# Patient Record
Sex: FEMALE | Race: WHITE | NOT HISPANIC OR LATINO
[De-identification: names, ages, dates, MRNs, and addresses within clinical notes are randomized per-mention and may not be internally consistent; named-entity substitution may affect disease eponyms.]

---

## 2017-11-01 ENCOUNTER — LABORATORY RESULT (OUTPATIENT)
Age: 22
End: 2017-11-01

## 2017-11-01 ENCOUNTER — APPOINTMENT (OUTPATIENT)
Dept: OBGYN | Facility: CLINIC | Age: 22
End: 2017-11-01
Payer: COMMERCIAL

## 2017-11-01 VITALS
HEIGHT: 71 IN | BODY MASS INDEX: 35.42 KG/M2 | DIASTOLIC BLOOD PRESSURE: 78 MMHG | SYSTOLIC BLOOD PRESSURE: 112 MMHG | WEIGHT: 253 LBS

## 2017-11-01 PROCEDURE — 99395 PREV VISIT EST AGE 18-39: CPT

## 2017-11-03 ENCOUNTER — EMERGENCY (EMERGENCY)
Facility: HOSPITAL | Age: 22
LOS: 1 days | Discharge: HOME | End: 2017-11-03
Attending: EMERGENCY MEDICINE | Admitting: EMERGENCY MEDICINE
Payer: COMMERCIAL

## 2017-11-03 VITALS
DIASTOLIC BLOOD PRESSURE: 89 MMHG | TEMPERATURE: 99 F | RESPIRATION RATE: 19 BRPM | SYSTOLIC BLOOD PRESSURE: 142 MMHG | HEART RATE: 66 BPM | OXYGEN SATURATION: 98 %

## 2017-11-03 VITALS
DIASTOLIC BLOOD PRESSURE: 74 MMHG | HEART RATE: 72 BPM | TEMPERATURE: 98 F | OXYGEN SATURATION: 98 % | SYSTOLIC BLOOD PRESSURE: 138 MMHG | RESPIRATION RATE: 18 BRPM

## 2017-11-03 LAB
APPEARANCE UR: CLEAR — SIGNIFICANT CHANGE UP
BACTERIA # UR AUTO: ABNORMAL /HPF
BILIRUB UR-MCNC: NEGATIVE — SIGNIFICANT CHANGE UP
COLOR SPEC: YELLOW — SIGNIFICANT CHANGE UP
DIFF PNL FLD: NEGATIVE — SIGNIFICANT CHANGE UP
EPI CELLS # UR: SIGNIFICANT CHANGE UP /HPF
GLUCOSE UR QL: NEGATIVE — SIGNIFICANT CHANGE UP
KETONES UR-MCNC: NEGATIVE — SIGNIFICANT CHANGE UP
LEUKOCYTE ESTERASE UR-ACNC: ABNORMAL
NITRITE UR-MCNC: NEGATIVE — SIGNIFICANT CHANGE UP
PH UR: 5.5 — SIGNIFICANT CHANGE UP (ref 5–8)
PROT UR-MCNC: NEGATIVE — SIGNIFICANT CHANGE UP
RBC CASTS # UR COMP ASSIST: SIGNIFICANT CHANGE UP /HPF (ref 0–2)
SP GR SPEC: 1.01 — SIGNIFICANT CHANGE UP (ref 1.01–1.02)
UROBILINOGEN FLD QL: NEGATIVE — SIGNIFICANT CHANGE UP
WBC UR QL: SIGNIFICANT CHANGE UP /HPF (ref 0–5)

## 2017-11-03 PROCEDURE — 81001 URINALYSIS AUTO W/SCOPE: CPT

## 2017-11-03 PROCEDURE — 93005 ELECTROCARDIOGRAM TRACING: CPT

## 2017-11-03 PROCEDURE — 93010 ELECTROCARDIOGRAM REPORT: CPT

## 2017-11-03 PROCEDURE — 99283 EMERGENCY DEPT VISIT LOW MDM: CPT | Mod: 25

## 2017-11-03 PROCEDURE — 99284 EMERGENCY DEPT VISIT MOD MDM: CPT | Mod: 25

## 2017-11-03 PROCEDURE — 82962 GLUCOSE BLOOD TEST: CPT

## 2017-11-03 NOTE — ED PROVIDER NOTE - OBJECTIVE STATEMENT
22F no sig pmh p/w dizziness. Patient has had cold like symptoms for a week that are now resolving. 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 Symptoms are reproducible with rapid head movements. Went to nurse and had +orthostatics -->130 and was told to go into the ED. Patient endorses feeling clammy, but otherwise well. No nausea/vomiting, cp, sob, abd pain, diarrhea, palpitations, fevers, chills, no other complaints. States that she has had decreased PO intake recently. Of note, last menstrual period was 1 week ago. 22F no sig pmh p/w dizziness. Patient has had cold like symptoms for a week that are now resolving. 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 Symptoms are reproducible with rapid head movements. Went to nurse and had +orthostatics -->130 and was told to go into the ED. Patient endorses feeling clammy, but otherwise well. No nausea/vomiting, cp, sob, abd pain, diarrhea, palpitations, fevers, chills, no other complaints. States that she has had decreased PO intake recently. Of note, last menstrual period was 1 week ago.

## 2017-11-03 NOTE — ED PROVIDER NOTE - CARE PLAN
Principal Discharge DX:	Lightheadedness  Instructions for follow-up, activity and diet:	Drink plenty of fluids. Rest often. Follow-up with your doctor for a recheck.

## 2017-11-03 NOTE — ED ADULT NURSE NOTE - OBJECTIVE STATEMENT
pt has a "head cold" for 2 weeks.  she feels dizzy at times and was at work and her BP checked and the diastolc was over 100.  she came here for that and for her head cold.  neuro is wdl

## 2017-11-03 NOTE — ED PROVIDER NOTE - ATTENDING CONTRIBUTION TO CARE
Beverley Cottrell MD - Attending Physician: I have personally seen and examined this patient with the resident.  I have fully participated in the care of this patient. I have reviewed all pertinent clinical information, including history, physical exam, plan and the Resident’s note and agree except as noted. See MDM

## 2017-11-03 NOTE — ED PROVIDER NOTE - NS ED ROS FT
CONST: no fevers, no chills  EYES: no pain  ENT: no sore throat   CV: no chest pain  RESP: no shortness of breath  ABD: no abdominal pain   : no dysuria  MSK: no back pain  NEURO: lightheaded, no headache or additional neurologic complaints  HEME: no easy bleeding  SKIN:  no rash

## 2017-11-03 NOTE — ED PROVIDER NOTE - MEDICAL DECISION MAKING DETAILS
22F no sig pmh p/w lightheadedness. Likely 2/2 volume depletion or anemia 2/2 menstrual periods. Will encourage PO intake. Likely d/c home with return precautions 22F no sig pmh p/w lightheadedness. Likely 2/2 volume depletion or anemia 2/2 menstrual periods. Will encourage PO intake. Likely d/c home with return precautions    Beverley Cottrell MD - Attending Physician: Seen with resident. Pt with cold-like symptoms x 1 week, 2 days of intermittent lightheadedness. No syncope. ?Orthostatic at school nurse. Here well appearing, normal exam, able to walk and sit up/bend down without issue. No tachycardia, no pallor. Likely post-viral vs mild dehydration. EKg, Glu, Ua, then dc w/ supportive care

## 2017-11-03 NOTE — ED PROVIDER NOTE - MUSCULOSKELETAL, MLM
5/5 strength in all extremities. Spine appears normal, range of motion is not limited, no muscle or joint tenderness 5/5 strength in all extremities. Spine appears normal, range of motion is not limited, no muscle or joint tenderness. Normal unassisted gait

## 2017-11-06 LAB — CYTOLOGY CVX/VAG DOC THIN PREP: NORMAL

## 2018-01-30 ENCOUNTER — EMERGENCY (EMERGENCY)
Facility: HOSPITAL | Age: 23
LOS: 1 days | Discharge: HOME | End: 2018-01-30
Attending: EMERGENCY MEDICINE | Admitting: EMERGENCY MEDICINE
Payer: COMMERCIAL

## 2018-01-30 VITALS
RESPIRATION RATE: 20 BRPM | TEMPERATURE: 98 F | SYSTOLIC BLOOD PRESSURE: 128 MMHG | HEART RATE: 104 BPM | OXYGEN SATURATION: 96 % | DIASTOLIC BLOOD PRESSURE: 90 MMHG

## 2018-01-30 VITALS
DIASTOLIC BLOOD PRESSURE: 74 MMHG | TEMPERATURE: 98 F | OXYGEN SATURATION: 97 % | HEART RATE: 74 BPM | SYSTOLIC BLOOD PRESSURE: 113 MMHG | RESPIRATION RATE: 15 BRPM

## 2018-01-30 LAB
ALBUMIN SERPL ELPH-MCNC: 4.3 G/DL — SIGNIFICANT CHANGE UP (ref 3.3–5)
ALP SERPL-CCNC: 94 U/L — SIGNIFICANT CHANGE UP (ref 40–120)
ALT FLD-CCNC: 10 U/L RC — SIGNIFICANT CHANGE UP (ref 10–45)
ANION GAP SERPL CALC-SCNC: 13 MMOL/L — SIGNIFICANT CHANGE UP (ref 5–17)
AST SERPL-CCNC: 14 U/L — SIGNIFICANT CHANGE UP (ref 10–40)
BASOPHILS # BLD AUTO: 0 K/UL — SIGNIFICANT CHANGE UP (ref 0–0.2)
BASOPHILS NFR BLD AUTO: 0.2 % — SIGNIFICANT CHANGE UP (ref 0–2)
BILIRUB SERPL-MCNC: 0.3 MG/DL — SIGNIFICANT CHANGE UP (ref 0.2–1.2)
BUN SERPL-MCNC: 10 MG/DL — SIGNIFICANT CHANGE UP (ref 7–23)
CALCIUM SERPL-MCNC: 9.7 MG/DL — SIGNIFICANT CHANGE UP (ref 8.4–10.5)
CHLORIDE SERPL-SCNC: 100 MMOL/L — SIGNIFICANT CHANGE UP (ref 96–108)
CO2 SERPL-SCNC: 26 MMOL/L — SIGNIFICANT CHANGE UP (ref 22–31)
CREAT SERPL-MCNC: 0.74 MG/DL — SIGNIFICANT CHANGE UP (ref 0.5–1.3)
EOSINOPHIL # BLD AUTO: 0.1 K/UL — SIGNIFICANT CHANGE UP (ref 0–0.5)
EOSINOPHIL NFR BLD AUTO: 0.6 % — SIGNIFICANT CHANGE UP (ref 0–6)
GAS PNL BLDV: SIGNIFICANT CHANGE UP
GLUCOSE SERPL-MCNC: 100 MG/DL — HIGH (ref 70–99)
HCG SERPL-ACNC: <2 MIU/ML — LOW (ref 5–24)
HCT VFR BLD CALC: 41.1 % — SIGNIFICANT CHANGE UP (ref 34.5–45)
HGB BLD-MCNC: 14.3 G/DL — SIGNIFICANT CHANGE UP (ref 11.5–15.5)
LYMPHOCYTES # BLD AUTO: 1.8 K/UL — SIGNIFICANT CHANGE UP (ref 1–3.3)
LYMPHOCYTES # BLD AUTO: 16.8 % — SIGNIFICANT CHANGE UP (ref 13–44)
MCHC RBC-ENTMCNC: 33.6 PG — SIGNIFICANT CHANGE UP (ref 27–34)
MCHC RBC-ENTMCNC: 34.8 GM/DL — SIGNIFICANT CHANGE UP (ref 32–36)
MCV RBC AUTO: 96.8 FL — SIGNIFICANT CHANGE UP (ref 80–100)
MONOCYTES # BLD AUTO: 0.6 K/UL — SIGNIFICANT CHANGE UP (ref 0–0.9)
MONOCYTES NFR BLD AUTO: 5.3 % — SIGNIFICANT CHANGE UP (ref 2–14)
NEUTROPHILS # BLD AUTO: 8.3 K/UL — HIGH (ref 1.8–7.4)
NEUTROPHILS NFR BLD AUTO: 77.1 % — HIGH (ref 43–77)
PLATELET # BLD AUTO: 291 K/UL — SIGNIFICANT CHANGE UP (ref 150–400)
POTASSIUM SERPL-MCNC: 3.9 MMOL/L — SIGNIFICANT CHANGE UP (ref 3.5–5.3)
POTASSIUM SERPL-SCNC: 3.9 MMOL/L — SIGNIFICANT CHANGE UP (ref 3.5–5.3)
PROT SERPL-MCNC: 8 G/DL — SIGNIFICANT CHANGE UP (ref 6–8.3)
RBC # BLD: 4.25 M/UL — SIGNIFICANT CHANGE UP (ref 3.8–5.2)
RBC # FLD: 11.4 % — SIGNIFICANT CHANGE UP (ref 10.3–14.5)
SODIUM SERPL-SCNC: 139 MMOL/L — SIGNIFICANT CHANGE UP (ref 135–145)
WBC # BLD: 10.8 K/UL — HIGH (ref 3.8–10.5)
WBC # FLD AUTO: 10.8 K/UL — HIGH (ref 3.8–10.5)

## 2018-01-30 PROCEDURE — 80053 COMPREHEN METABOLIC PANEL: CPT

## 2018-01-30 PROCEDURE — 96374 THER/PROPH/DIAG INJ IV PUSH: CPT

## 2018-01-30 PROCEDURE — 84702 CHORIONIC GONADOTROPIN TEST: CPT

## 2018-01-30 PROCEDURE — 85027 COMPLETE CBC AUTOMATED: CPT

## 2018-01-30 PROCEDURE — 99284 EMERGENCY DEPT VISIT MOD MDM: CPT

## 2018-01-30 PROCEDURE — 82803 BLOOD GASES ANY COMBINATION: CPT

## 2018-01-30 PROCEDURE — 84295 ASSAY OF SERUM SODIUM: CPT

## 2018-01-30 PROCEDURE — 85014 HEMATOCRIT: CPT

## 2018-01-30 PROCEDURE — 84132 ASSAY OF SERUM POTASSIUM: CPT

## 2018-01-30 PROCEDURE — 82947 ASSAY GLUCOSE BLOOD QUANT: CPT

## 2018-01-30 PROCEDURE — 83605 ASSAY OF LACTIC ACID: CPT

## 2018-01-30 PROCEDURE — 82435 ASSAY OF BLOOD CHLORIDE: CPT

## 2018-01-30 PROCEDURE — 99284 EMERGENCY DEPT VISIT MOD MDM: CPT | Mod: 25

## 2018-01-30 PROCEDURE — 82330 ASSAY OF CALCIUM: CPT

## 2018-01-30 RX ORDER — DEXAMETHASONE 0.5 MG/5ML
10 ELIXIR ORAL ONCE
Qty: 0 | Refills: 0 | Status: COMPLETED | OUTPATIENT
Start: 2018-01-30 | End: 2018-01-30

## 2018-01-30 RX ORDER — KETOROLAC TROMETHAMINE 30 MG/ML
15 SYRINGE (ML) INJECTION ONCE
Qty: 0 | Refills: 0 | Status: DISCONTINUED | OUTPATIENT
Start: 2018-01-30 | End: 2018-01-30

## 2018-01-30 RX ORDER — ACETAMINOPHEN 500 MG
975 TABLET ORAL ONCE
Qty: 0 | Refills: 0 | Status: COMPLETED | OUTPATIENT
Start: 2018-01-30 | End: 2018-01-30

## 2018-01-30 RX ORDER — ACETAMINOPHEN 500 MG
975 TABLET ORAL ONCE
Qty: 0 | Refills: 0 | Status: DISCONTINUED | OUTPATIENT
Start: 2018-01-30 | End: 2018-01-30

## 2018-01-30 RX ADMIN — Medication 15 MILLIGRAM(S): at 10:07

## 2018-01-30 RX ADMIN — Medication 10 MILLIGRAM(S): at 11:06

## 2018-01-30 RX ADMIN — Medication 975 MILLIGRAM(S): at 10:07

## 2018-01-30 NOTE — ED ADULT NURSE NOTE - OBJECTIVE STATEMENT
21 yo female presents to ED with c/o back pain x3 months that is worsening. Patient is A&O x3. She reports feeling sharp lower back pain that is 9/10 and has not been resolved with steroids, muscle relaxers, and physical therapy. Patient reports having recent MRI that revealed two bulging disks. Patient reports that pain has worsened and spread to joints, upper body, knees and hips. She states that there is a new tingling sensation in the feet that began yesterday. She reports frequent fatigue and weakness. She denies any recent illnesses. She also reports being seen by an outside rheumatologist and ruled out for autoimmune causes. She denies injury. At this time there are no complaints of chest pain, sob, loss of motor function, loss of bowel and bladder control, all extremities strong bilaterally, no headaches, no N/V/D. She denies urinary complaints. Skin is warm and dry. VSS/NAD. Safety and comfort maintained. Will continue to monitor. Mother at bedside.

## 2018-01-30 NOTE — ED PROVIDER NOTE - CARE PLAN
Principal Discharge DX:	Chronic bilateral low back pain without sciatica Principal Discharge DX:	Chronic bilateral low back pain with bilateral sciatica

## 2018-01-30 NOTE — ED ADULT NURSE NOTE - CHPI ED SYMPTOMS NEG
no difficulty bearing weight/no motor function loss/no numbness/no bowel dysfunction/no anorexia/no neck tenderness/no bladder dysfunction/no constipation/no injury, no illness

## 2018-01-30 NOTE — ED PROVIDER NOTE - ATTENDING CONTRIBUTION TO CARE
ATTENDING MD:  Valentin PISANO, personally have seen and examined this patient.  I have discussed all aspects of care with the resident physician. Resident note reviewed and agree on plan of care and except where noted.  See HPI, PE, and MDM for details.    VITALS: reviewed  GEN: NAD, A & O x 4  HEAD/EYES: NCAT, PERRL, EOMI, anicteric sclerae, no conjunctival pallor  ENT: mucus membranes moist, oropharynx WNL, trachea midline, no JVD  RESP: lungs CTA with equal breath sounds bilaterally, chest wall nontender and atraumatic  CV: heart with reg rhythm S1, S2, no murmur; distal pulses intact and symmetric bilaterally  ABDOMEN: normoactive bowel sounds, soft, nondistended, nontender, no palpable masses  : no CVAT  MSK: extremities atraumatic and nontender, no edema, no assymetry. the back is without midline, there is no spinal deformity. there is mild bilateral lumbosacral muscular tenderness that partially reproduces nonreferred pain, no piriformis fossa tenderness. the neck has no midline tenderness, deformity, or stepoff, and is ranged painlessly. straight leg raise negative bilaterally per my exam, though reported positive prior to initial pain control per patient. per history cross leg raise was negative.  SKIN: warm, dry, no rash, no bruising, no cyanosis. color appropriate for ethnicity  NEURO: alert, mentating appropriately, 5/5 strength throughout UEs and LEs symmetric bilaterally, sensation intact to light touch throughout trunk and extremities, 2+ patellar and heel jerk reflexes, downgoing toes, rectal exam per resident note  PSYCH: Affect appropriate    MDM: acute on chronic pain with no worsening quality or distribution of symptoms. some red flag from bilateral straight leg raise though this is explained by known disc disease. no neurologic symptoms beyond chronic foot tingling. reassuring exam. no indication for imaging. do not think indication for labs (though were ordered prior to my assessment). pain control, reassess. I spent approximately 15-20 minutes counselling the patient and the mother about expected goals of treatment, a normal course of sciatic back pain, available therapies, reasons not to use opiate therapy, reasons to come to the ED vs. outpatient evaluation, appropriate home care, an appropriate course for follow up and concerning signs and symptoms for which to return to the ED. they appeared to have good insight and were receptive to this instruction.

## 2018-01-30 NOTE — ED PROVIDER NOTE - RECTAL
good rectal tone, performed by ms4 lanny, chaperone by dr delarosa/non-tender good rectal tone and perianal sensation, performed by ms4 lanny, chaperone by dr delarosa and nurse/non-tender

## 2018-01-30 NOTE — ED PROVIDER NOTE - PRINCIPAL DIAGNOSIS
Chronic bilateral low back pain without sciatica Chronic bilateral low back pain with bilateral sciatica

## 2018-01-30 NOTE — ED PROVIDER NOTE - PHYSICAL EXAMINATION
Gen: appears stated age, in pain lying on stretcher   CV: RRR, no murmurs  Resp: CTA b/l, no wheezes  GI: non tender, non distended  Neuro: CN II - XII intact, strength intact, sensation intact throughout, Gait normal, Coordination normal, reflexes normal  Rectal Exam: tone intact, sensation intact  MSK: tender to palpation lower back, b/l knees, b/l quads, pain with straight leg raises bilaterally right > left, normal range of motion,

## 2018-01-30 NOTE — ED PROVIDER NOTE - OBJECTIVE STATEMENT
23yo F pmh bulging disc p/w back pain. back pain is around L3/L4 region. radiates to b/l le, constant  takes muscle relaxant w/o relief. 23yo F pmh bulging disc p/w back pain. Her back pain has been present since November and has been constant. The pain originates in the L3/L4 region and radiates to b/l le and knees.  She describes the pain as sharp and states that it is an 8/10 in severity. She states it is worse in the cold and at night, but it never goes away. Using heat therapy minimally helps the pain. In November she had an MRI of her back which showed bulging discs in the T11-T12 and L4-L5 regions as well as arthritis in the facets. She has been prescribed tizanidine, maloxicam, and an oral steroid for her pain, but has not had any relief. She has also tried physical therapy and chiropractor without relief. She has had some tingling in her feet, but does not complain of pins and needles. She has had some fatigue, but no focal muscle weakness. She denies any urinary retention or incontinence and denies any fecal incontinence. She denies any chest pain, shortness of breath, headache, nausea, or constipation. She has been tested by a rheumatologist for HLA 27, and the tests were negative.    pcp: Dr Ashwin Robert 23yo F pmh bulging disc p/w back pain. Her back pain has been present since November and has been constant. The pain originates in the L3/L4 region and radiates to b/l le and knees.  She describes the pain as sharp and states that it is an 8/10 in severity. She states it is worse in the cold and at night, but it never goes away. Using heat therapy minimally helps the pain. In November she had an MRI of her back which showed bulging discs in the T11-T12 and L4-L5 regions as well as arthritis in the facets. She has been prescribed tizanidine, meloxicam, and an oral steroid for her pain, but has not had any relief. She has also tried physical therapy and chiropractor without relief. She has had some tingling in her feet for months, but does not complain of pins and needles. She has had some fatigue, but no focal muscle weakness. She denies any urinary retention or incontinence and denies any fecal incontinence. She denies any chest pain, shortness of breath, headache, nausea, or constipation. She has been tested by a rheumatologist for HLA 27, and the tests were negative. All symptoms today have been present for at least 4 weeks, distribution of referred pain constant and unchanging, though today's pain is slightly worse than usual.    pcp: Dr Ashwin Robert

## 2018-01-30 NOTE — ED PROVIDER NOTE - PROGRESS NOTE DETAILS
[place]: PT seen and reassessed.  Patient symptomatically improved.   AAOX3, NAD, VSS.  Discussed test results w/ patient. Patient verbalized understanding of hospital course and outpatient plans, has decisional making capacity.  Will f/u w/ pmd in the next few days; patient will call for an appointment. Will return to the ED if there is any worsening of symptoms.  Patient able to ambulate at baseline, is tolerating PO intake [place]: PT seen and reassessed.  Patient symptomatically improved.   AAOX3, NAD, VSS.  Discussed test results w/ patient. Patient verbalized understanding of hospital course and outpatient plans, has decisional making capacity.  Will f/u w/ pmd in the next few days; patient will call for an appointment. Will return to the ED if there is any worsening of symptoms.  Patient able to ambulate at baseline, is tolerating PO intake    ATTENDING MD Karlene: agree with above. plans to f/u with her established outpatient spine specialist. offered referral to [facility] for 2nd evaluation if pt and mother which second opinion. stable for DC.

## 2018-01-30 NOTE — ED PROVIDER NOTE - NS ED ROS FT
Review of Systems:  	•	CONSTITUTIONAL - no fever,   	•	SKIN - no rash  	•	HEMATOLOGIC - , no bruising  	  	•	RESPIRATORY - no shortness of breath, no cough  	•	CARDIAC - no chest pain, no palpitations  	•	GI - no abd pain, no nausea, no vomiting, no diarrhea,  no bleeding  	•	GENITO-URINARY - no vaginal bleeding,   	•	ENDO - no polydypsia, no polyurea, no heat/no cold intolerance  	•	MUSCULOSKELETAL - back pain, le pain  	•	NEUROLOGIC - no weakness, no headache, no anesthesia, no paresthesias  	•	ALLERGY - no rhinitis Review of Systems:  	•	CONSTITUTIONAL - no fever,   	•	SKIN - no rash  	•	HEMATOLOGIC - , no bruising  	  	•	RESPIRATORY - no shortness of breath, no cough  	•	CARDIAC - no chest pain, no palpitations  	•	GI - no abd pain, no nausea, no vomiting, no diarrhea,  no bleeding  	•	GENITO-URINARY - no vaginal bleeding,   	•	ENDO - no polydypsia, no polyurea, no heat/no cold intolerance  	•	MUSCULOSKELETAL - see HPI  	•	NEUROLOGIC - no weakness, no numbness, no headache, no anesthesia, no paresthesias  	•	ALLERGY - no rhinitis

## 2018-03-15 ENCOUNTER — RX RENEWAL (OUTPATIENT)
Age: 23
End: 2018-03-15

## 2018-11-07 ENCOUNTER — LABORATORY RESULT (OUTPATIENT)
Age: 23
End: 2018-11-07

## 2018-11-07 ENCOUNTER — APPOINTMENT (OUTPATIENT)
Dept: OBGYN | Facility: CLINIC | Age: 23
End: 2018-11-07
Payer: COMMERCIAL

## 2018-11-07 VITALS
HEIGHT: 71 IN | SYSTOLIC BLOOD PRESSURE: 107 MMHG | DIASTOLIC BLOOD PRESSURE: 75 MMHG | BODY MASS INDEX: 35.7 KG/M2 | WEIGHT: 255 LBS

## 2018-11-07 PROCEDURE — 99395 PREV VISIT EST AGE 18-39: CPT

## 2018-11-14 LAB — CYTOLOGY CVX/VAG DOC THIN PREP: NORMAL

## 2019-02-21 ENCOUNTER — TRANSCRIPTION ENCOUNTER (OUTPATIENT)
Age: 24
End: 2019-02-21

## 2019-02-21 ENCOUNTER — MEDICATION RENEWAL (OUTPATIENT)
Age: 24
End: 2019-02-21

## 2019-05-15 ENCOUNTER — TRANSCRIPTION ENCOUNTER (OUTPATIENT)
Age: 24
End: 2019-05-15

## 2019-07-11 ENCOUNTER — TRANSCRIPTION ENCOUNTER (OUTPATIENT)
Age: 24
End: 2019-07-11

## 2019-10-23 ENCOUNTER — RX RENEWAL (OUTPATIENT)
Age: 24
End: 2019-10-23

## 2019-11-08 ENCOUNTER — TRANSCRIPTION ENCOUNTER (OUTPATIENT)
Age: 24
End: 2019-11-08

## 2019-11-27 ENCOUNTER — APPOINTMENT (OUTPATIENT)
Dept: SURGERY | Facility: CLINIC | Age: 24
End: 2019-11-27
Payer: COMMERCIAL

## 2019-11-27 ENCOUNTER — LABORATORY RESULT (OUTPATIENT)
Age: 24
End: 2019-11-27

## 2019-11-27 PROCEDURE — 10021 FNA BX W/O IMG GDN 1ST LES: CPT

## 2019-11-27 PROCEDURE — 99203 OFFICE O/P NEW LOW 30 MIN: CPT

## 2019-11-29 NOTE — REASON FOR VISIT
[Initial Consultation] : an initial consultation for [FreeTextEntry2] : cervical adenopathy [Procedure: _________] : a [unfilled] procedure visit [Other: _____] : [unfilled]

## 2019-11-29 NOTE — PROCEDURE
[FreeTextEntry1] : FNA cervical LN [FreeTextEntry2] : 1 mo hx of left cervical adenoathy [FreeTextEntry3] : Patient for LN biopsy. Risks benefits and alternatives discussed including bleeding, infection, swelling and need for repeat biopsy. Questions answered.\par Consent obtained, timeout taken.\par \par Patient supine.\par No anesthetic used. \par left 2 cm round LN localized by palpation\par Sterile technique with Betadine skin prep.\par 22-gauge needle under ultrasound guidance with a single pass.\par Slides prepared sent to histology.\par \par Post procedure:\par Hemostasis obtained, patient tolerated well, no complications.\par Post procedure instructions given.\par

## 2019-11-29 NOTE — REVIEW OF SYSTEMS
[As Noted in HPI] : as noted in HPI [Swollen Glands In The Neck] : swollen glands in the neck [Easy Bleeding] : a tendency for easy bleeding [Negative] : Endocrine

## 2019-11-29 NOTE — PHYSICAL EXAM
[de-identified] : left  cervical and supraclavicular adenopathy, trachea midline, thyroid without enlargement or palpable mass [Normal] : assessment of respiratory effort is normal [de-identified] : Skin:  normal appearance.  no rash, nodules, vesicles, or erythema,\par Musculoskeletal:  full range of motion and no deformities appreciated\par Neurological:  grossly intact\par Psychiatric:  oriented to person, place and time with appropriate affect

## 2019-11-29 NOTE — PHYSICAL EXAM
[de-identified] : left  cervical and supraclavicular adenopathy, trachea midline, thyroid without enlargement or palpable mass [Normal] : assessment of respiratory effort is normal [de-identified] : Skin:  normal appearance.  no rash, nodules, vesicles, or erythema,\par Musculoskeletal:  full range of motion and no deformities appreciated\par Neurological:  grossly intact\par Psychiatric:  oriented to person, place and time with appropriate affect

## 2019-11-29 NOTE — CONSULT LETTER
[Dear  ___] : Dear  [unfilled], [Consult Letter:] : I had the pleasure of evaluating your patient, [unfilled]. [Please see my note below.] : Please see my note below. [Consult Closing:] : Thank you very much for allowing me to participate in the care of this patient.  If you have any questions, please do not hesitate to contact me. [FreeTextEntry2] : Dr. Sai Robert [FreeTextEntry3] : Sincerely yours,\par \par Amee Carreon MD, FACS\par Assistant Professor of Surgery\par [facility]

## 2019-11-29 NOTE — HISTORY OF PRESENT ILLNESS
[de-identified] : Patient referred by Dr. Robert for evaluation of left supraclavicular adenopathy. One month ago patient noted left neck swelling complaining of fatigue and whole body itching, with chills and increased bruising. Patient has not had imaging performed, denies change in weight, history of skin cancer her recent illness.

## 2019-11-29 NOTE — REASON FOR VISIT
[FreeTextEntry2] : cervical adenopathy [Initial Consultation] : an initial consultation for [Procedure: _________] : a [unfilled] procedure visit [Other: _____] : [unfilled]

## 2019-11-29 NOTE — PROCEDURE
[FreeTextEntry1] : FNA cervical LN [FreeTextEntry3] : Patient for LN biopsy. Risks benefits and alternatives discussed including bleeding, infection, swelling and need for repeat biopsy. Questions answered.\par Consent obtained, timeout taken.\par \par Patient supine.\par No anesthetic used. \par left 2 cm round LN localized by palpation\par Sterile technique with Betadine skin prep.\par 22-gauge needle under ultrasound guidance with a single pass.\par Slides prepared sent to histology.\par \par Post procedure:\par Hemostasis obtained, patient tolerated well, no complications.\par Post procedure instructions given.\par  [FreeTextEntry2] : 1 mo hx of left cervical adenoathy

## 2019-11-29 NOTE — HISTORY OF PRESENT ILLNESS
[de-identified] : Patient referred by Dr. Robert for evaluation of left supraclavicular adenopathy. One month ago patient noted left neck swelling complaining of fatigue and whole body itching, with chills and increased bruising. Patient has not had imaging performed, denies change in weight, history of skin cancer her recent illness.

## 2019-11-29 NOTE — ASSESSMENT
[FreeTextEntry1] : Patient with suspicious left cervical adenopathy. Fine-needle aspiration performed. Please see separate procedure note. The patient will contact my office to review results. Based on results, the need for intervention will be determined.

## 2019-12-04 ENCOUNTER — LABORATORY RESULT (OUTPATIENT)
Age: 24
End: 2019-12-04

## 2019-12-04 ENCOUNTER — APPOINTMENT (OUTPATIENT)
Dept: OBGYN | Facility: CLINIC | Age: 24
End: 2019-12-04
Payer: COMMERCIAL

## 2019-12-04 VITALS
WEIGHT: 231 LBS | HEIGHT: 71 IN | DIASTOLIC BLOOD PRESSURE: 81 MMHG | SYSTOLIC BLOOD PRESSURE: 118 MMHG | BODY MASS INDEX: 32.34 KG/M2

## 2019-12-04 PROCEDURE — 99395 PREV VISIT EST AGE 18-39: CPT

## 2019-12-04 NOTE — PHYSICAL EXAM
[Alert] : alert [Awake] : awake [Acute Distress] : no acute distress [Mass] : no breast mass [Axillary LAD] : no axillary lymphadenopathy [Soft] : soft [Nipple Discharge] : no nipple discharge [Oriented x3] : oriented to person, place, and time [Tender] : non tender [No Bleeding] : there was no active vaginal bleeding [Uterine Adnexae] : were not tender and not enlarged [Normal] : uterus

## 2019-12-09 ENCOUNTER — OUTPATIENT (OUTPATIENT)
Dept: OUTPATIENT SERVICES | Facility: HOSPITAL | Age: 24
LOS: 1 days | End: 2019-12-09

## 2019-12-09 VITALS
RESPIRATION RATE: 16 BRPM | OXYGEN SATURATION: 98 % | SYSTOLIC BLOOD PRESSURE: 126 MMHG | HEIGHT: 72 IN | HEART RATE: 86 BPM | TEMPERATURE: 99 F | WEIGHT: 220.02 LBS | DIASTOLIC BLOOD PRESSURE: 80 MMHG

## 2019-12-09 DIAGNOSIS — R59.0 LOCALIZED ENLARGED LYMPH NODES: ICD-10-CM

## 2019-12-09 LAB
ANION GAP SERPL CALC-SCNC: 13 MMO/L — SIGNIFICANT CHANGE UP (ref 7–14)
BUN SERPL-MCNC: 11 MG/DL — SIGNIFICANT CHANGE UP (ref 7–23)
CALCIUM SERPL-MCNC: 10.8 MG/DL — HIGH (ref 8.4–10.5)
CHLORIDE SERPL-SCNC: 96 MMOL/L — LOW (ref 98–107)
CO2 SERPL-SCNC: 28 MMOL/L — SIGNIFICANT CHANGE UP (ref 22–31)
CREAT SERPL-MCNC: 0.69 MG/DL — SIGNIFICANT CHANGE UP (ref 0.5–1.3)
CYTOLOGY CVX/VAG DOC THIN PREP: ABNORMAL
GLUCOSE SERPL-MCNC: 76 MG/DL — SIGNIFICANT CHANGE UP (ref 70–99)
HCT VFR BLD CALC: 45.1 % — HIGH (ref 34.5–45)
HGB BLD-MCNC: 14.6 G/DL — SIGNIFICANT CHANGE UP (ref 11.5–15.5)
MCHC RBC-ENTMCNC: 30.8 PG — SIGNIFICANT CHANGE UP (ref 27–34)
MCHC RBC-ENTMCNC: 32.4 % — SIGNIFICANT CHANGE UP (ref 32–36)
MCV RBC AUTO: 95.1 FL — SIGNIFICANT CHANGE UP (ref 80–100)
NRBC # FLD: 0 K/UL — SIGNIFICANT CHANGE UP (ref 0–0)
PLATELET # BLD AUTO: 392 K/UL — SIGNIFICANT CHANGE UP (ref 150–400)
PMV BLD: 9.9 FL — SIGNIFICANT CHANGE UP (ref 7–13)
POTASSIUM SERPL-MCNC: 3.9 MMOL/L — SIGNIFICANT CHANGE UP (ref 3.5–5.3)
POTASSIUM SERPL-SCNC: 3.9 MMOL/L — SIGNIFICANT CHANGE UP (ref 3.5–5.3)
RBC # BLD: 4.74 M/UL — SIGNIFICANT CHANGE UP (ref 3.8–5.2)
RBC # FLD: 12.4 % — SIGNIFICANT CHANGE UP (ref 10.3–14.5)
SODIUM SERPL-SCNC: 137 MMOL/L — SIGNIFICANT CHANGE UP (ref 135–145)
WBC # BLD: 9.76 K/UL — SIGNIFICANT CHANGE UP (ref 3.8–10.5)
WBC # FLD AUTO: 9.76 K/UL — SIGNIFICANT CHANGE UP (ref 3.8–10.5)

## 2019-12-09 NOTE — H&P PST ADULT - ENMT COMMENTS
Suggest to restart your stroke exercises    
"pressure on left side of neck due to enlarged lymph node"

## 2019-12-09 NOTE — H&P PST ADULT - NEGATIVE CARDIOVASCULAR SYMPTOMS
no claudication/no palpitations/no orthopnea/no paroxysmal nocturnal dyspnea/no peripheral edema/no dyspnea on exertion/no chest pain

## 2019-12-09 NOTE — H&P PST ADULT - NSANTHOSAYNRD_GEN_A_CORE
No. GLYNN screening performed.  STOP BANG Legend: 0-2 = LOW Risk; 3-4 = INTERMEDIATE Risk; 5-8 = HIGH Risk

## 2019-12-09 NOTE — H&P PST ADULT - NSICDXFAMILYHX_GEN_ALL_CORE_FT
FAMILY HISTORY:  Family history of Hodgkin's lymphoma, cousin  FH: aneurysm, grandmother  FH: heart disease, valve disease-mother, grandfather-MI    Mother  Still living? Unknown  Family history of osteoarthritis, Age at diagnosis: Age Unknown

## 2019-12-09 NOTE — H&P PST ADULT - NEGATIVE NEUROLOGICAL SYMPTOMS
no difficulty walking/no paresthesias/no vertigo/no headache/no loss of sensation/no generalized seizures/no weakness/no transient paralysis

## 2019-12-09 NOTE — H&P PST ADULT - MUSCULOSKELETAL
details… detailed exam no joint warmth/no calf tenderness/normal strength/ROM intact/no joint erythema/no joint swelling

## 2019-12-09 NOTE — H&P PST ADULT - HISTORY OF PRESENT ILLNESS
25 yo female with preop dx. localized enlarged lymph nodes presents to pre surgical testing.  Pt reports enlarged left cervical lymph node 1.5 months ago, associated with generalized pruritis and chills.  Pt was seen by PCP who referred her to ENT.  Pt was seen by Dr. Carreon, FNA done, revealing suspicious for Hodgkin's lymphoma.  Pt pending PET/CT.  Pt is scheduled for excision left cervical lymph node on 12/13/19.

## 2019-12-09 NOTE — H&P PST ADULT - NEGATIVE ENMT SYMPTOMS
no nasal congestion/no tinnitus/no vertigo/no ear pain/no hearing difficulty/no sinus symptoms/no dysphagia

## 2019-12-09 NOTE — H&P PST ADULT - NSICDXPASTMEDICALHX_GEN_ALL_CORE_FT
PAST MEDICAL HISTORY:  Localized enlarged lymph nodes     No pertinent past medical history PAST MEDICAL HISTORY:  Localized enlarged lymph nodes

## 2019-12-09 NOTE — H&P PST ADULT - NSICDXPROBLEM_GEN_ALL_CORE_FT
PROBLEM DIAGNOSES  Problem: Localized enlarged lymph nodes  Assessment and Plan: Pt is scheduled for excision left cervical lymph node on 12/13/19.   Verbal and written pre op instructions reviewed with patient and pt able to verbalize understanding.   Verbal and written instructions given with chlorhexidine wash, pt able to verbalize understanding via teach back method.   Sterile cup given, pt instructed to bring urine sample AM of surgery for UCG and pt able to verbalize understanding.   Surgeon notified of pt's rash via email.

## 2019-12-10 ENCOUNTER — OUTPATIENT (OUTPATIENT)
Dept: OUTPATIENT SERVICES | Facility: HOSPITAL | Age: 24
LOS: 1 days | Discharge: HOME | End: 2019-12-10

## 2019-12-10 DIAGNOSIS — C81.70 OTHER HODGKIN LYMPHOMA, UNSPECIFIED SITE: ICD-10-CM

## 2019-12-11 PROBLEM — R59.0 LOCALIZED ENLARGED LYMPH NODES: Chronic | Status: ACTIVE | Noted: 2019-12-09

## 2019-12-13 ENCOUNTER — APPOINTMENT (OUTPATIENT)
Dept: SURGERY | Facility: HOSPITAL | Age: 24
End: 2019-12-13

## 2019-12-13 ENCOUNTER — RESULT REVIEW (OUTPATIENT)
Age: 24
End: 2019-12-13

## 2019-12-13 ENCOUNTER — OUTPATIENT (OUTPATIENT)
Dept: OUTPATIENT SERVICES | Facility: HOSPITAL | Age: 24
LOS: 1 days | Discharge: HOME | End: 2019-12-13
Payer: COMMERCIAL

## 2019-12-13 VITALS
HEART RATE: 80 BPM | TEMPERATURE: 99 F | OXYGEN SATURATION: 100 % | DIASTOLIC BLOOD PRESSURE: 86 MMHG | RESPIRATION RATE: 16 BRPM | SYSTOLIC BLOOD PRESSURE: 136 MMHG | WEIGHT: 229.94 LBS

## 2019-12-13 VITALS
OXYGEN SATURATION: 98 % | RESPIRATION RATE: 15 BRPM | HEART RATE: 86 BPM | DIASTOLIC BLOOD PRESSURE: 64 MMHG | TEMPERATURE: 99 F | SYSTOLIC BLOOD PRESSURE: 126 MMHG

## 2019-12-13 DIAGNOSIS — R59.0 LOCALIZED ENLARGED LYMPH NODES: ICD-10-CM

## 2019-12-13 LAB — HCG UR QL: NEGATIVE — SIGNIFICANT CHANGE UP

## 2019-12-13 PROCEDURE — 88188 FLOWCYTOMETRY/READ 9-15: CPT

## 2019-12-13 PROCEDURE — 88341 IMHCHEM/IMCYTCHM EA ADD ANTB: CPT | Mod: 26

## 2019-12-13 PROCEDURE — 88364 INSITU HYBRIDIZATION (FISH): CPT | Mod: 26

## 2019-12-13 PROCEDURE — 88342 IMHCHEM/IMCYTCHM 1ST ANTB: CPT | Mod: 26,59

## 2019-12-13 PROCEDURE — 88307 TISSUE EXAM BY PATHOLOGIST: CPT | Mod: 26

## 2019-12-13 PROCEDURE — 88365 INSITU HYBRIDIZATION (FISH): CPT | Mod: 26,59

## 2019-12-13 PROCEDURE — 38510 BIOPSY/REMOVAL LYMPH NODES: CPT

## 2019-12-13 PROCEDURE — 88367 INSITU HYBRIDIZATION AUTO: CPT | Mod: 26

## 2019-12-13 RX ORDER — SODIUM CHLORIDE 9 MG/ML
1000 INJECTION, SOLUTION INTRAVENOUS
Refills: 0 | Status: DISCONTINUED | OUTPATIENT
Start: 2019-12-13 | End: 2019-12-30

## 2019-12-13 RX ORDER — ACETAMINOPHEN 500 MG
2 TABLET ORAL
Qty: 0 | Refills: 0 | DISCHARGE
Start: 2019-12-13

## 2019-12-13 RX ORDER — IBUPROFEN 200 MG
1 TABLET ORAL
Qty: 0 | Refills: 0 | DISCHARGE

## 2019-12-13 RX ORDER — ACETAMINOPHEN 500 MG
650 TABLET ORAL EVERY 6 HOURS
Refills: 0 | Status: DISCONTINUED | OUTPATIENT
Start: 2019-12-13 | End: 2019-12-30

## 2019-12-13 NOTE — ASU DISCHARGE PLAN (ADULT/PEDIATRIC) - CARE PROVIDER_API CALL
Amee Carreon)  Surgery  [address], [address]  [place] [address]  Phone: [phone]  Fax: [phone]  Scheduled Appointment: 12/18/2019

## 2019-12-13 NOTE — ASU DISCHARGE PLAN (ADULT/PEDIATRIC) - NURSING INSTRUCTIONS
You received IV Tylenol for pain management at __1:15pm _. Please DO NOT take any Tylenol (Acetaminophen) containing products, such as Vicodin, Percocet, Excedrin, and cold medications for the next 6 hours (until __7:15 _ PM). DO NOT TAKE MORE THAN 3000 MG OF TYLENOL in a 24 hour period.

## 2019-12-13 NOTE — ASU PATIENT PROFILE, ADULT - NS TRANSFER PROSTHESIS:
Patient:   JERRY SALAS            MRN: [id number]            FIN: [id number]              Age:   41 years     Sex:  MALE     :  74   Associated Diagnoses:   None   Author:   JAJA JOSE     Chief Complaint:  _Passed out on floor    History of present illness: Per chart, ER doctor, patient is uncooperative and does not want to answer questions  _41-year-old with a history of alcohol abuse, DTs, seizures, pancreatitis, hypertension, C. difficile, nonischemic cardiomyopathy although last echo in 2018 showed ejection fraction of 55% normal systolic function, thrombocytopenia, bipolar was brought in by EMS.  His sister called them because he was not responding.  He was found laying on the floor with dried blood coming from the nose and abrasions to his knees.  He does not remember  passing out or any injury.  He will only say he was drinking heavily alcohol for the past 2 days.  He complains of some chest pain for the past 2 days some nausea no abdominal pain no diarrhea.  First he says he does not have a cough that he says he has a cough.  He still smokes at least a pack a day.  He denies other drugs.  He lives alone    His white count was 20.9, hemoglobin 11.2, platelets 99, glucose was 264, sodium 129, potassium 2.8, creatinine 0.56, AST 54, alcohol liver 407, , pro calcitonin less than 0.05, INR 0.9, lactate 4.2, EKG sinus tachycardia at 117 T wave inversions inferiorly.  New from previously.  Chest x-ray left lower lobe opacity atelectasis versus pneumonia.  CT brain and maxillary bones negative he was given fluids and Zosyn.    Review of Systems:  Constitutional: No fatigue  Skin: No rash  Eyes: No  eye pain    ENT: No ear pain  Endocrine: No excessive thirst    Cardiovascular: No blue hands  Respiratory: No hemoptysis   Gastrointestinal: No zoey colored stools  Genitourinary: No blood in urine  Musculoskeletal: No nodules joints   Neurologic: No double vision  Hematologic: No  unusual bruising or bleeding  Psychiatric: No hallucinations    Past medical history:   Addiction  Alcohol intoxication  Alcohol withdrawal syndrome  Altered mental status  Bipolar  Broken ribs  Cardiomyopathy, nonischemic  Chronic left-sided congestive heart failure  Congestive heart failure due to left ventricular systolic dysfunction  DTs (delirium tremens)  Depression  ETOH abuse  ETOH abuse  HTN (hypertension)  History of Clostridium difficile intestinal infection  Liver cirrhosis  Migraine  Migraine  No Chronic Problems  Risk factors for obstructive sleep apnea  Seizure  Seizure  Seizure disorder    Past surgical history:  Never had surgery    Family history: MOTHER: Congestive heart failure  FATHER: Alcoholism    _    Social history:  _   Alcohol  Details: Alcohol Abuse in Household: Yes.  Use: Current.  IF YES, have you consumed this quantity 5 times or more in the last 30 Days? Yes.  Details: Alcohol Abuse in Household: Yes.  Use: Current.  If current Alcohol user: More than 5 (M) or 4 (F) drinks within a couple of hours? Yes.  Details: Alcohol Abuse in Household: Yes.  Use: Current.  If current Alcohol user: More than 5 (M) or 4 (F) drinks within a couple of hours? Yes.  Details: Alcohol Abuse in Household: Yes.  Use: Current.  If current Alcohol user: More than 5 (M) or 4 (F) drinks within a couple of hours? Yes.  IF YES, have you consumed this quantity 5 times or more in the last 30 Days? Yes.  Details: Use: Current.  Frequency: Daily.  Last Use: 7/13/17.  Blackouts: Denies.  Details: Use: Current.  Frequency: Daily.; Comment(s): unable to verbalize how much. states \"a lot\"  Details: Use: Current.  Frequency: Daily.  Last Use: yesterday.  Blackouts: Denies.  Change in Tolerance: Denies.  Loss of Control: Denies.  Age at First Use: 15 Years.  Age of Regular Use: 36 Years.  History of Withdrawal Symptoms: Delirium Tremens, Hallucinations, Irritability, Nausea/Vomiting, Seizures.  Areas Affected by Abuse:  Family, Employment/School.; Comment(s): pt states he drinks 6 pack of beer, 10 shots of vodka daily.  Pt. refused  to tell how many does he drins today. Uncooperative w/ his history.  Exercise  Details: Exercise: Never.  Details: Exercise: Never.  Details: Excercise: Occasionally.  Home/Environment  Details: Alcohol Abuse in Household: Yes.  Substance Abuse in Household: No.  Smoker in Household: Yes.  Patient Lives With: Brother.  Ambulation: Independent.  Bathing: Independent.  Dressing: Independent.  Driving: Independent.  Eating: Independent.  Elimination: Independent.  Grooming: Independent.  Preparing Meal: Independent.  Taking Meds: Independent.  Toileting: Independent.  Transfers: Independent.  Sexual  Details: Sexual orientation: Straight or heterosexual.  Gender Identity: Identifies as male.  Gender on Ins: Male.  Preferred Pronouns: Male.  Male Birth Sex:.  Details: Sexual orientation: Straight or heterosexual.  Gender Identity: Identifies as male.  Gender on Ins: Male.  Preferred Pronouns: Male.  Substance Abuse  Details: Substance Abuse in Household: No.  Use: None.  Details: Use: None.  Details: Substance Abuse in Household: No.  Details: Substance Abuse in Household: No.  Use: None.  Details: Use: None.  Tobacco  Details: Smoker in Houshold: Yes.  Smoked/Smokeless Tobacco Last 30 Days: Yes.  Smoking Tobacco Use: Current every day smoker.  Smokeless Tobacco Use Never.  Type: Cigarettes.  Cigarette Packs/Day: 1 Pack Per Day.  Started Age: 20.0 Years.; Comment(s): still smokes  Details: Smoked/Smokeless Tobacco Last 30 Days: Yes.  Smoking Tobacco Use: Current every day smoker.  Smokeless Tobacco Use Never.  Details: Smoker in Houshold: Yes.  Smoked/Smokeless Tobacco Last 30 Days: Yes.  Smoking Tobacco Use: Current every day smoker.  Smokeless Tobacco Use Never.  Type: Cigarettes.  Cigarette Packs/Day: 1 Pack Per Day.  Details: Smoker in Houshold: Yes.  Smoked/Smokeless Tobacco Last 30 Days: Yes.  Smoking  Tobacco Use: Current every day smoker.  Smokeless Tobacco Use Never.  Type: Cigarettes.  Cigarette Packs/Day: 1 Pack Per Day.  Started Age: 18.0 Years.  Details: Smoked/Smokeless Tobacco Last 30 Days: Yes.  Use: Current every day smoker.  Type: Cigarettes.  Cigarette Packs/Day: 1 Pack Per Day.  Details: Used in Last 12 Months: Yes.  Use: Current every day smoker.  Type: Cigarettes.  Cigarette Packs/Day: 3 Packs Per Day.  Started Age: 17.0 Years.  Cultural/Confucianism Practices  Details: Confucianism or Cultural Practices While in Hospital: No.  Details: Confucianism or Cultural Practices: Adventist.  Confucianism or Cultural Practices While in Hospital: No.  Details: Confucianism or Cultural Practices: Adventist.  Confucianism or Cultural Practices While in Hospital: Yes.  Details: Confucianism or Cultural Practices While in Hospital: No.     Home Medications (11) Active  acetaminophen oral 325 mg tablet (Tylenol) 650 mg = 2 tab, PRN, Oral, Q4H  acetaminophen oral 500 mg tablet (Tylenol) 500 mg = 1 tab, Oral, Q4H  cephalexin 500 mg oral tablet 500 mg = 1 tab, Oral, Q6H  cholecalciferol (vitamin D3) oral 1,000 unit tablet 2,000 unit = 2 tab, Oral, TID [after meals]  folic acid (vitamin B9) oral 1 mg tablet 1 mg = 1 tab, Oral, Daily  folic acid (vitamin B9) oral 1 mg tablet 1 mg = 1 tab, Oral, Daily  Keppra oral 750 mg tablet 750 mg = 1 tab, Oral, Q12H  Multiple Vitamins oral capsule , Oral, Daily  naltrexone oral 50 mg tablet 50 mg = 1 tab, Oral, Daily  thiamine (vitamin B1) oral 100 mg tablet 100 mg = 1 tab, Oral, Daily  traZODone oral 50 mg tablet (Desyrel) 50 mg = 1 tab, PRN, Oral, Q Bedtime     Allergies (2) Active Reaction  NKA None Documented  No Known Medication Allergies None Documented      Immunizations:  Pneumovax _  Flu shot _        Vitals between:   12-SEP-2019 15:12:56   TO   13-SEP-2019 15:12:56                   LAST RESULT MINIMUM MAXIMUM  Temperature 36.7 36.7 36.7  Heart Rate 114 114 114  Respiratory Rate 17 17  17  NISBP           104 104 115  NIDBP           76 75 85  NIMBP           86 86 95  SpO2                    96 95 96    Physical exam:  General: Diaphoretic and disabled male  Eyes : no icterus  ENT: normal mucous membranes    pinna normal     Neck: Throat midline. No lymphadenopathy.No JVD  Respiratory: Lungs clear . No wheezes.  Cardio: Normal S1/S2.  No murmurs, clicks, gallops, rubs. No  leg edema  Gastrointestinal: Soft, NT, ND. +BS   Muskuloskeletal: No gouty deposits  Neuro: Moves all extremities.  Refuses to cooperate with exam on strength  Skin: Dry lesions on his right hip and knees  psychiatric : mood appropriate  sepsis focused exam perfusion normal    Vital Signs    Vitals between:   12-SEP-2019 15:12:56   TO   13-SEP-2019 15:12:56                   LAST RESULT MINIMUM MAXIMUM  Temperature 36.7 36.7 36.7  Heart Rate 114 114 114  Respiratory Rate 17 17 17  NISBP           104 104 115  NIDBP           76 75 85  NIMBP           86 86 95  SpO2                    96 95 96       Cardiopulmonary  cardiovascular    Labs:     Labs between:  12-SEP-2019 15:12 to 13-SEP-2019 15:12    CBC:                 WBC  HgB  Hct  Plt  MCV  RDW   13-SEP-2019 (H) 20.9  (L) 11.2  (L) 30.7  (L) 99  85.8  12.8     DIFF:                 Seg  Neutroph//ABS  Lymph//ABS  Mono//ABS  EOS/ABS  13-SEP-2019 NOT APPLICABLE  86 // (H) 18.0  10 // 2.0 3 // 0.7 0 // (L) 0.0     CMP:                 AST  ALT  AlkPhos  Bili  Albumin   13-SEP-2019 (H) 54  50  69  0.4  (L) 3.1     COAG:                 INR  PT  PTT  Ddimer  Fibrinogen    13-SEP-2019 0.9  10.1  24                        Cardiovascular Labs     unit/L (09/13/19 11:21)     Respiratory Labs    No Resp results found over the defined time period  Neurological Labs   No neurological lab results were found over the previous 24 hours.     Radiology:       Result title:  XR CHEST 1V  Result status:  Final  Verified by:  REBECCA MONTEJO on 09/13/2019 4:31  IMPRESSION: Left lower  lobe/lingular opacity is nonspecific for subsegmental atelectasis or evolving or resolving pneumonia.          Assessment/Plan:   41-year-old with a history of alcohol abuse, DTs, seizures, pancreatitis, hypertension, C. difficile, nonischemic cardiomyopathy although last echo in 7//2018 showed ejection fraction of 55% normal systolic function, thrombocytopenia, bipolar admitted with alcohol intoxication, chest pain and aspiration pneumonia        Acute alcohol intoxication hydrate, CIWA protocol    Sepsis sirs plus pneumonia fluids given     Aspiration pneumonia IV Zosyn    Elevated liver function due to alcohol trend    seizure keppra    vomiting check lipase    Chest pain possible ACS consult cardiology, serial troponins    Thrombocytopenia usually resolves with removal of alcohol    Elevated liver functions due to alcohol trend    Anemia trend    Leukocytosis trend    Acute hypokalemia replace    Acute hyponatremia hydrate    elevated sugar  check hbg a1c      _     I personally reviewed ecg and agree with interpretation.      35 minutes were spent in critical care with patient and staff , interpretation of labs and radiology, monitoring medications and coordination of care      The emergency room physician and I discussed different diagnoses and treatment methods.    DVT prophylaxis - heparin sq    dnr status : full code   n/a

## 2019-12-15 ENCOUNTER — FORM ENCOUNTER (OUTPATIENT)
Age: 24
End: 2019-12-15

## 2019-12-16 ENCOUNTER — APPOINTMENT (OUTPATIENT)
Dept: NUCLEAR MEDICINE | Facility: IMAGING CENTER | Age: 24
End: 2019-12-16
Payer: COMMERCIAL

## 2019-12-16 ENCOUNTER — OUTPATIENT (OUTPATIENT)
Dept: OUTPATIENT SERVICES | Facility: HOSPITAL | Age: 24
LOS: 1 days | End: 2019-12-16
Payer: COMMERCIAL

## 2019-12-16 DIAGNOSIS — C81.90 HODGKIN LYMPHOMA, UNSPECIFIED, UNSPECIFIED SITE: ICD-10-CM

## 2019-12-16 PROCEDURE — 78815 PET IMAGE W/CT SKULL-THIGH: CPT | Mod: 26,PI

## 2019-12-16 PROCEDURE — 78815 PET IMAGE W/CT SKULL-THIGH: CPT

## 2019-12-16 PROCEDURE — A9552: CPT

## 2019-12-18 ENCOUNTER — APPOINTMENT (OUTPATIENT)
Dept: SURGERY | Facility: CLINIC | Age: 24
End: 2019-12-18
Payer: COMMERCIAL

## 2019-12-18 LAB
HEMATOPATHOLOGY REPORT: SIGNIFICANT CHANGE UP
TM INTERPRETATION: SIGNIFICANT CHANGE UP

## 2019-12-18 PROCEDURE — 99024 POSTOP FOLLOW-UP VISIT: CPT

## 2019-12-18 NOTE — HISTORY OF PRESENT ILLNESS
[de-identified] : Patient referred by Dr. Robert for evaluation of left supraclavicular adenopathy. One month ago patient noted left neck swelling complaining of fatigue and whole body itching, with chills and increased bruising. Patient has not had imaging performed, denies change in weight, history of skin cancer her recent illness.\par 12/13/19 excision left cervical LNs, path pending. denies pain or swelling, has appt with oncologist, Dr hurt 12/24/19

## 2019-12-18 NOTE — REVIEW OF SYSTEMS
[As Noted in HPI] : as noted in HPI [Easy Bleeding] : a tendency for easy bleeding [Swollen Glands In The Neck] : swollen glands in the neck [Negative] : Neurological

## 2019-12-18 NOTE — PHYSICAL EXAM
[de-identified] : left  cervical and supraclavicular adenopathy, trachea midline, thyroid without enlargement or palpable mass, incision healing, scar min discussed.  [Normal] : salivary glands are normal [de-identified] : Skin:  normal appearance.  no rash, nodules, vesicles, or erythema,\par Musculoskeletal:  full range of motion and no deformities appreciated\par Neurological:  grossly intact\par Psychiatric:  oriented to person, place and time with appropriate affect

## 2019-12-24 ENCOUNTER — APPOINTMENT (OUTPATIENT)
Dept: HEMATOLOGY ONCOLOGY | Facility: CLINIC | Age: 24
End: 2019-12-24
Payer: COMMERCIAL

## 2019-12-24 ENCOUNTER — RESULT REVIEW (OUTPATIENT)
Age: 24
End: 2019-12-24

## 2019-12-24 ENCOUNTER — OUTPATIENT (OUTPATIENT)
Dept: OUTPATIENT SERVICES | Facility: HOSPITAL | Age: 24
LOS: 1 days | End: 2019-12-24
Payer: COMMERCIAL

## 2019-12-24 VITALS
HEART RATE: 105 BPM | WEIGHT: 228.16 LBS | TEMPERATURE: 98.8 F | SYSTOLIC BLOOD PRESSURE: 137 MMHG | DIASTOLIC BLOOD PRESSURE: 91 MMHG | BODY MASS INDEX: 31.94 KG/M2 | HEIGHT: 70.87 IN | OXYGEN SATURATION: 100 % | RESPIRATION RATE: 17 BRPM

## 2019-12-24 DIAGNOSIS — C81.70 OTHER HODGKIN LYMPHOMA, UNSPECIFIED SITE: ICD-10-CM

## 2019-12-24 LAB
BASOPHILS # BLD AUTO: 0 K/UL — SIGNIFICANT CHANGE UP (ref 0–0.2)
BASOPHILS NFR BLD AUTO: 0.2 % — SIGNIFICANT CHANGE UP (ref 0–2)
EOSINOPHIL # BLD AUTO: 0.1 K/UL — SIGNIFICANT CHANGE UP (ref 0–0.5)
EOSINOPHIL NFR BLD AUTO: 1.1 % — SIGNIFICANT CHANGE UP (ref 0–6)
ERYTHROCYTE [SEDIMENTATION RATE] IN BLOOD: 22 MM/HR — HIGH (ref 0–15)
HCT VFR BLD CALC: 46.3 % — HIGH (ref 34.5–45)
HGB BLD-MCNC: 15.1 G/DL — SIGNIFICANT CHANGE UP (ref 11.5–15.5)
LYMPHOCYTES # BLD AUTO: 1 K/UL — SIGNIFICANT CHANGE UP (ref 1–3.3)
LYMPHOCYTES # BLD AUTO: 10.1 % — LOW (ref 13–44)
MCHC RBC-ENTMCNC: 30.9 PG — SIGNIFICANT CHANGE UP (ref 27–34)
MCHC RBC-ENTMCNC: 32.7 G/DL — SIGNIFICANT CHANGE UP (ref 32–36)
MCV RBC AUTO: 94.4 FL — SIGNIFICANT CHANGE UP (ref 80–100)
MONOCYTES # BLD AUTO: 0.4 K/UL — SIGNIFICANT CHANGE UP (ref 0–0.9)
MONOCYTES NFR BLD AUTO: 4.2 % — SIGNIFICANT CHANGE UP (ref 2–14)
NEUTROPHILS # BLD AUTO: 8.8 K/UL — HIGH (ref 1.8–7.4)
NEUTROPHILS NFR BLD AUTO: 84.5 % — HIGH (ref 43–77)
PLATELET # BLD AUTO: 330 K/UL — SIGNIFICANT CHANGE UP (ref 150–400)
RBC # BLD: 4.9 M/UL — SIGNIFICANT CHANGE UP (ref 3.8–5.2)
RBC # FLD: 11.4 % — SIGNIFICANT CHANGE UP (ref 10.3–14.5)
WBC # BLD: 10.4 K/UL — SIGNIFICANT CHANGE UP (ref 3.8–10.5)
WBC # FLD AUTO: 10.4 K/UL — SIGNIFICANT CHANGE UP (ref 3.8–10.5)

## 2019-12-24 PROCEDURE — 87205 SMEAR GRAM STAIN: CPT

## 2019-12-24 PROCEDURE — 88185 FLOWCYTOMETRY/TC ADD-ON: CPT

## 2019-12-24 PROCEDURE — 88305 TISSUE EXAM BY PATHOLOGIST: CPT

## 2019-12-24 PROCEDURE — 88313 SPECIAL STAINS GROUP 2: CPT

## 2019-12-24 PROCEDURE — 85097 BONE MARROW INTERPRETATION: CPT

## 2019-12-24 PROCEDURE — 88305 TISSUE EXAM BY PATHOLOGIST: CPT | Mod: 26

## 2019-12-24 PROCEDURE — 38222 DX BONE MARROW BX & ASPIR: CPT | Mod: RT

## 2019-12-24 PROCEDURE — 88184 FLOWCYTOMETRY/ TC 1 MARKER: CPT

## 2019-12-24 PROCEDURE — 88189 FLOWCYTOMETRY/READ 16 & >: CPT

## 2019-12-24 PROCEDURE — 88313 SPECIAL STAINS GROUP 2: CPT | Mod: 26

## 2019-12-24 PROCEDURE — 88237 TISSUE CULTURE BONE MARROW: CPT

## 2019-12-24 PROCEDURE — 99205 OFFICE O/P NEW HI 60 MIN: CPT

## 2019-12-24 PROCEDURE — 88264 CHROMOSOME ANALYSIS 20-25: CPT

## 2019-12-24 PROCEDURE — 88280 CHROMOSOME KARYOTYPE STUDY: CPT

## 2019-12-24 NOTE — REASON FOR VISIT
[Bone Marrow Biopsy] : bone marrow biopsy [Bone Marrow Aspiration] : bone marrow aspiration [Spouse] : spouse [FreeTextEntry2] : Suspected Hodgkin's lymphoma. Clinically presented with cervical lymphadenopathy

## 2019-12-24 NOTE — PROCEDURE
[Bone Marrow Biopsy] : bone marrow biopsy [Bone Marrow Aspiration] : bone marrow aspiration  [Patient] : the patient [Procedure verified and consent obtained] : procedure verified and consent obtained [Patient identification verified] : patient identification verified [Laterality verified and correct site marked] : laterality verified and correct site marked [Correct positioning] : correct positioning [Right] : site: right [Prone] : prone [Lidocaine was injected and into the periosteum overlying the site.] : Lidocaine was injected and into the periosteum overlying the site. [The right posterior iliac crest was prepped with betadine and draped, using sterile technique.] : The right posterior iliac crest was prepped with betadine and draped, using sterile technique. [Superior iliac spine was identified] : the superior iliac spine was identified. [Cytogenetics] : cytogenetics [Aspirate] : aspirate [Flow Cytometry] : flow cytometry [FISH] : FISH [Biopsy] : biopsy [FreeTextEntry1] : Suspected Hodgkin's lymphoma. Clinically presented with cervical lymphadenopathy [FreeTextEntry2] : WBC: 10.4\par Hgb: 15.1\par Hct: 46.3\par Plts: 330\par \par Bone marrow aspiration and biopsy were done. \par  [] : The patient was instructed to remove the bandage the following AM. The patient may bathe. Acetaminophen may be taken for discomfort, as per package directions.If there are any other problems, the patient was instructed to call the office. The patient verbalized understanding, and is aware of the office contact numbers.

## 2019-12-26 ENCOUNTER — APPOINTMENT (OUTPATIENT)
Dept: CARDIOLOGY | Facility: CLINIC | Age: 24
End: 2019-12-26
Payer: COMMERCIAL

## 2019-12-26 ENCOUNTER — APPOINTMENT (OUTPATIENT)
Dept: HEMATOLOGY ONCOLOGY | Facility: CLINIC | Age: 24
End: 2019-12-26

## 2019-12-26 ENCOUNTER — NON-APPOINTMENT (OUTPATIENT)
Age: 24
End: 2019-12-26

## 2019-12-26 ENCOUNTER — OTHER (OUTPATIENT)
Age: 24
End: 2019-12-26

## 2019-12-26 VITALS
BODY MASS INDEX: 32.64 KG/M2 | HEIGHT: 70 IN | SYSTOLIC BLOOD PRESSURE: 130 MMHG | TEMPERATURE: 97.8 F | WEIGHT: 228 LBS | OXYGEN SATURATION: 99 % | HEART RATE: 68 BPM | DIASTOLIC BLOOD PRESSURE: 86 MMHG

## 2019-12-26 VITALS — DIASTOLIC BLOOD PRESSURE: 82 MMHG | SYSTOLIC BLOOD PRESSURE: 130 MMHG

## 2019-12-26 DIAGNOSIS — D22.9 MELANOCYTIC NEVI, UNSPECIFIED: ICD-10-CM

## 2019-12-26 PROCEDURE — 0399T: CPT

## 2019-12-26 PROCEDURE — 93000 ELECTROCARDIOGRAM COMPLETE: CPT

## 2019-12-26 PROCEDURE — 99203 OFFICE O/P NEW LOW 30 MIN: CPT

## 2019-12-26 PROCEDURE — 93306 TTE W/DOPPLER COMPLETE: CPT

## 2019-12-26 NOTE — HISTORY OF PRESENT ILLNESS
[FreeTextEntry1] : This is a 24 year old lady with a recent diagnosis of Hodgkins Lymphoma presents today for cardiac evaluation prior to chemotherapy on 01/24/2020. Patient states that she is overall feeling good, however, she states that she has been feeling very fatigued. patient states that she has been experiencing itching all over her body, and has been taking Decadron to help with the itching. Patient denies dyspnea, palpitations, chest pain, nausea, vomiting, dizziness and lightheadedness.\par

## 2019-12-26 NOTE — PHYSICAL EXAM
[Normal Appearance] : normal appearance [Well Groomed] : well groomed [General Appearance - Well Nourished] : well nourished [General Appearance - Well Developed] : well developed [No Deformities] : no deformities [Normal Conjunctiva] : the conjunctiva exhibited no abnormalities [General Appearance - In No Acute Distress] : no acute distress [Normal Oral Mucosa] : normal oral mucosa [Eyelids - No Xanthelasma] : the eyelids demonstrated no xanthelasmas [No Oral Pallor] : no oral pallor [Normal Jugular Venous A Waves Present] : normal jugular venous A waves present [Normal Jugular Venous V Waves Present] : normal jugular venous V waves present [No Oral Cyanosis] : no oral cyanosis [No Jugular Venous Holm A Waves] : no jugular venous holm A waves [Heart Rate And Rhythm] : heart rate and rhythm were normal [Heart Sounds] : normal S1 and S2 [Murmurs] : no murmurs present [Respiration, Rhythm And Depth] : normal respiratory rhythm and effort [Abdomen Soft] : soft [Exaggerated Use Of Accessory Muscles For Inspiration] : no accessory muscle use [Auscultation Breath Sounds / Voice Sounds] : lungs were clear to auscultation bilaterally [Abdomen Mass (___ Cm)] : no abdominal mass palpated [Abdomen Tenderness] : non-tender [Gait - Sufficient For Exercise Testing] : the gait was sufficient for exercise testing [Cyanosis, Localized] : no localized cyanosis [Nail Clubbing] : no clubbing of the fingernails [Abnormal Walk] : normal gait [Petechial Hemorrhages (___cm)] : no petechial hemorrhages [Skin Color & Pigmentation] : normal skin color and pigmentation [Skin Lesions] : no skin lesions [No Skin Ulcers] : no skin ulcer [No Venous Stasis] : no venous stasis [] : no rash [No Xanthoma] : no  xanthoma was observed [Oriented To Time, Place, And Person] : oriented to person, place, and time [No Anxiety] : not feeling anxious [Affect] : the affect was normal [Mood] : the mood was normal [FreeTextEntry1] : nevi noted

## 2019-12-26 NOTE — REVIEW OF SYSTEMS
[Itching] : itching [Negative] : Endocrine [Feeling Fatigued] : feeling fatigued [Skin: A Rash] : no rash: [Change In Color Of Skin] : change in skin color [Skin Lesions] : no skin lesions

## 2019-12-27 LAB
ALBUMIN SERPL ELPH-MCNC: 4.9 G/DL
ALP BLD-CCNC: 115 U/L
ALT SERPL-CCNC: 12 U/L
ANION GAP SERPL CALC-SCNC: 23 MMOL/L
APTT BLD: 30.2 SEC
AST SERPL-CCNC: 15 U/L
BILIRUB SERPL-MCNC: 0.2 MG/DL
BUN SERPL-MCNC: 13 MG/DL
CALCIUM SERPL-MCNC: 10.4 MG/DL
CHLORIDE SERPL-SCNC: 102 MMOL/L
CO2 SERPL-SCNC: 21 MMOL/L
CREAT SERPL-MCNC: 0.77 MG/DL
GLUCOSE SERPL-MCNC: 99 MG/DL
HBV CORE IGG+IGM SER QL: NONREACTIVE
HBV SURFACE AB SER QL: REACTIVE
HBV SURFACE AG SER QL: NONREACTIVE
HCV AB SER QL: NONREACTIVE
HCV S/CO RATIO: 0.19 S/CO
HEMATOPATHOLOGY REPORT: SIGNIFICANT CHANGE UP
HIV1+2 AB SPEC QL IA.RAPID: NONREACTIVE
INR PPP: 1.05 RATIO
LDH SERPL-CCNC: 264 U/L
POTASSIUM SERPL-SCNC: 4.3 MMOL/L
PROT SERPL-MCNC: 8 G/DL
PT BLD: 11.9 SEC
SODIUM SERPL-SCNC: 146 MMOL/L
TM INTERPRETATION: SIGNIFICANT CHANGE UP

## 2019-12-27 NOTE — RESULTS/DATA
[FreeTextEntry1] : \par  Pathology             Final\par \par No Documents Attached\par \par \par \par \par   [facility] Accession Number : [id number]\par \par FAB[facility] WAKEFIELD                     2\par \par \par \par Hematopathology Report\par \par \par \par \par Final Diagnosis\par _\par 1.  Lymph node, cervical, left:\par - Classical Hodgkin's lymphoma\par \par See note and description.\par \par Diagnostic note:\par The histologic and immunohistochemical findings are diagnostic of\par Classical Hodgkin's Lymphoma.\par \par Microscopic description:\par Histologic sections show a lymph node with completely effaced\par lyubov architecture. The large Hodgkin/Forrest-Eduar (HRS) cells\par and smudge cells are identified in the back ground of extensive\par necrosis as well as mixed inflammatory infiltrate including\par eosinophils, neutrophils and plasma cells.\par \par Immunohistochemical stains for CD30, CD15, PAX5, CD20, CD3, MUM1,\par EBV encoded RNA (ANNALISA) in situ hybridization, CD79a, CD45 stains\par performed on formalin fixed paraffin embedded tissue, block 1A.\par \par Neoplastic cells are:  Hodgkin/Forrest-Eduar (HRS) cells\par Positive:  CD30, CD15, PAX-5 (weak), MUM1\par Negative:  CD45, CD79a, CD20, CD3, ANNALISA\par \par Additional stains in progress an addendum to follow\par \par Flow cytometry analysis:\par Lymphocytes (52% of cells): Heterogeneous population of T-cells\par (with normal CD4 to CD8 ratio), natural killer cells, and\par polytypic B-cells. The lymphocyte immunophenotypic findings show\par no diagnostic abnormalities.\par \par Slide(s) with built in immunohistochemical study control(s)\par associated and negative control with this case has/have been\par verified by the sign out pathologist.\par For slide(s) without built in controls positive control slides\par has/have been reviewed and approved by immunohistochemistry lab\par These immunohistochemical/ in-situ hybridization tests have been\par developed and their performance characteristics determined by\par [facility], Department of\par Pathology, Division of Immunopathology, [address] [place] [address]\[place] [address].  It has not been cleared or approved by the\par U.S. Food and Drug Administration.  The FDA has determined that\par such clearance or approval is not necessary.  This test is used\par for clinical purposes.  The laboratory is certified under the\par CLIA-88 as qualified to perform high\par \par \par \par \par \par [facility]KE[facility]                     2\par \par \par \par Hematopathology Report\par \par \par \par \par complexity clinical testing.\par \par Verified by: Blair Rojas M.D.\par (Electronic Signature)\par Reported on: 12/18/19 16:18 EST, [address] [facility]. [address],\[place] [address]\par Phone: [phone]   Fax: [phone]\par _________________________________________________________________\par \par Clinical History\par N/A\par \par Specimen(s) Submitted\par 1-Left cervical lymph node\par \par Gross Description\par \par The specimen is received fresh and the specimen container is\par labeled: Left cervical lymph node.  It consists of a 2.5 x 2.0 x\par 1.9 cm a bisected lymph node.  The specimen is further sectioned\par and entirely submitted in four cassettes.  Lymphoma workup is\par performed.\par \par In addition to other data that may appear on the specimen\par container, the label has been inspected to confirm the presence\par of the patient's name and date of birth.\par \par Sis Yu 12/13/2019 18:12\par \par  \par \par  Ordered by: CASA SANTOS       Collected/Examined: [id number] 01:39PM       \par Verified by: CASA SANTOS [id number] 01:41PM       \par  Result Communication: No patient communication needed at this time;\par Stage: Final       \par  Performed at: [facility]       Resulted: [id number] 04:18PM       Last Updated: [id number] 01:41PM       Accession: [id number]

## 2019-12-27 NOTE — REASON FOR VISIT
[Initial Consultation] : an initial consultation for [Spouse] : spouse [Family Member] : family member [Parent] : parent [FreeTextEntry2] : Hodgkins Lymphoma

## 2019-12-27 NOTE — REVIEW OF SYSTEMS
[Skin Rash] : skin rash [Swollen Glands] : swollen glands [Negative] : Allergic/Immunologic [FreeTextEntry2] : pruritus

## 2019-12-27 NOTE — PHYSICAL EXAM
[Fully active, able to carry on all pre-disease performance without restriction] : Status 0 - Fully active, able to carry on all pre-disease performance without restriction [Normal] : affect appropriate [de-identified] : overweight [de-identified] : cervical and axillary adenopathy [de-identified] : deferred [de-identified] : palpable cervical and axillary adenopathy, well healed L neck surgical scar [de-identified] : lacy erythematous rash on UEs

## 2019-12-27 NOTE — HISTORY OF PRESENT ILLNESS
[Disease:__________________________] : Disease: [unfilled] [de-identified] : 25 yo female with newly diagnosed Hodgkins lymphoma. Pt states in Nov 2019 she noticed neck adenopathy, associated with pruritus and skin rash. She went to see her pcp who referred her for ENT evaluation. Excisional node biopsy was done which was c/w Hodgkins lymphoma. Pt also had PET/CT done which showed adenopathy in chest, neck and supraclavicular as well as axillary areas. She denies any night sweats but does have some increased warmth. She reports the pruritus prevents her from sleeping. She has a cousin who had a diagnosis of Hodgkins lymphoma at age 27. Pt is here today with her live-in boyfriend, close family friend and mother. She is tearful and concerned about effects of chemotherapy. She has some information about diagnosis and treatment already - based on her own review. She is eager to start treatment.  [de-identified] : II [de-identified] : \par  Pathology             Final\par \par No Documents Attached\par \par \par \par \par   [facility] Accession Number : [id number]\par \par FABIAN WAKEFIELD                     2\par \par \par \par Hematopathology Report\par \par \par \par \par Final Diagnosis\par _\par 1.  Lymph node, cervical, left:\par - Classical Hodgkin's lymphoma\par \par See note and description.\par \par Diagnostic note:\par The histologic and immunohistochemical findings are diagnostic of\par Classical Hodgkin's Lymphoma.\par \par Microscopic description:\par Histologic sections show a lymph node with completely effaced\par lyubov architecture. The large Hodgkin/Forrest-Eduar (HRS) cells\par and smudge cells are identified in the back ground of extensive\par necrosis as well as mixed inflammatory infiltrate including\par eosinophils, neutrophils and plasma cells.\par \par Immunohistochemical stains for CD30, CD15, PAX5, CD20, CD3, MUM1,\par EBV encoded RNA (ANNALISA) in situ hybridization, CD79a, CD45 stains\par performed on formalin fixed paraffin embedded tissue, block 1A.\par \par Neoplastic cells are:  Hodgkin/Forrest-Eduar (HRS) cells\par Positive:  CD30, CD15, PAX-5 (weak), MUM1\par Negative:  CD45, CD79a, CD20, CD3, ANNALISA\par \par Additional stains in progress an addendum to follow\par \par Flow cytometry analysis:\par Lymphocytes (52% of cells): Heterogeneous population of T-cells\par (with normal CD4 to CD8 ratio), natural killer cells, and\par polytypic B-cells. The lymphocyte immunophenotypic findings show\par no diagnostic abnormalities.\par \par Slide(s) with built in immunohistochemical study control(s)\par associated and negative control with this case has/have been\par verified by the sign out pathologist.\par For slide(s) without built in controls positive control slides\par has/have been reviewed and approved by immunohistochemistry lab\par These immunohistochemical/ in-situ hybridization tests have been\par developed and their performance characteristics determined by\par [facility] / [facility] [facility], Department of\par Pathology, Division of Immunopathology, [address] [place] [address]\[place] [address].  It has not been cleared or approved by the\par U.S. Food and Drug Administration.  The FDA has determined that\par such clearance or approval is not necessary.  This test is used\par for clinical purposes.  The laboratory is certified under the\par CLIA-88 as qualified to perform high\par \par \par \par \par \par FABIAN WAKEFIELD                     2\par \par \par \par Hematopathology Report\par \par \par \par \par complexity clinical testing.\par \par Verified by: Blair Rojas M.D.\par (Electronic Signature)\par Reported on: 12/18/19 16:18 EST, [address] [facility],\[place] [address]\par Phone: [phone]   Fax: [phone]\par _________________________________________________________________\par \par Clinical History\par N/A\par \par Specimen(s) Submitted\par 1-Left cervical lymph node\par \par Gross Description\par \par The specimen is received fresh and the specimen container is\par labeled: Left cervical lymph node.  It consists of a 2.5 x 2.0 x\par 1.9 cm a bisected lymph node.  The specimen is further sectioned\par and entirely submitted in four cassettes.  Lymphoma workup is\par performed.\par \par In addition to other data that may appear on the specimen\par container, the label has been inspected to confirm the presence\par of the patient's name and date of birth.\par \par Sis Yu 12/13/2019 18:12\par \par  \par \par  Ordered by: CASA SANTOS       Collected/Examined: [id number] 01:39PM       \par Verified by: CASA SANTOS [id number] 01:41PM       \par  Result Communication: No patient communication needed at this time;\par Stage: Final       \par  Performed at: [facility]       Resulted: [id number] 04:18PM       Last Updated: [id number] 01:41PM       Accession: [id number]

## 2019-12-28 LAB
25(OH)D3 SERPL-MCNC: 21.7 NG/ML
ANION GAP SERPL CALC-SCNC: 12 MMOL/L
BUN SERPL-MCNC: 10 MG/DL
CALCIUM SERPL-MCNC: 10.4 MG/DL
CHLORIDE SERPL-SCNC: 99 MMOL/L
CHOLEST SERPL-MCNC: 185 MG/DL
CHOLEST/HDLC SERPL: 3.6 RATIO
CO2 SERPL-SCNC: 28 MMOL/L
CREAT SERPL-MCNC: 0.67 MG/DL
ESTIMATED AVERAGE GLUCOSE: 108 MG/DL
FERRITIN SERPL-MCNC: 177 NG/ML
FOLATE SERPL-MCNC: 4.5 NG/ML
GLUCOSE SERPL-MCNC: 94 MG/DL
HBA1C MFR BLD HPLC: 5.4 %
HDLC SERPL-MCNC: 52 MG/DL
IRON SERPL-MCNC: 107 UG/DL
LDLC SERPL CALC-MCNC: 109 MG/DL
POTASSIUM SERPL-SCNC: 4.6 MMOL/L
SODIUM SERPL-SCNC: 139 MMOL/L
TRIGL SERPL-MCNC: 122 MG/DL
TSH SERPL-ACNC: 0.77 UIU/ML
VIT B12 SERPL-MCNC: 483 PG/ML

## 2019-12-29 ENCOUNTER — FORM ENCOUNTER (OUTPATIENT)
Age: 24
End: 2019-12-29

## 2019-12-30 ENCOUNTER — APPOINTMENT (OUTPATIENT)
Dept: ENDOVASCULAR SURGERY | Facility: CLINIC | Age: 24
End: 2019-12-30
Payer: COMMERCIAL

## 2019-12-30 VITALS
OXYGEN SATURATION: 98 % | HEART RATE: 68 BPM | DIASTOLIC BLOOD PRESSURE: 81 MMHG | TEMPERATURE: 98.6 F | BODY MASS INDEX: 32.93 KG/M2 | RESPIRATION RATE: 18 BRPM | SYSTOLIC BLOOD PRESSURE: 117 MMHG | HEIGHT: 70 IN | WEIGHT: 230 LBS

## 2019-12-30 PROCEDURE — 77001 FLUOROGUIDE FOR VEIN DEVICE: CPT

## 2019-12-30 PROCEDURE — 36561 INSERT TUNNELED CV CATH: CPT

## 2019-12-30 PROCEDURE — 76937 US GUIDE VASCULAR ACCESS: CPT

## 2019-12-30 NOTE — PROCEDURE
[D/C IV on discharge] : D/C IV on discharge [Resume diet] : resume diet [Site check for bleeding/hematoma] : Site check for bleeding/hematoma [Vital signs on admission the q 15 mins x2] : Vital signs on admission the q 15 mins x2 [FreeTextEntry1] : right chest mediport insertion

## 2019-12-30 NOTE — ASSESSMENT
[FreeTextEntry1] : 24F lymphoma\par Needs port\par Informed consent obtained\par INR 1.05\par Plts 330

## 2019-12-30 NOTE — PAST MEDICAL HISTORY
[Malignancy] : Malignancy [No therapy indicated for cases scheduled for less than one hour] : No therapy indicated for cases scheduled for less than one hour. [FreeTextEntry1] : Malignant Hyperthermia Screening Tool and Risk of Bleeding Assessment\par \par Ms. FABIAN WAKEFIELD denies family history of unexpected death following Anesthesia or Exercise.\par Denies Family history of Malignant Hyperthermia, Muscle or Neuromuscular disorder and High Temperature following exercise.\par \par Ms. FABIAN WAKEFIELD denies history of Muscle Spasm, Dark or Chocolate - Colored urine and Unanticipated fever immediately following anesthesia or serious exercise. \par Ms. WAKEFIELD also denies bleeding tendencies/ Risks of Bleeding.\par

## 2019-12-30 NOTE — HISTORY OF PRESENT ILLNESS
[FreeTextEntry1] : accompanied by Cesar Abernathy [phone]\par no reported falls\par alert and oriented\par last menses 12-15\par urine preganancy - Neg\par  [FreeTextEntry4] : n/a [FreeTextEntry5] : yesterday at 11pm [FreeTextEntry6] : Dr. Qiu

## 2019-12-31 ENCOUNTER — APPOINTMENT (OUTPATIENT)
Dept: INFUSION THERAPY | Facility: HOSPITAL | Age: 24
End: 2019-12-31

## 2020-01-06 ENCOUNTER — APPOINTMENT (OUTPATIENT)
Dept: PULMONOLOGY | Facility: CLINIC | Age: 25
End: 2020-01-06
Payer: COMMERCIAL

## 2020-01-06 PROCEDURE — 94010 BREATHING CAPACITY TEST: CPT

## 2020-01-06 PROCEDURE — 94729 DIFFUSING CAPACITY: CPT

## 2020-01-06 PROCEDURE — 94726 PLETHYSMOGRAPHY LUNG VOLUMES: CPT

## 2020-01-08 LAB — CHROM ANALY OVERALL INTERP SPEC-IMP: SIGNIFICANT CHANGE UP

## 2020-01-12 ENCOUNTER — MOBILE ON CALL (OUTPATIENT)
Age: 25
End: 2020-01-12

## 2020-01-13 ENCOUNTER — OUTPATIENT (OUTPATIENT)
Dept: OUTPATIENT SERVICES | Facility: HOSPITAL | Age: 25
LOS: 1 days | Discharge: HOME | End: 2020-01-13

## 2020-01-13 DIAGNOSIS — C81.70 OTHER HODGKIN LYMPHOMA, UNSPECIFIED SITE: ICD-10-CM

## 2020-01-17 ENCOUNTER — LABORATORY RESULT (OUTPATIENT)
Age: 25
End: 2020-01-17

## 2020-01-17 ENCOUNTER — RESULT REVIEW (OUTPATIENT)
Age: 25
End: 2020-01-17

## 2020-01-17 ENCOUNTER — APPOINTMENT (OUTPATIENT)
Dept: INFUSION THERAPY | Facility: HOSPITAL | Age: 25
End: 2020-01-17

## 2020-01-17 LAB
BASOPHILS # BLD AUTO: 0 K/UL — SIGNIFICANT CHANGE UP (ref 0–0.2)
BASOPHILS NFR BLD AUTO: 0.1 % — SIGNIFICANT CHANGE UP (ref 0–2)
EOSINOPHIL # BLD AUTO: 0.1 K/UL — SIGNIFICANT CHANGE UP (ref 0–0.5)
EOSINOPHIL NFR BLD AUTO: 0.4 % — SIGNIFICANT CHANGE UP (ref 0–6)
HCT VFR BLD CALC: 44 % — SIGNIFICANT CHANGE UP (ref 34.5–45)
HGB BLD-MCNC: 14.3 G/DL — SIGNIFICANT CHANGE UP (ref 11.5–15.5)
LYMPHOCYTES # BLD AUTO: 0.8 K/UL — LOW (ref 1–3.3)
LYMPHOCYTES # BLD AUTO: 6.8 % — LOW (ref 13–44)
MCHC RBC-ENTMCNC: 31.2 PG — SIGNIFICANT CHANGE UP (ref 27–34)
MCHC RBC-ENTMCNC: 32.5 G/DL — SIGNIFICANT CHANGE UP (ref 32–36)
MCV RBC AUTO: 96 FL — SIGNIFICANT CHANGE UP (ref 80–100)
MONOCYTES # BLD AUTO: 0.5 K/UL — SIGNIFICANT CHANGE UP (ref 0–0.9)
MONOCYTES NFR BLD AUTO: 4.2 % — SIGNIFICANT CHANGE UP (ref 2–14)
NEUTROPHILS # BLD AUTO: 10.8 K/UL — HIGH (ref 1.8–7.4)
NEUTROPHILS NFR BLD AUTO: 88.4 % — HIGH (ref 43–77)
PLATELET # BLD AUTO: 236 K/UL — SIGNIFICANT CHANGE UP (ref 150–400)
RBC # BLD: 4.58 M/UL — SIGNIFICANT CHANGE UP (ref 3.8–5.2)
RBC # FLD: 12.6 % — SIGNIFICANT CHANGE UP (ref 10.3–14.5)
WBC # BLD: 12.3 K/UL — HIGH (ref 3.8–10.5)
WBC # FLD AUTO: 12.3 K/UL — HIGH (ref 3.8–10.5)

## 2020-01-17 RX ORDER — DIPHENHYDRAMINE HCL 50 MG
1 CAPSULE ORAL
Qty: 0 | Refills: 0 | DISCHARGE

## 2020-01-17 RX ORDER — ACETAMINOPHEN, GUAIFENESIN, AND PHENYLEPHRINE HYDROCHLORIDE 325; 200; 5 MG/1; MG/1; MG/1
2 TABLET, COATED ORAL
Qty: 0 | Refills: 0 | DISCHARGE

## 2020-01-21 DIAGNOSIS — Z51.11 ENCOUNTER FOR ANTINEOPLASTIC CHEMOTHERAPY: ICD-10-CM

## 2020-01-21 DIAGNOSIS — R11.2 NAUSEA WITH VOMITING, UNSPECIFIED: ICD-10-CM

## 2020-01-22 ENCOUNTER — RESULT REVIEW (OUTPATIENT)
Age: 25
End: 2020-01-22

## 2020-01-22 ENCOUNTER — APPOINTMENT (OUTPATIENT)
Dept: HEMATOLOGY ONCOLOGY | Facility: CLINIC | Age: 25
End: 2020-01-22
Payer: COMMERCIAL

## 2020-01-22 VITALS
DIASTOLIC BLOOD PRESSURE: 82 MMHG | SYSTOLIC BLOOD PRESSURE: 123 MMHG | RESPIRATION RATE: 16 BRPM | BODY MASS INDEX: 33.59 KG/M2 | OXYGEN SATURATION: 94 % | WEIGHT: 234.13 LBS | HEART RATE: 91 BPM | TEMPERATURE: 98.1 F

## 2020-01-22 DIAGNOSIS — F41.9 ANXIETY DISORDER, UNSPECIFIED: ICD-10-CM

## 2020-01-22 LAB
BASOPHILS # BLD AUTO: 0 K/UL — SIGNIFICANT CHANGE UP (ref 0–0.2)
BASOPHILS NFR BLD AUTO: 0.5 % — SIGNIFICANT CHANGE UP (ref 0–2)
EOSINOPHIL # BLD AUTO: 0.1 K/UL — SIGNIFICANT CHANGE UP (ref 0–0.5)
EOSINOPHIL NFR BLD AUTO: 1.3 % — SIGNIFICANT CHANGE UP (ref 0–6)
HCT VFR BLD CALC: 41.5 % — SIGNIFICANT CHANGE UP (ref 34.5–45)
HGB BLD-MCNC: 13.8 G/DL — SIGNIFICANT CHANGE UP (ref 11.5–15.5)
LYMPHOCYTES # BLD AUTO: 1.3 K/UL — SIGNIFICANT CHANGE UP (ref 1–3.3)
LYMPHOCYTES # BLD AUTO: 25.2 % — SIGNIFICANT CHANGE UP (ref 13–44)
MCHC RBC-ENTMCNC: 32.1 PG — SIGNIFICANT CHANGE UP (ref 27–34)
MCHC RBC-ENTMCNC: 33.3 G/DL — SIGNIFICANT CHANGE UP (ref 32–36)
MCV RBC AUTO: 96.4 FL — SIGNIFICANT CHANGE UP (ref 80–100)
MONOCYTES # BLD AUTO: 0.1 K/UL — SIGNIFICANT CHANGE UP (ref 0–0.9)
MONOCYTES NFR BLD AUTO: 1.3 % — LOW (ref 2–14)
NEUTROPHILS # BLD AUTO: 3.8 K/UL — SIGNIFICANT CHANGE UP (ref 1.8–7.4)
NEUTROPHILS NFR BLD AUTO: 71.7 % — SIGNIFICANT CHANGE UP (ref 43–77)
PLATELET # BLD AUTO: 256 K/UL — SIGNIFICANT CHANGE UP (ref 150–400)
RBC # BLD: 4.3 M/UL — SIGNIFICANT CHANGE UP (ref 3.8–5.2)
RBC # FLD: 12.1 % — SIGNIFICANT CHANGE UP (ref 10.3–14.5)
WBC # BLD: 5.2 K/UL — SIGNIFICANT CHANGE UP (ref 3.8–10.5)
WBC # FLD AUTO: 5.2 K/UL — SIGNIFICANT CHANGE UP (ref 3.8–10.5)

## 2020-01-22 PROCEDURE — 99214 OFFICE O/P EST MOD 30 MIN: CPT

## 2020-01-24 NOTE — PHYSICAL EXAM
[Fully active, able to carry on all pre-disease performance without restriction] : Status 0 - Fully active, able to carry on all pre-disease performance without restriction [Normal] : affect appropriate [de-identified] : palpable cervical and axillary adenopathy, well healed L neck surgical scar [de-identified] : overweight [de-identified] : cervical and axillary adenopathy [de-identified] : Papular, blanchable, erythematous rash on L shoulder [de-identified] : deferred

## 2020-01-24 NOTE — RESULTS/DATA
[FreeTextEntry1] : \par  Pathology             Final\par \par No Documents Attached\par \par \par \par \par   [facility] Accession Number : [id number]\par \par FABIAN WAKEFIELD                     2\par \par \par \par Hematopathology Report\par \par \par \par \par Final Diagnosis\par _\par 1.  Lymph node, cervical, left:\par - Classical Hodgkin's lymphoma\par \par See note and description.\par \par Diagnostic note:\par The histologic and immunohistochemical findings are diagnostic of\par Classical Hodgkin's Lymphoma.\par \par Microscopic description:\par Histologic sections show a lymph node with completely effaced\par lyubov architecture. The large Hodgkin/Forrest-Eduar (HRS) cells\par and smudge cells are identified in the back ground of extensive\par necrosis as well as mixed inflammatory infiltrate including\par eosinophils, neutrophils and plasma cells.\par \par Immunohistochemical stains for CD30, CD15, PAX5, CD20, CD3, MUM1,\par EBV encoded RNA (ANNALISA) in situ hybridization, CD79a, CD45 stains\par performed on formalin fixed paraffin embedded tissue, block 1A.\par \par Neoplastic cells are:  Hodgkin/Forrest-Eduar (HRS) cells\par Positive:  CD30, CD15, PAX-5 (weak), MUM1\par Negative:  CD45, CD79a, CD20, CD3, ANNALISA\par \par Additional stains in progress an addendum to follow\par \par Flow cytometry analysis:\par Lymphocytes (52% of cells): Heterogeneous population of T-cells\par (with normal CD4 to CD8 ratio), natural killer cells, and\par polytypic B-cells. The lymphocyte immunophenotypic findings show\par no diagnostic abnormalities.\par \par Slide(s) with built in immunohistochemical study control(s)\par associated and negative control with this case has/have been\par verified by the sign out pathologist.\par For slide(s) without built in controls positive control slides\par has/have been reviewed and approved by immunohistochemistry lab\par These immunohistochemical/ in-situ hybridization tests have been\par developed and their performance characteristics determined by\par [facility] / [facility], Department of\par Pathology, Division of Immunopathology, [address] [place] [address]\[place] [address].  It has not been cleared or approved by the\par U.S. Food and Drug Administration.  The FDA has determined that\par such clearance or approval is not necessary.  This test is used\par for clinical purposes.  The laboratory is certified under the\par CLIA-88 as qualified to perform high\par \par \par \par \par \par FABIAN WAKEFIELD                     2\par \par \par \par Hematopathology Report\par \par \par \par \par complexity clinical testing.\par \par Verified by: Blair Rojas M.D.\par (Electronic Signature)\par Reported on: 12/18/19 16:18 EST, [address] [facility],\[place] [address]\par Phone: [phone]   Fax: [phone]\par _________________________________________________________________\par \par Clinical History\par N/A\par \par Specimen(s) Submitted\par 1-Left cervical lymph node\par \par Gross Description\par \par The specimen is received fresh and the specimen container is\par labeled: Left cervical lymph node.  It consists of a 2.5 x 2.0 x\par 1.9 cm a bisected lymph node.  The specimen is further sectioned\par and entirely submitted in four cassettes.  Lymphoma workup is\par performed.\par \par In addition to other data that may appear on the specimen\par container, the label has been inspected to confirm the presence\par of the patient's name and date of birth.\par \par Sis Yu 12/13/2019 18:12\par \par  \par \par  Ordered by: CASA SANTOS       Collected/Examined: [id number] 01:39PM       \par Verified by: CASA SANTOS [id number] 01:41PM       \par  Result Communication: No patient communication needed at this time;\par Stage: Final       \par  Performed at: [facility]       Resulted: [id number] 04:18PM       Last Updated: [id number] 01:41PM       Accession: [id number]

## 2020-01-24 NOTE — REASON FOR VISIT
[Initial Consultation] : an initial consultation for [Spouse] : spouse [Parent] : parent [Family Member] : family member [FreeTextEntry2] : Hodgkins Lymphoma

## 2020-01-24 NOTE — HISTORY OF PRESENT ILLNESS
[Disease:__________________________] : Disease: [unfilled] [0 - No Distress] : Distress Level: 0 [de-identified] : 25 yo female with newly diagnosed Hodgkins lymphoma. Pt states in Nov 2019 she noticed neck adenopathy, associated with pruritus and skin rash. She went to see her pcp who referred her for ENT evaluation. Excisional node biopsy was done which was c/w Hodgkins lymphoma. Pt also had PET/CT done which showed adenopathy in chest, neck and supraclavicular as well as axillary areas. She denies any night sweats but does have some increased warmth. She reports the pruritus prevents her from sleeping. She has a cousin who had a diagnosis of Hodgkins lymphoma at age 27. Pt is here today with her live-in boyfriend, close family friend and mother. She is tearful and concerned about effects of chemotherapy. She has some information about diagnosis and treatment already - based on her own review. She is eager to start treatment.  [de-identified] : II [de-identified] : \par  Pathology             Final\par \par No Documents Attached\par \par \par \par \par   [facility] Accession Number : [id number]\par \par FABIAN WAKEFIELD                     2\par \par \par \par Hematopathology Report\par \par \par \par \par Final Diagnosis\par _\par 1.  Lymph node, cervical, left:\par - Classical Hodgkin's lymphoma\par \par See note and description.\par \par Diagnostic note:\par The histologic and immunohistochemical findings are diagnostic of\par Classical Hodgkin's Lymphoma.\par \par Microscopic description:\par Histologic sections show a lymph node with completely effaced\par lyubov architecture. The large Hodgkin/Forrest-Eduar (HRS) cells\par and smudge cells are identified in the back ground of extensive\par necrosis as well as mixed inflammatory infiltrate including\par eosinophils, neutrophils and plasma cells.\par \par Immunohistochemical stains for CD30, CD15, PAX5, CD20, CD3, MUM1,\par EBV encoded RNA (ANNALISA) in situ hybridization, CD79a, CD45 stains\par performed on formalin fixed paraffin embedded tissue, block 1A.\par \par Neoplastic cells are:  Hodgkin/Forrest-Eduar (HRS) cells\par Positive:  CD30, CD15, PAX-5 (weak), MUM1\par Negative:  CD45, CD79a, CD20, CD3, ANNALISA\par \par Additional stains in progress an addendum to follow\par \par Flow cytometry analysis:\par Lymphocytes (52% of cells): Heterogeneous population of T-cells\par (with normal CD4 to CD8 ratio), natural killer cells, and\par polytypic B-cells. The lymphocyte immunophenotypic findings show\par no diagnostic abnormalities.\par \par Slide(s) with built in immunohistochemical study control(s)\par associated and negative control with this case has/have been\par verified by the sign out pathologist.\par For slide(s) without built in controls positive control slides\par has/have been reviewed and approved by immunohistochemistry lab\par These immunohistochemical/ in-situ hybridization tests have been\par developed and their performance characteristics determined by\par [facility], Department of\par Pathology, Division of Immunopathology, [address] [place] [address]\[place] [address].  It has not been cleared or approved by the\par U.S. Food and Drug Administration.  The FDA has determined that\par such clearance or approval is not necessary.  This test is used\par for clinical purposes.  The laboratory is certified under the\par CLIA-88 as qualified to perform high\par \par \par \par \par \par FABIAN WAKEFIELD                     2\par \par \par \par Hematopathology Report\par \par \par \par \par complexity clinical testing.\par \par Verified by: Blair Rojas M.D.\par (Electronic Signature)\par Reported on: 12/18/19 16:18 EST, [address] [facility],\[place] [address]\par Phone: [phone]   Fax: [phone]\par _________________________________________________________________\par \par Clinical History\par N/A\par \par Specimen(s) Submitted\par 1-Left cervical lymph node\par \par Gross Description\par \par The specimen is received fresh and the specimen container is\par labeled: Left cervical lymph node.  It consists of a 2.5 x 2.0 x\par 1.9 cm a bisected lymph node.  The specimen is further sectioned\par and entirely submitted in four cassettes.  Lymphoma workup is\par performed.\par \par In addition to other data that may appear on the specimen\par container, the label has been inspected to confirm the presence\par of the patient's name and date of birth.\par \par Sis Yu 12/13/2019 18:12\par \par  \par \par  Ordered by: CASA SANTOS       Collected/Examined: [id number] 01:39PM       \par Verified by: CASA SANTOS [id number] 01:41PM       \par  Result Communication: No patient communication needed at this time;\par Stage: Final       \par  Performed at: [facility]       Resulted: [id number] 04:18PM       Last Updated: [id number] 01:41PM       Accession: [id number]        [de-identified] : January 22, 2020\par Patient presents for follow up accompanied by her fiance. Currently on cycle 1 day 6 of ABVD regimen, states she had abdominal pain and had diarrhea post treatment which self resolved, now complains of constipation. Overall states she feels well, pruritus resolved, and improvement in night sweats & fatigue. Good appetite, stable weight. Rash on L shoulder has not improved or worsened since last visit and complains of jaw sensitivity since beginning chemotherapy. Denies headache, fever, chills, back pain, chest pain, SOB, abdominal pain, n/v/d, peripheral edema, or peripheral neuropathy.

## 2020-01-24 NOTE — REVIEW OF SYSTEMS
[Skin Rash] : skin rash [Swollen Glands] : swollen glands [Negative] : Constitutional [de-identified] : L shoulder

## 2020-01-31 ENCOUNTER — RESULT REVIEW (OUTPATIENT)
Age: 25
End: 2020-01-31

## 2020-01-31 ENCOUNTER — LABORATORY RESULT (OUTPATIENT)
Age: 25
End: 2020-01-31

## 2020-01-31 ENCOUNTER — APPOINTMENT (OUTPATIENT)
Dept: INFUSION THERAPY | Facility: HOSPITAL | Age: 25
End: 2020-01-31

## 2020-01-31 LAB
BASOPHILS # BLD AUTO: 0 K/UL — SIGNIFICANT CHANGE UP (ref 0–0.2)
BASOPHILS NFR BLD AUTO: 1 % — SIGNIFICANT CHANGE UP (ref 0–2)
EOSINOPHIL # BLD AUTO: 0.1 K/UL — SIGNIFICANT CHANGE UP (ref 0–0.5)
EOSINOPHIL NFR BLD AUTO: 3 % — SIGNIFICANT CHANGE UP (ref 0–6)
HCT VFR BLD CALC: 37.9 % — SIGNIFICANT CHANGE UP (ref 34.5–45)
HGB BLD-MCNC: 12.9 G/DL — SIGNIFICANT CHANGE UP (ref 11.5–15.5)
LYMPHOCYTES # BLD AUTO: 1.1 K/UL — SIGNIFICANT CHANGE UP (ref 1–3.3)
LYMPHOCYTES # BLD AUTO: 53 % — HIGH (ref 13–44)
MCHC RBC-ENTMCNC: 32.6 PG — SIGNIFICANT CHANGE UP (ref 27–34)
MCHC RBC-ENTMCNC: 34.2 G/DL — SIGNIFICANT CHANGE UP (ref 32–36)
MCV RBC AUTO: 95.3 FL — SIGNIFICANT CHANGE UP (ref 80–100)
MONOCYTES # BLD AUTO: 0.4 K/UL — SIGNIFICANT CHANGE UP (ref 0–0.9)
MONOCYTES NFR BLD AUTO: 12 % — SIGNIFICANT CHANGE UP (ref 2–14)
NEUTROPHILS # BLD AUTO: 0.7 K/UL — LOW (ref 1.8–7.4)
NEUTROPHILS NFR BLD AUTO: 31 % — LOW (ref 43–77)
PLAT MORPH BLD: NORMAL — SIGNIFICANT CHANGE UP
PLATELET # BLD AUTO: 270 K/UL — SIGNIFICANT CHANGE UP (ref 150–400)
RBC # BLD: 3.97 M/UL — SIGNIFICANT CHANGE UP (ref 3.8–5.2)
RBC # FLD: 12.7 % — SIGNIFICANT CHANGE UP (ref 10.3–14.5)
RBC BLD AUTO: SIGNIFICANT CHANGE UP
WBC # BLD: 2.3 K/UL — LOW (ref 3.8–10.5)
WBC # FLD AUTO: 2.3 K/UL — LOW (ref 3.8–10.5)

## 2020-02-03 ENCOUNTER — APPOINTMENT (OUTPATIENT)
Dept: INFUSION THERAPY | Facility: HOSPITAL | Age: 25
End: 2020-02-03

## 2020-02-03 ENCOUNTER — RESULT REVIEW (OUTPATIENT)
Age: 25
End: 2020-02-03

## 2020-02-03 LAB
BASOPHILS # BLD AUTO: 0 K/UL — SIGNIFICANT CHANGE UP (ref 0–0.2)
BASOPHILS NFR BLD AUTO: 1 % — SIGNIFICANT CHANGE UP (ref 0–2)
EOSINOPHIL # BLD AUTO: 0.1 K/UL — SIGNIFICANT CHANGE UP (ref 0–0.5)
EOSINOPHIL NFR BLD AUTO: 2.1 % — SIGNIFICANT CHANGE UP (ref 0–6)
HCT VFR BLD CALC: 41.2 % — SIGNIFICANT CHANGE UP (ref 34.5–45)
HGB BLD-MCNC: 13.8 G/DL — SIGNIFICANT CHANGE UP (ref 11.5–15.5)
LYMPHOCYTES # BLD AUTO: 1.6 K/UL — SIGNIFICANT CHANGE UP (ref 1–3.3)
LYMPHOCYTES # BLD AUTO: 37.3 % — SIGNIFICANT CHANGE UP (ref 13–44)
MCHC RBC-ENTMCNC: 31.7 PG — SIGNIFICANT CHANGE UP (ref 27–34)
MCHC RBC-ENTMCNC: 33.5 G/DL — SIGNIFICANT CHANGE UP (ref 32–36)
MCV RBC AUTO: 94.7 FL — SIGNIFICANT CHANGE UP (ref 80–100)
MONOCYTES # BLD AUTO: 0.6 K/UL — SIGNIFICANT CHANGE UP (ref 0–0.9)
MONOCYTES NFR BLD AUTO: 13.8 % — SIGNIFICANT CHANGE UP (ref 2–14)
NEUTROPHILS # BLD AUTO: 2 K/UL — SIGNIFICANT CHANGE UP (ref 1.8–7.4)
NEUTROPHILS NFR BLD AUTO: 45.8 % — SIGNIFICANT CHANGE UP (ref 43–77)
PLATELET # BLD AUTO: 292 K/UL — SIGNIFICANT CHANGE UP (ref 150–400)
RBC # BLD: 4.35 M/UL — SIGNIFICANT CHANGE UP (ref 3.8–5.2)
RBC # FLD: 13 % — SIGNIFICANT CHANGE UP (ref 10.3–14.5)
WBC # BLD: 4.3 K/UL — SIGNIFICANT CHANGE UP (ref 3.8–10.5)
WBC # FLD AUTO: 4.3 K/UL — SIGNIFICANT CHANGE UP (ref 3.8–10.5)

## 2020-02-06 ENCOUNTER — LABORATORY RESULT (OUTPATIENT)
Age: 25
End: 2020-02-06

## 2020-02-06 ENCOUNTER — RESULT REVIEW (OUTPATIENT)
Age: 25
End: 2020-02-06

## 2020-02-06 ENCOUNTER — APPOINTMENT (OUTPATIENT)
Dept: INFUSION THERAPY | Facility: HOSPITAL | Age: 25
End: 2020-02-06

## 2020-02-06 LAB
BASOPHILS # BLD AUTO: 0.1 K/UL — SIGNIFICANT CHANGE UP (ref 0–0.2)
BASOPHILS NFR BLD AUTO: 1 % — SIGNIFICANT CHANGE UP (ref 0–2)
EOSINOPHIL # BLD AUTO: 0.1 K/UL — SIGNIFICANT CHANGE UP (ref 0–0.5)
EOSINOPHIL NFR BLD AUTO: 1.6 % — SIGNIFICANT CHANGE UP (ref 0–6)
HCT VFR BLD CALC: 41.5 % — SIGNIFICANT CHANGE UP (ref 34.5–45)
HGB BLD-MCNC: 13.9 G/DL — SIGNIFICANT CHANGE UP (ref 11.5–15.5)
LYMPHOCYTES # BLD AUTO: 1.4 K/UL — SIGNIFICANT CHANGE UP (ref 1–3.3)
LYMPHOCYTES # BLD AUTO: 24.2 % — SIGNIFICANT CHANGE UP (ref 13–44)
MCHC RBC-ENTMCNC: 31.7 PG — SIGNIFICANT CHANGE UP (ref 27–34)
MCHC RBC-ENTMCNC: 33.5 G/DL — SIGNIFICANT CHANGE UP (ref 32–36)
MCV RBC AUTO: 94.8 FL — SIGNIFICANT CHANGE UP (ref 80–100)
MONOCYTES # BLD AUTO: 0.6 K/UL — SIGNIFICANT CHANGE UP (ref 0–0.9)
MONOCYTES NFR BLD AUTO: 9.4 % — SIGNIFICANT CHANGE UP (ref 2–14)
NEUTROPHILS # BLD AUTO: 3.8 K/UL — SIGNIFICANT CHANGE UP (ref 1.8–7.4)
NEUTROPHILS NFR BLD AUTO: 63.8 % — SIGNIFICANT CHANGE UP (ref 43–77)
PLATELET # BLD AUTO: 279 K/UL — SIGNIFICANT CHANGE UP (ref 150–400)
RBC # BLD: 4.38 M/UL — SIGNIFICANT CHANGE UP (ref 3.8–5.2)
RBC # FLD: 12.8 % — SIGNIFICANT CHANGE UP (ref 10.3–14.5)
WBC # BLD: 6 K/UL — SIGNIFICANT CHANGE UP (ref 3.8–10.5)
WBC # FLD AUTO: 6 K/UL — SIGNIFICANT CHANGE UP (ref 3.8–10.5)

## 2020-02-07 DIAGNOSIS — Z51.89 ENCOUNTER FOR OTHER SPECIFIED AFTERCARE: ICD-10-CM

## 2020-02-10 ENCOUNTER — OUTPATIENT (OUTPATIENT)
Dept: OUTPATIENT SERVICES | Facility: HOSPITAL | Age: 25
LOS: 1 days | Discharge: HOME | End: 2020-02-10

## 2020-02-10 DIAGNOSIS — C81.70 OTHER HODGKIN LYMPHOMA, UNSPECIFIED SITE: ICD-10-CM

## 2020-02-13 ENCOUNTER — RESULT REVIEW (OUTPATIENT)
Age: 25
End: 2020-02-13

## 2020-02-13 ENCOUNTER — APPOINTMENT (OUTPATIENT)
Dept: HEMATOLOGY ONCOLOGY | Facility: CLINIC | Age: 25
End: 2020-02-13
Payer: COMMERCIAL

## 2020-02-13 VITALS
OXYGEN SATURATION: 99 % | DIASTOLIC BLOOD PRESSURE: 79 MMHG | HEART RATE: 81 BPM | RESPIRATION RATE: 16 BRPM | BODY MASS INDEX: 34.83 KG/M2 | SYSTOLIC BLOOD PRESSURE: 114 MMHG | WEIGHT: 242.71 LBS | TEMPERATURE: 98.5 F

## 2020-02-13 LAB
BASOPHILS # BLD AUTO: 0.1 K/UL — SIGNIFICANT CHANGE UP (ref 0–0.2)
EOSINOPHIL # BLD AUTO: 0.1 K/UL — SIGNIFICANT CHANGE UP (ref 0–0.5)
EOSINOPHIL NFR BLD AUTO: 1 % — SIGNIFICANT CHANGE UP (ref 0–6)
HCT VFR BLD CALC: 40.3 % — SIGNIFICANT CHANGE UP (ref 34.5–45)
HGB BLD-MCNC: 13.9 G/DL — SIGNIFICANT CHANGE UP (ref 11.5–15.5)
LYMPHOCYTES # BLD AUTO: 16 % — SIGNIFICANT CHANGE UP (ref 13–44)
LYMPHOCYTES # BLD AUTO: 2.8 K/UL — SIGNIFICANT CHANGE UP (ref 1–3.3)
MCHC RBC-ENTMCNC: 32.6 PG — SIGNIFICANT CHANGE UP (ref 27–34)
MCHC RBC-ENTMCNC: 34.4 G/DL — SIGNIFICANT CHANGE UP (ref 32–36)
MCV RBC AUTO: 94.9 FL — SIGNIFICANT CHANGE UP (ref 80–100)
MONOCYTES # BLD AUTO: 0.8 K/UL — SIGNIFICANT CHANGE UP (ref 0–0.9)
MONOCYTES NFR BLD AUTO: 6 % — SIGNIFICANT CHANGE UP (ref 2–14)
NEUTROPHILS # BLD AUTO: 19.7 K/UL — HIGH (ref 1.8–7.4)
NEUTROPHILS NFR BLD AUTO: 75 % — SIGNIFICANT CHANGE UP (ref 43–77)
NEUTS BAND # BLD: 2 % — SIGNIFICANT CHANGE UP (ref 0–8)
PLAT MORPH BLD: NORMAL — SIGNIFICANT CHANGE UP
PLATELET # BLD AUTO: 298 K/UL — SIGNIFICANT CHANGE UP (ref 150–400)
RBC # BLD: 4.25 M/UL — SIGNIFICANT CHANGE UP (ref 3.8–5.2)
RBC # FLD: 12.7 % — SIGNIFICANT CHANGE UP (ref 10.3–14.5)
RBC BLD AUTO: SIGNIFICANT CHANGE UP
WBC # BLD: 23.4 K/UL — HIGH (ref 3.8–10.5)
WBC # FLD AUTO: 23.4 K/UL — HIGH (ref 3.8–10.5)

## 2020-02-13 PROCEDURE — 99215 OFFICE O/P EST HI 40 MIN: CPT

## 2020-02-16 ENCOUNTER — TRANSCRIPTION ENCOUNTER (OUTPATIENT)
Age: 25
End: 2020-02-16

## 2020-02-17 NOTE — RESULTS/DATA
[FreeTextEntry1] : \par  Pathology             Final\par \par No Documents Attached\par \par \par \par \par   [facility] Accession Number : [id number]\par \par FABIAN WAKEFIELD                     3\par \par \par \par Hematopathology Report\par \par \par \par \par Final Diagnosis\par 1, 2. Bone marrow biopsy and bone marrow aspirate\par - Normocellular marrow with maturing trilineage\par hematopoiesis\par - No morphologic or immunophenotypic evidence supporting\par involvement by lymphoma\par See note and description.\par \par Diagnostic note: Per chart review, patient has a recent diagnosis\par of Hodgkin Lymphoma. Current biopsy shows normocellular marrow\par with maturing trilineage hematopoiesis. There is no morphologic\par or immunophenotypic evidence supporting involvement by lymphoma\par Comprehensive report with results of pending ancillary studies to\par follow.\par \par Ancillary studies\par Special stains:\par Bone marrow aspirate iron stain: Iron stores are present. There\par is no increase in ring\par sideroblasts\par \par Flow cytometry (10-FL-):  Lymphocytes (3% of cells):\par Heterogeneous population of T-cells (with normal CD4 to CD8\par ratio), natural killer cells, and polytypic B-cells.\par \par Cytogenetics:  In process, will be reported as an addendum\par \par Microscopic description:\par 1. Biopsy:\par Quality:   Adequate\par Cellularity:    60-70%\par Myeloid lineage:     Normal in number, exhibits full maturation\par Erythroid lineage:  Normal in number, exhibits full maturation\par M: E Within normal limits\par Megakaryocytes:     Normal in number, unremarkable morphology\par Blasts:   Not increased\par Lymphocytes    Not increased in number, mostly small mature\par looking lymphocytes are seen\par Plasma cells:  Not increased\par Stroma:   Unremarkable, iron stores are present\par Clot:     Marrow fragments with similar morphology as the core\par biopsy\par \par 2. Aspirate:\par Quality / cellularity:    Spicular and cellular aspirates\par Granulocytes:  Normal in number, exhibit full maturation, no\par significant dysplastic changes seen\par \par \par \par \par \par \par FABIAN WAKEFIELD                     3\par \par \par \par Hematopathology Report\par \par \par \par \par Erythrocytes:        Normal in number, exhibit full maturation,\par no significant dysplastic\par changes seen\par M: E ratio:          Within normal limits\par Megakaryocytes:      Normal in number, unremarkable morphology\par Lymphocytes:   Not increased\par Plasma cells:        Not increased\par Blasts:         Not increased\par \par Bone Marrow Aspirate Smear Differential Cell Count:  400 Cells.\par Type            %    Normal*   Type           %    Normal*\par Blast                0%   0-3\par Promyelocyte         6%   1-8       Pronormoblast  3%   0-2\par Myelocyte       9%   10-15          Normoblast     17%  15-25\par Metamyelocyte   9%   10-15          Monocyte  3%   0-2\par Band/Neutrophil      30%  12-25          Lymphocyte     19%  10-\par 15\par Eosinophil           3%   1-5       Plasma cell    0%   0-1\par Basophil        1%   0-1\par *Adult Range\par \par Verified by: Hua Chaney MD\par (Electronic Signature)\par Reported on: 12/27/19 10:22 EST, [address] [facility],\[place] [address]\par Phone: [phone]   Fax: [phone]\par _________________________________________________________________\par \par Clinical History\par Cervical lymphadenopathy suspected Hodgkin's lymphoma\par \par Specimen(s) Submitted\par 1     Bone marrow biopsy\par 2     Bone marrow aspirate\par \par Gross Description\par 1. The specimen is received in bouin's fixative and the specimen\par container is labeled: Bone marrow.  It consists of a 1.4 x 0.2 cm\par bone marrow core with a 1.5 x 1.3 x 0.2 cm aggregate of blood\par clot.  Entirely submitted.  Two cassettes: A = bone marrow core;\par B = blood clot.\par \par 2. Two Lyle-Giemsa stained bone marrow aspirate smears are\par submitted\par \par In addition to other data that may appear on the specimen\par container, the label has been inspected to confirm the presence\par of the patient's name and date of birth.\par \par \par \par \par \par ASHU FABIAN                     3\par \par \par \par Hematopathology Report\par \par \par \par \par \par KAMRON [facility]amanca [facility] 12/24/2019 18:33\par \par  \par \par  Ordered by: RUKHSANA MARIN       Collected/Examined: 24Dec2019 12:00PM       \par Verified by: RUKHSANA MARIN [id number] 11:24AM       \par  Result Communication: No patient communication needed at this time;\par Stage: Final       \par  Performed at: [facility]       Resulted: 27Dec2019 10:22AM       Last Updated: 27Dec2019 11:24AM       Accession: [id number]       \par  Pathology             Final\par \par No Documents Attached\par \par \par \par \par   [facility] Accession Number : [id number]\par \par WAKEFIELDFABIAN PURDY                     2\par \par \par \par Hematopathology Report\par \par \par \par \par Final Diagnosis\par _\par 1.  Lymph node, cervical, left:\par - Classical Hodgkin's lymphoma\par \par See note and description.\par \par Diagnostic note:\par The histologic and immunohistochemical findings are diagnostic of\par Classical Hodgkin's Lymphoma.\par \par Microscopic description:\par Histologic sections show a lymph node with completely effaced\par lyubov architecture. The large Hodgkin/Forrest-Eduar (HRS) cells\par and smudge cells are identified in the back ground of extensive\par necrosis as well as mixed inflammatory infiltrate including\par eosinophils, neutrophils and plasma cells.\par \par Immunohistochemical stains for CD30, CD15, PAX5, CD20, CD3, MUM1,\par EBV encoded RNA (ANNALISA) in situ hybridization, CD79a, CD45 stains\par performed on formalin fixed paraffin embedded tissue, block 1A.\par \par Neoplastic cells are:  Hodgkin/Forrest-Eduar (HRS) cells\par Positive:  CD30, CD15, PAX-5 (weak), MUM1\par Negative:  CD45, CD79a, CD20, CD3, ANNALISA\par \par Additional stains in progress an addendum to follow\par \par Flow cytometry analysis:\par Lymphocytes (52% of cells): Heterogeneous population of T-cells\par (with normal CD4 to CD8 ratio), natural killer cells, and\par polytypic B-cells. The lymphocyte immunophenotypic findings show\par no diagnostic abnormalities.\par \par Slide(s) with built in immunohistochemical study control(s)\par associated and negative control with this case has/have been\par verified by the sign out pathologist.\par For slide(s) without built in controls positive control slides\par has/have been reviewed and approved by immunohistochemistry lab\par These immunohistochemical/ in-situ hybridization tests have been\par developed and their performance characteristics determined by\par [facility], Department of\par Pathology, Division of Immunopathology, [address] [place] [address]\[place] [address].  It has not been cleared or approved by the\par U.S. Food and Drug Administration.  The FDA has determined that\par such clearance or approval is not necessary.  This test is used\par for clinical purposes.  The laboratory is certified under the\par CLIA-88 as qualified to perform high\par \par \par \par \par \par ASHU FABIAN                     2\par \par \par \par Hematopathology Report\par \par \par \par \par complexity clinical testing.\par \par Verified by: Blair Rojas M.D.\par (Electronic Signature)\par Reported on: 12/18/19 16:18 EST, [address] [facility],\[place] [address]\par Phone: [phone]   Fax: [phone]\par _________________________________________________________________\par \par Clinical History\par N/A\par \par Specimen(s) Submitted\par 1-Left cervical lymph node\par \par Gross Description\par \par The specimen is received fresh and the specimen container is\par labeled: Left cervical lymph node.  It consists of a 2.5 x 2.0 x\par 1.9 cm a bisected lymph node.  The specimen is further sectioned\par and entirely submitted in four cassettes.  Lymphoma workup is\par performed.\par \par In addition to other data that may appear on the specimen\par container, the label has been inspected to confirm the presence\par of the patient's name and date of birth.\par \par Sis Yu 12/13/2019 18:12\par \par  \par \par  Ordered by: CASA SANTOS       Collected/Examined: [id number] 01:39PM       \par Verified by: CASA SANTOS [id number] 01:41PM       \par  Result Communication: No patient communication needed at this time;\par Stage: Final       \par  Performed at: [facility]       Resulted: [id number] 04:18PM       Last Updated: [id number] 01:41PM       Accession: [id number]

## 2020-02-17 NOTE — HISTORY OF PRESENT ILLNESS
[Disease:__________________________] : Disease: [unfilled] [Cycle: ___] : Cycle: [unfilled] [Treatment Protocol] : Treatment Protocol [de-identified] : Dec 24, 2019\par 25 yo female with newly diagnosed Hodgkins lymphoma. Pt states in Nov 2019 she noticed neck adenopathy, associated with pruritus and skin rash. She went to see her pcp who referred her for ENT evaluation. Excisional node biopsy was done which was c/w Hodgkins lymphoma. Pt also had PET/CT done which showed adenopathy in chest, neck and supraclavicular as well as axillary areas. She denies any night sweats but does have some increased warmth. She reports the pruritus prevents her from sleeping. She has a cousin who had a diagnosis of Hodgkins lymphoma at age 27. Pt is here today with her live-in boyfriend, close family friend and mother. She is tearful and concerned about effects of chemotherapy. She has some information about diagnosis and treatment already - based on her own review. She is eager to start treatment.  [de-identified] : II [de-identified] : \par  Pathology             Final\par \par No Documents Attached\par \par \par \par \par   [facility] Accession Number : [id number]\par \par FABIAN WAKEFIELD                     2\par \par \par \par Hematopathology Report\par \par \par \par \par Final Diagnosis\par _\par 1.  Lymph node, cervical, left:\par - Classical Hodgkin's lymphoma\par \par See note and description.\par \par Diagnostic note:\par The histologic and immunohistochemical findings are diagnostic of\par Classical Hodgkin's Lymphoma.\par \par Microscopic description:\par Histologic sections show a lymph node with completely effaced\par lyubov architecture. The large Hodgkin/Forrest-Eduar (HRS) cells\par and smudge cells are identified in the back ground of extensive\par necrosis as well as mixed inflammatory infiltrate including\par eosinophils, neutrophils and plasma cells.\par \par Immunohistochemical stains for CD30, CD15, PAX5, CD20, CD3, MUM1,\par EBV encoded RNA (ANNALISA) in situ hybridization, CD79a, CD45 stains\par performed on formalin fixed paraffin embedded tissue, block 1A.\par \par Neoplastic cells are:  Hodgkin/Forrest-Eduar (HRS) cells\par Positive:  CD30, CD15, PAX-5 (weak), MUM1\par Negative:  CD45, CD79a, CD20, CD3, ANNALISA\par \par Additional stains in progress an addendum to follow\par \par Flow cytometry analysis:\par Lymphocytes (52% of cells): Heterogeneous population of T-cells\par (with normal CD4 to CD8 ratio), natural killer cells, and\par polytypic B-cells. The lymphocyte immunophenotypic findings show\par no diagnostic abnormalities.\par \par Slide(s) with built in immunohistochemical study control(s)\par associated and negative control with this case has/have been\par verified by the sign out pathologist.\par For slide(s) without built in controls positive control slides\par has/have been reviewed and approved by immunohistochemistry lab\par These immunohistochemical/ in-situ hybridization tests have been\par developed and their performance characteristics determined by\par [facility], Department of\par Pathology, Division of Immunopathology, [address] [place] [address]\[place] [address].  It has not been cleared or approved by the\par U.S. Food and Drug Administration.  The FDA has determined that\par such clearance or approval is not necessary.  This test is used\par for clinical purposes.  The laboratory is certified under the\par CLIA-88 as qualified to perform high\par \par \par \par \par \par FABIAN WAKEFIELD                     2\par \par \par \par Hematopathology Report\par \par \par \par \par complexity clinical testing.\par \par Verified by: Blair Rojas M.D.\par (Electronic Signature)\par Reported on: 12/18/19 16:18 EST, [address] [facility],\[place] [address]\par Phone: [phone]   Fax: [phone]\par _________________________________________________________________\par \par Clinical History\par N/A\par \par Specimen(s) Submitted\par 1-Left cervical lymph node\par \par Gross Description\par \par The specimen is received fresh and the specimen container is\par labeled: Left cervical lymph node.  It consists of a 2.5 x 2.0 x\par 1.9 cm a bisected lymph node.  The specimen is further sectioned\par and entirely submitted in four cassettes.  Lymphoma workup is\par performed.\par \par In addition to other data that may appear on the specimen\par container, the label has been inspected to confirm the presence\par of the patient's name and date of birth.\par \par Sis Yu 12/13/2019 18:12\par \par  \par \par  Ordered by: CASA SANTOS       Collected/Examined: [id number] 01:39PM       \par Verified by: CASA SANTOS [id number] 01:41PM       \par  Result Communication: No patient communication needed at this time;\par Stage: Final       \par  Performed at: [facility]       Resulted: [id number] 04:18PM       Last Updated: [id number] 01:41PM       Accession: [id number]        [FreeTextEntry1] : ABVD regimen [de-identified] : Pt is here with her boyfriend. She has tolerated cycle 1 of chemo well. Main complaints have been acute nausea and diaphoresis within 24hr of chemo. She reports the initial episode was significant, however since then it has been less intense. She has had a 1 week delay with dose 2 of cycle 1 due to neutropenia, and has now started on neulasta post chemo. She has had some bone pain with the neulasta and is asking whether this medication will be required with each chemo. Otherwise she is doing well.

## 2020-02-17 NOTE — PHYSICAL EXAM
[Restricted in physically strenuous activity but ambulatory and able to carry out work of a light or sedentary nature] : Status 1- Restricted in physically strenuous activity but ambulatory and able to carry out work of a light or sedentary nature, e.g., light house work, office work [Normal] : affect appropriate [de-identified] : overweight [de-identified] :  well healed L neck surgical scar [de-identified] : deferred [de-identified] : markedly improved cervical and axillary adenopathy

## 2020-02-21 ENCOUNTER — RESULT REVIEW (OUTPATIENT)
Age: 25
End: 2020-02-21

## 2020-02-21 ENCOUNTER — LABORATORY RESULT (OUTPATIENT)
Age: 25
End: 2020-02-21

## 2020-02-21 ENCOUNTER — APPOINTMENT (OUTPATIENT)
Dept: INFUSION THERAPY | Facility: HOSPITAL | Age: 25
End: 2020-02-21

## 2020-02-21 LAB
BASOPHILS # BLD AUTO: 0 K/UL — SIGNIFICANT CHANGE UP (ref 0–0.2)
BASOPHILS NFR BLD AUTO: 0.5 % — SIGNIFICANT CHANGE UP (ref 0–2)
EOSINOPHIL # BLD AUTO: 0 K/UL — SIGNIFICANT CHANGE UP (ref 0–0.5)
EOSINOPHIL NFR BLD AUTO: 0 % — SIGNIFICANT CHANGE UP (ref 0–6)
HCT VFR BLD CALC: 36.7 % — SIGNIFICANT CHANGE UP (ref 34.5–45)
HGB BLD-MCNC: 12.6 G/DL — SIGNIFICANT CHANGE UP (ref 11.5–15.5)
LYMPHOCYTES # BLD AUTO: 2 K/UL — SIGNIFICANT CHANGE UP (ref 1–3.3)
LYMPHOCYTES # BLD AUTO: 22 % — SIGNIFICANT CHANGE UP (ref 13–44)
MCHC RBC-ENTMCNC: 32.5 PG — SIGNIFICANT CHANGE UP (ref 27–34)
MCHC RBC-ENTMCNC: 34.3 G/DL — SIGNIFICANT CHANGE UP (ref 32–36)
MCV RBC AUTO: 94.7 FL — SIGNIFICANT CHANGE UP (ref 80–100)
MONOCYTES # BLD AUTO: 0.5 K/UL — SIGNIFICANT CHANGE UP (ref 0–0.9)
MONOCYTES NFR BLD AUTO: 5.6 % — SIGNIFICANT CHANGE UP (ref 2–14)
NEUTROPHILS # BLD AUTO: 6.6 K/UL — SIGNIFICANT CHANGE UP (ref 1.8–7.4)
NEUTROPHILS NFR BLD AUTO: 71.9 % — SIGNIFICANT CHANGE UP (ref 43–77)
PLATELET # BLD AUTO: 206 K/UL — SIGNIFICANT CHANGE UP (ref 150–400)
RBC # BLD: 3.87 M/UL — SIGNIFICANT CHANGE UP (ref 3.8–5.2)
RBC # FLD: 13.2 % — SIGNIFICANT CHANGE UP (ref 10.3–14.5)
WBC # BLD: 9.1 K/UL — SIGNIFICANT CHANGE UP (ref 3.8–10.5)
WBC # FLD AUTO: 9.1 K/UL — SIGNIFICANT CHANGE UP (ref 3.8–10.5)

## 2020-02-24 ENCOUNTER — APPOINTMENT (OUTPATIENT)
Dept: HEMATOLOGY ONCOLOGY | Facility: CLINIC | Age: 25
End: 2020-02-24

## 2020-02-24 DIAGNOSIS — Z51.11 ENCOUNTER FOR ANTINEOPLASTIC CHEMOTHERAPY: ICD-10-CM

## 2020-02-24 DIAGNOSIS — R11.2 NAUSEA WITH VOMITING, UNSPECIFIED: ICD-10-CM

## 2020-02-24 DIAGNOSIS — C81.90 HODGKIN LYMPHOMA, UNSPECIFIED, UNSPECIFIED SITE: ICD-10-CM

## 2020-03-06 ENCOUNTER — APPOINTMENT (OUTPATIENT)
Dept: INFUSION THERAPY | Facility: HOSPITAL | Age: 25
End: 2020-03-06

## 2020-03-06 ENCOUNTER — RESULT REVIEW (OUTPATIENT)
Age: 25
End: 2020-03-06

## 2020-03-06 ENCOUNTER — LABORATORY RESULT (OUTPATIENT)
Age: 25
End: 2020-03-06

## 2020-03-06 LAB
BASOPHILS # BLD AUTO: 0.06 K/UL — SIGNIFICANT CHANGE UP (ref 0–0.2)
BASOPHILS NFR BLD AUTO: 0.4 % — SIGNIFICANT CHANGE UP (ref 0–2)
EOSINOPHIL # BLD AUTO: 0.07 K/UL — SIGNIFICANT CHANGE UP (ref 0–0.5)
EOSINOPHIL NFR BLD AUTO: 0.5 % — SIGNIFICANT CHANGE UP (ref 0–6)
HCT VFR BLD CALC: 37.1 % — SIGNIFICANT CHANGE UP (ref 34.5–45)
HGB BLD-MCNC: 12.9 G/DL — SIGNIFICANT CHANGE UP (ref 11.5–15.5)
IMM GRANULOCYTES NFR BLD AUTO: 1.6 % — HIGH (ref 0–1.5)
LYMPHOCYTES # BLD AUTO: 1.75 K/UL — SIGNIFICANT CHANGE UP (ref 1–3.3)
LYMPHOCYTES # BLD AUTO: 12.9 % — LOW (ref 13–44)
MCHC RBC-ENTMCNC: 32.3 PG — SIGNIFICANT CHANGE UP (ref 27–34)
MCHC RBC-ENTMCNC: 34.8 GM/DL — SIGNIFICANT CHANGE UP (ref 32–36)
MCV RBC AUTO: 93 FL — SIGNIFICANT CHANGE UP (ref 80–100)
MONOCYTES # BLD AUTO: 0.59 K/UL — SIGNIFICANT CHANGE UP (ref 0–0.9)
MONOCYTES NFR BLD AUTO: 4.4 % — SIGNIFICANT CHANGE UP (ref 2–14)
NEUTROPHILS # BLD AUTO: 10.85 K/UL — HIGH (ref 1.8–7.4)
NEUTROPHILS NFR BLD AUTO: 80.2 % — HIGH (ref 43–77)
NRBC # BLD: 0 /100 WBCS — SIGNIFICANT CHANGE UP (ref 0–0)
PLATELET # BLD AUTO: 226 K/UL — SIGNIFICANT CHANGE UP (ref 150–400)
RBC # BLD: 3.99 M/UL — SIGNIFICANT CHANGE UP (ref 3.8–5.2)
RBC # FLD: 14.6 % — HIGH (ref 10.3–14.5)
WBC # BLD: 13.54 K/UL — HIGH (ref 3.8–10.5)
WBC # FLD AUTO: 13.54 K/UL — HIGH (ref 3.8–10.5)

## 2020-03-09 DIAGNOSIS — Z51.89 ENCOUNTER FOR OTHER SPECIFIED AFTERCARE: ICD-10-CM

## 2020-03-12 ENCOUNTER — FORM ENCOUNTER (OUTPATIENT)
Age: 25
End: 2020-03-12

## 2020-03-13 ENCOUNTER — APPOINTMENT (OUTPATIENT)
Dept: NUCLEAR MEDICINE | Facility: IMAGING CENTER | Age: 25
End: 2020-03-13

## 2020-03-13 ENCOUNTER — OUTPATIENT (OUTPATIENT)
Dept: OUTPATIENT SERVICES | Facility: HOSPITAL | Age: 25
LOS: 1 days | End: 2020-03-13
Payer: COMMERCIAL

## 2020-03-13 DIAGNOSIS — C81.90 HODGKIN LYMPHOMA, UNSPECIFIED, UNSPECIFIED SITE: ICD-10-CM

## 2020-03-13 PROCEDURE — 78815 PET IMAGE W/CT SKULL-THIGH: CPT

## 2020-03-13 PROCEDURE — A9552: CPT

## 2020-03-13 PROCEDURE — 78815 PET IMAGE W/CT SKULL-THIGH: CPT | Mod: 26,PS

## 2020-03-14 ENCOUNTER — OUTPATIENT (OUTPATIENT)
Dept: OUTPATIENT SERVICES | Facility: HOSPITAL | Age: 25
LOS: 1 days | Discharge: HOME | End: 2020-03-14

## 2020-03-14 DIAGNOSIS — C81.70 OTHER HODGKIN LYMPHOMA, UNSPECIFIED SITE: ICD-10-CM

## 2020-03-16 ENCOUNTER — TRANSCRIPTION ENCOUNTER (OUTPATIENT)
Age: 25
End: 2020-03-16

## 2020-03-19 ENCOUNTER — APPOINTMENT (OUTPATIENT)
Dept: HEMATOLOGY ONCOLOGY | Facility: CLINIC | Age: 25
End: 2020-03-19

## 2020-03-20 ENCOUNTER — RESULT REVIEW (OUTPATIENT)
Age: 25
End: 2020-03-20

## 2020-03-20 ENCOUNTER — LABORATORY RESULT (OUTPATIENT)
Age: 25
End: 2020-03-20

## 2020-03-20 ENCOUNTER — APPOINTMENT (OUTPATIENT)
Dept: INFUSION THERAPY | Facility: HOSPITAL | Age: 25
End: 2020-03-20

## 2020-03-20 LAB
BASOPHILS # BLD AUTO: 0.06 K/UL — SIGNIFICANT CHANGE UP (ref 0–0.2)
BASOPHILS NFR BLD AUTO: 0.5 % — SIGNIFICANT CHANGE UP (ref 0–2)
EOSINOPHIL # BLD AUTO: 0.09 K/UL — SIGNIFICANT CHANGE UP (ref 0–0.5)
EOSINOPHIL NFR BLD AUTO: 0.7 % — SIGNIFICANT CHANGE UP (ref 0–6)
HCT VFR BLD CALC: 38.8 % — SIGNIFICANT CHANGE UP (ref 34.5–45)
HGB BLD-MCNC: 13.1 G/DL — SIGNIFICANT CHANGE UP (ref 11.5–15.5)
IMM GRANULOCYTES NFR BLD AUTO: 1.9 % — HIGH (ref 0–1.5)
LYMPHOCYTES # BLD AUTO: 1.82 K/UL — SIGNIFICANT CHANGE UP (ref 1–3.3)
LYMPHOCYTES # BLD AUTO: 14.8 % — SIGNIFICANT CHANGE UP (ref 13–44)
MCHC RBC-ENTMCNC: 32 PG — SIGNIFICANT CHANGE UP (ref 27–34)
MCHC RBC-ENTMCNC: 33.8 GM/DL — SIGNIFICANT CHANGE UP (ref 32–36)
MCV RBC AUTO: 94.9 FL — SIGNIFICANT CHANGE UP (ref 80–100)
MONOCYTES # BLD AUTO: 0.92 K/UL — HIGH (ref 0–0.9)
MONOCYTES NFR BLD AUTO: 7.5 % — SIGNIFICANT CHANGE UP (ref 2–14)
NEUTROPHILS # BLD AUTO: 9.14 K/UL — HIGH (ref 1.8–7.4)
NEUTROPHILS NFR BLD AUTO: 74.6 % — SIGNIFICANT CHANGE UP (ref 43–77)
NRBC # BLD: 0 /100 WBCS — SIGNIFICANT CHANGE UP (ref 0–0)
PLATELET # BLD AUTO: 228 K/UL — SIGNIFICANT CHANGE UP (ref 150–400)
RBC # BLD: 4.09 M/UL — SIGNIFICANT CHANGE UP (ref 3.8–5.2)
RBC # FLD: 14.6 % — HIGH (ref 10.3–14.5)
WBC # BLD: 12.26 K/UL — HIGH (ref 3.8–10.5)
WBC # FLD AUTO: 12.26 K/UL — HIGH (ref 3.8–10.5)

## 2020-03-20 RX ORDER — NORGESTIMATE AND ETHINYL ESTRADIOL 7DAYSX3 LO
1 KIT ORAL
Qty: 0 | Refills: 0 | DISCHARGE

## 2020-03-20 RX ORDER — DIPHENHYDRAMINE HCL 50 MG
1 CAPSULE ORAL
Qty: 0 | Refills: 0 | DISCHARGE

## 2020-03-23 DIAGNOSIS — R11.2 NAUSEA WITH VOMITING, UNSPECIFIED: ICD-10-CM

## 2020-03-23 DIAGNOSIS — Z51.89 ENCOUNTER FOR OTHER SPECIFIED AFTERCARE: ICD-10-CM

## 2020-03-23 DIAGNOSIS — C81.90 HODGKIN LYMPHOMA, UNSPECIFIED, UNSPECIFIED SITE: ICD-10-CM

## 2020-04-03 ENCOUNTER — RESULT REVIEW (OUTPATIENT)
Age: 25
End: 2020-04-03

## 2020-04-03 ENCOUNTER — APPOINTMENT (OUTPATIENT)
Dept: INFUSION THERAPY | Facility: HOSPITAL | Age: 25
End: 2020-04-03

## 2020-04-03 ENCOUNTER — LABORATORY RESULT (OUTPATIENT)
Age: 25
End: 2020-04-03

## 2020-04-03 LAB
BASOPHILS # BLD AUTO: 0.03 K/UL — SIGNIFICANT CHANGE UP (ref 0–0.2)
BASOPHILS NFR BLD AUTO: 0.3 % — SIGNIFICANT CHANGE UP (ref 0–2)
EOSINOPHIL # BLD AUTO: 0.08 K/UL — SIGNIFICANT CHANGE UP (ref 0–0.5)
EOSINOPHIL NFR BLD AUTO: 0.7 % — SIGNIFICANT CHANGE UP (ref 0–6)
HCT VFR BLD CALC: 36.1 % — SIGNIFICANT CHANGE UP (ref 34.5–45)
HGB BLD-MCNC: 12.1 G/DL — SIGNIFICANT CHANGE UP (ref 11.5–15.5)
IMM GRANULOCYTES NFR BLD AUTO: 1.7 % — HIGH (ref 0–1.5)
LYMPHOCYTES # BLD AUTO: 1.56 K/UL — SIGNIFICANT CHANGE UP (ref 1–3.3)
LYMPHOCYTES # BLD AUTO: 13.9 % — SIGNIFICANT CHANGE UP (ref 13–44)
MCHC RBC-ENTMCNC: 32.4 PG — SIGNIFICANT CHANGE UP (ref 27–34)
MCHC RBC-ENTMCNC: 33.5 GM/DL — SIGNIFICANT CHANGE UP (ref 32–36)
MCV RBC AUTO: 96.5 FL — SIGNIFICANT CHANGE UP (ref 80–100)
MONOCYTES # BLD AUTO: 0.71 K/UL — SIGNIFICANT CHANGE UP (ref 0–0.9)
MONOCYTES NFR BLD AUTO: 6.3 % — SIGNIFICANT CHANGE UP (ref 2–14)
NEUTROPHILS # BLD AUTO: 8.64 K/UL — HIGH (ref 1.8–7.4)
NEUTROPHILS NFR BLD AUTO: 77.1 % — HIGH (ref 43–77)
NRBC # BLD: 0 /100 WBCS — SIGNIFICANT CHANGE UP (ref 0–0)
PLATELET # BLD AUTO: 236 K/UL — SIGNIFICANT CHANGE UP (ref 150–400)
RBC # BLD: 3.74 M/UL — LOW (ref 3.8–5.2)
RBC # FLD: 15 % — HIGH (ref 10.3–14.5)
WBC # BLD: 11.21 K/UL — HIGH (ref 3.8–10.5)
WBC # FLD AUTO: 11.21 K/UL — HIGH (ref 3.8–10.5)

## 2020-04-10 ENCOUNTER — OUTPATIENT (OUTPATIENT)
Dept: OUTPATIENT SERVICES | Facility: HOSPITAL | Age: 25
LOS: 1 days | Discharge: HOME | End: 2020-04-10

## 2020-04-10 DIAGNOSIS — C81.70 OTHER HODGKIN LYMPHOMA, UNSPECIFIED SITE: ICD-10-CM

## 2020-04-16 ENCOUNTER — APPOINTMENT (OUTPATIENT)
Dept: HEMATOLOGY ONCOLOGY | Facility: CLINIC | Age: 25
End: 2020-04-16
Payer: COMMERCIAL

## 2020-04-16 PROCEDURE — 99203 OFFICE O/P NEW LOW 30 MIN: CPT | Mod: 95

## 2020-04-16 PROCEDURE — 99213 OFFICE O/P EST LOW 20 MIN: CPT | Mod: 95

## 2020-04-17 ENCOUNTER — APPOINTMENT (OUTPATIENT)
Dept: INFUSION THERAPY | Facility: HOSPITAL | Age: 25
End: 2020-04-17

## 2020-04-17 ENCOUNTER — RESULT REVIEW (OUTPATIENT)
Age: 25
End: 2020-04-17

## 2020-04-17 ENCOUNTER — LABORATORY RESULT (OUTPATIENT)
Age: 25
End: 2020-04-17

## 2020-04-17 LAB
BASOPHILS # BLD AUTO: 0.04 K/UL — SIGNIFICANT CHANGE UP (ref 0–0.2)
BASOPHILS NFR BLD AUTO: 0.3 % — SIGNIFICANT CHANGE UP (ref 0–2)
EOSINOPHIL # BLD AUTO: 0.1 K/UL — SIGNIFICANT CHANGE UP (ref 0–0.5)
EOSINOPHIL NFR BLD AUTO: 0.7 % — SIGNIFICANT CHANGE UP (ref 0–6)
HCT VFR BLD CALC: 36.2 % — SIGNIFICANT CHANGE UP (ref 34.5–45)
HGB BLD-MCNC: 12.1 G/DL — SIGNIFICANT CHANGE UP (ref 11.5–15.5)
IMM GRANULOCYTES NFR BLD AUTO: 2.3 % — HIGH (ref 0–1.5)
LYMPHOCYTES # BLD AUTO: 1.7 K/UL — SIGNIFICANT CHANGE UP (ref 1–3.3)
LYMPHOCYTES # BLD AUTO: 12.1 % — LOW (ref 13–44)
MCHC RBC-ENTMCNC: 32.6 PG — SIGNIFICANT CHANGE UP (ref 27–34)
MCHC RBC-ENTMCNC: 33.4 GM/DL — SIGNIFICANT CHANGE UP (ref 32–36)
MCV RBC AUTO: 97.6 FL — SIGNIFICANT CHANGE UP (ref 80–100)
MONOCYTES # BLD AUTO: 0.78 K/UL — SIGNIFICANT CHANGE UP (ref 0–0.9)
MONOCYTES NFR BLD AUTO: 5.5 % — SIGNIFICANT CHANGE UP (ref 2–14)
NEUTROPHILS # BLD AUTO: 11.13 K/UL — HIGH (ref 1.8–7.4)
NEUTROPHILS NFR BLD AUTO: 79.1 % — HIGH (ref 43–77)
NRBC # BLD: 0 /100 WBCS — SIGNIFICANT CHANGE UP (ref 0–0)
PLATELET # BLD AUTO: 209 K/UL — SIGNIFICANT CHANGE UP (ref 150–400)
RBC # BLD: 3.71 M/UL — LOW (ref 3.8–5.2)
RBC # FLD: 14.4 % — SIGNIFICANT CHANGE UP (ref 10.3–14.5)
WBC # BLD: 14.07 K/UL — HIGH (ref 3.8–10.5)
WBC # FLD AUTO: 14.07 K/UL — HIGH (ref 3.8–10.5)

## 2020-04-19 NOTE — HISTORY OF PRESENT ILLNESS
[Home] : at home, [unfilled] , at the time of the visit. [Other Location: e.g. Home (Enter Location, City,State)___] : at [unfilled] [Patient] : the patient [Self] : self [Disease:__________________________] : Disease: [unfilled] [Treatment Protocol] : Treatment Protocol [Cycle: ___] : Cycle: [unfilled] [de-identified] : Dec 24, 2019\par 25 yo female with newly diagnosed Hodgkins lymphoma. Pt states in Nov 2019 she noticed neck adenopathy, associated with pruritus and skin rash. She went to see her pcp who referred her for ENT evaluation. Excisional node biopsy was done which was c/w Hodgkins lymphoma. Pt also had PET/CT done which showed adenopathy in chest, neck and supraclavicular as well as axillary areas. She denies any night sweats but does have some increased warmth. She reports the pruritus prevents her from sleeping. She has a cousin who had a diagnosis of Hodgkins lymphoma at age 27. Pt is here today with her live-in boyfriend, close family friend and mother. She is tearful and concerned about effects of chemotherapy. She has some information about diagnosis and treatment already - based on her own review. She is eager to start treatment.  [de-identified] : II [de-identified] : \par  Pathology             Final\par \par No Documents Attached\par \par \par \par \par   [facility] Accession Number : [id number]\par \par FABIAN WAKEFIELD                     2\par \par \par \par Hematopathology Report\par \par \par \par \par Final Diagnosis\par _\par 1.  Lymph node, cervical, left:\par - Classical Hodgkin's lymphoma\par \par See note and description.\par \par Diagnostic note:\par The histologic and immunohistochemical findings are diagnostic of\par Classical Hodgkin's Lymphoma.\par \par Microscopic description:\par Histologic sections show a lymph node with completely effaced\par lyubov architecture. The large Hodgkin/Forrest-Eduar (HRS) cells\par and smudge cells are identified in the back ground of extensive\par necrosis as well as mixed inflammatory infiltrate including\par eosinophils, neutrophils and plasma cells.\par \par Immunohistochemical stains for CD30, CD15, PAX5, CD20, CD3, MUM1,\par EBV encoded RNA (ANNALISA) in situ hybridization, CD79a, CD45 stains\par performed on formalin fixed paraffin embedded tissue, block 1A.\par \par Neoplastic cells are:  Hodgkin/Forrest-Eduar (HRS) cells\par Positive:  CD30, CD15, PAX-5 (weak), MUM1\par Negative:  CD45, CD79a, CD20, CD3, ANNALISA\par \par Additional stains in progress an addendum to follow\par \par Flow cytometry analysis:\par Lymphocytes (52% of cells): Heterogeneous population of T-cells\par (with normal CD4 to CD8 ratio), natural killer cells, and\par polytypic B-cells. The lymphocyte immunophenotypic findings show\par no diagnostic abnormalities.\par \par Slide(s) with built in immunohistochemical study control(s)\par associated and negative control with this case has/have been\par verified by the sign out pathologist.\par For slide(s) without built in controls positive control slides\par has/have been reviewed and approved by immunohistochemistry lab\par These immunohistochemical/ in-situ hybridization tests have been\par developed and their performance characteristics determined by\par [facility], Department of\par Pathology, Division of Immunopathology, [address] [place] [address]\[place] [address].  It has not been cleared or approved by the\par U.S. Food and Drug Administration.  The FDA has determined that\par such clearance or approval is not necessary.  This test is used\par for clinical purposes.  The laboratory is certified under the\par CLIA-88 as qualified to perform high\par \par \par \par \par \par FABIAN WAKEFIELD                     2\par \par \par \par Hematopathology Report\par \par \par \par \par complexity clinical testing.\par \par Verified by: Blair Rojas M.D.\par (Electronic Signature)\par Reported on: 12/18/19 16:18 EST, [address] [facility],\[place] [address]\par Phone: [phone]   Fax: [phone]\par _________________________________________________________________\par \par Clinical History\par N/A\par \par Specimen(s) Submitted\par 1-Left cervical lymph node\par \par Gross Description\par \par The specimen is received fresh and the specimen container is\par labeled: Left cervical lymph node.  It consists of a 2.5 x 2.0 x\par 1.9 cm a bisected lymph node.  The specimen is further sectioned\par and entirely submitted in four cassettes.  Lymphoma workup is\par performed.\par \par In addition to other data that may appear on the specimen\par container, the label has been inspected to confirm the presence\par of the patient's name and date of birth.\par \par Sis Yu 12/13/2019 18:12\par \par  \par \par  Ordered by: CASA SANTOS       Collected/Examined: [id number] 01:39PM       \par Verified by: CASA SANTOS [id number] 01:41PM       \par  Result Communication: No patient communication needed at this time;\par Stage: Final       \par  Performed at: [facility]       Resulted: [id number] 04:18PM       Last Updated: [id number] 01:41PM       Accession: [id number]        [FreeTextEntry1] : ABVD regimen [de-identified] : Feb 13, 2020\sascha Pt is here with her boyfriend. She has tolerated cycle 1 of chemo well. Main complaints have been acute nausea and diaphoresis within 24hr of chemo. She reports the initial episode was significant, however since then it has been less intense. She has had a 1 week delay with dose 2 of cycle 1 due to neutropenia, and has now started on neulasta post chemo. She has had some bone pain with the neulasta and is asking whether this medication will be required with each chemo. Otherwise she is doing well. \par ------------------------------\par April 16, 2020\sascha Pt reports good tolerance to chameo. SHe has had significant alopecia, and reports scalp sensitivity, she also has acid reflux, mild neuropathy in hands, otherwise she is doing well. She denies any fever or chills, no other acute symptoms. She completed the PET scan as scheduled during the interim..

## 2020-04-19 NOTE — REVIEW OF SYSTEMS
[Negative] : Allergic/Immunologic [de-identified] : scalp itching/sensitivity [de-identified] : tingling and mild numbness in hands

## 2020-04-19 NOTE — RESULTS/DATA
[FreeTextEntry1] : \par  Pathology             Final\par \par No Documents Attached\par \par \par \par \par   [facility] Accession Number : [id number]\par \par FABIAN WAKEFIELD                     3\par \par \par \par Hematopathology Report\par \par \par \par \par Final Diagnosis\par 1, 2. Bone marrow biopsy and bone marrow aspirate\par - Normocellular marrow with maturing trilineage\par hematopoiesis\par - No morphologic or immunophenotypic evidence supporting\par involvement by lymphoma\par See note and description.\par \par Diagnostic note: Per chart review, patient has a recent diagnosis\par of Hodgkin Lymphoma. Current biopsy shows normocellular marrow\par with maturing trilineage hematopoiesis. There is no morphologic\par or immunophenotypic evidence supporting involvement by lymphoma\par Comprehensive report with results of pending ancillary studies to\par follow.\par \par Ancillary studies\par Special stains:\par Bone marrow aspirate iron stain: Iron stores are present. There\par is no increase in ring\par sideroblasts\par \par Flow cytometry (10-FL-):  Lymphocytes (3% of cells):\par Heterogeneous population of T-cells (with normal CD4 to CD8\par ratio), natural killer cells, and polytypic B-cells.\par \par Cytogenetics:  In process, will be reported as an addendum\par \par Microscopic description:\par 1. Biopsy:\par Quality:   Adequate\par Cellularity:    60-70%\par Myeloid lineage:     Normal in number, exhibits full maturation\par Erythroid lineage:  Normal in number, exhibits full maturation\par M: E Within normal limits\par Megakaryocytes:     Normal in number, unremarkable morphology\par Blasts:   Not increased\par Lymphocytes    Not increased in number, mostly small mature\par looking lymphocytes are seen\par Plasma cells:  Not increased\par Stroma:   Unremarkable, iron stores are present\par Clot:     Marrow fragments with similar morphology as the core\par biopsy\par \par 2. Aspirate:\par Quality / cellularity:    Spicular and cellular aspirates\par Granulocytes:  Normal in number, exhibit full maturation, no\par significant dysplastic changes seen\par \par \par \par \par \par \par FABIAN WAKEFIELD                     3\par \par \par \par Hematopathology Report\par \par \par \par \par Erythrocytes:        Normal in number, exhibit full maturation,\par no significant dysplastic\par changes seen\par M: E ratio:          Within normal limits\par Megakaryocytes:      Normal in number, unremarkable morphology\par Lymphocytes:   Not increased\par Plasma cells:        Not increased\par Blasts:         Not increased\par \par Bone Marrow Aspirate Smear Differential Cell Count:  400 Cells.\par Type            %    Normal*   Type           %    Normal*\par Blast                0%   0-3\par Promyelocyte         6%   1-8       Pronormoblast  3%   0-2\par Myelocyte       9%   10-15          Normoblast     17%  15-25\par Metamyelocyte   9%   10-15          Monocyte  3%   0-2\par Band/Neutrophil      30%  12-25          Lymphocyte     19%  10-\par 15\par Eosinophil           3%   1-5       Plasma cell    0%   0-1\par Basophil        1%   0-1\par *Adult Range\par \par Verified by: Hua Chaney MD\par (Electronic Signature)\par Reported on: 12/27/19 10:22 EST, [address],\[place] [address]\par Phone: [phone]   Fax: [phone]\par _________________________________________________________________\par \par Clinical History\par Cervical lymphadenopathy suspected Hodgkin's lymphoma\par \par Specimen(s) Submitted\par 1     Bone marrow biopsy\par 2     Bone marrow aspirate\par \par Gross Description\par 1. The specimen is received in bouin's fixative and the specimen\par container is labeled: Bone marrow.  It consists of a 1.4 x 0.2 cm\par bone marrow core with a 1.5 x 1.3 x 0.2 cm aggregate of blood\par clot.  Entirely submitted.  Two cassettes: A = bone marrow core;\par B = blood clot.\par \par 2. Two Lyle-Giemsa stained bone marrow aspirate smears are\par submitted\par \par In addition to other data that may appear on the specimen\par container, the label has been inspected to confirm the presence\par of the patient's name and date of birth.\par \par \par \par \par \par ASHU FABIAN                     3\par \par \par \par Hematopathology Report\par \par \par \par \par \par K[facility] 12/24/2019 18:33\par \par  \par \par  Ordered by: RUKHSANA MARIN       Collected/Examined: 24Dec2019 12:00PM       \par Verified by: RUKHSANA MARIN [id number] 11:24AM       \par  Result Communication: No patient communication needed at this time;\par Stage: Final       \par  Performed at: [facility]       Resulted: 27Dec2019 10:22AM       Last Updated: 27Dec2019 11:24AM       Accession: [id number]       \par  Pathology             Final\par \par No Documents Attached\par \par \par \par \par   [facility] Accession Number : [id number]\par \par WAKEFIELDFABIAN PURDY                     2\par \par \par \par Hematopathology Report\par \par \par \par \par Final Diagnosis\par _\par 1.  Lymph node, cervical, left:\par - Classical Hodgkin's lymphoma\par \par See note and description.\par \par Diagnostic note:\par The histologic and immunohistochemical findings are diagnostic of\par Classical Hodgkin's Lymphoma.\par \par Microscopic description:\par Histologic sections show a lymph node with completely effaced\par lyubov architecture. The large Hodgkin/Forrest-Eduar (HRS) cells\par and smudge cells are identified in the back ground of extensive\par necrosis as well as mixed inflammatory infiltrate including\par eosinophils, neutrophils and plasma cells.\par \par Immunohistochemical stains for CD30, CD15, PAX5, CD20, CD3, MUM1,\par EBV encoded RNA (ANNALISA) in situ hybridization, CD79a, CD45 stains\par performed on formalin fixed paraffin embedded tissue, block 1A.\par \par Neoplastic cells are:  Hodgkin/Forrest-Eduar (HRS) cells\par Positive:  CD30, CD15, PAX-5 (weak), MUM1\par Negative:  CD45, CD79a, CD20, CD3, ANNALISA\par \par Additional stains in progress an addendum to follow\par \par Flow cytometry analysis:\par Lymphocytes (52% of cells): Heterogeneous population of T-cells\par (with normal CD4 to CD8 ratio), natural killer cells, and\par polytypic B-cells. The lymphocyte immunophenotypic findings show\par no diagnostic abnormalities.\par \par Slide(s) with built in immunohistochemical study control(s)\par associated and negative control with this case has/have been\par verified by the sign out pathologist.\par For slide(s) without built in controls positive control slides\par has/have been reviewed and approved by immunohistochemistry lab\par These immunohistochemical/ in-situ hybridization tests have been\par developed and their performance characteristics determined by\par [facility] / [facility], Department of\par Pathology, Division of Immunopathology, [address] [place] [address]\[place] [address].  It has not been cleared or approved by the\par U.S. Food and Drug Administration.  The FDA has determined that\par such clearance or approval is not necessary.  This test is used\par for clinical purposes.  The laboratory is certified under the\par CLIA-88 as qualified to perform high\par \par \par \par \par \par ASHU FABIAN                     2\par \par \par \par Hematopathology Report\par \par \par \par \par complexity clinical testing.\par \par Verified by: Blair Rojas M.D.\par (Electronic Signature)\par Reported on: 12/18/19 16:18 EST, [address],\[place] [address]\par Phone: [phone]   Fax: [phone]\par _________________________________________________________________\par \par Clinical History\par N/A\par \par Specimen(s) Submitted\par 1-Left cervical lymph node\par \par Gross Description\par \par The specimen is received fresh and the specimen container is\par labeled: Left cervical lymph node.  It consists of a 2.5 x 2.0 x\par 1.9 cm a bisected lymph node.  The specimen is further sectioned\par and entirely submitted in four cassettes.  Lymphoma workup is\par performed.\par \par In addition to other data that may appear on the specimen\par container, the label has been inspected to confirm the presence\par of the patient's name and date of birth.\par \par Sis Yu 12/13/2019 18:12\par \par  \par \par  Ordered by: CASA SANTOS       Collected/Examined: [id number] 01:39PM       \par Verified by: CASA SANTOS [id number] 01:41PM       \par  Result Communication: No patient communication needed at this time;\par Stage: Final       \par  Performed at: [facility]       Resulted: [id number] 04:18PM       Last Updated: [id number] 01:41PM       Accession: [id number]

## 2020-04-20 DIAGNOSIS — R11.2 NAUSEA WITH VOMITING, UNSPECIFIED: ICD-10-CM

## 2020-04-20 DIAGNOSIS — Z51.89 ENCOUNTER FOR OTHER SPECIFIED AFTERCARE: ICD-10-CM

## 2020-04-20 DIAGNOSIS — Z51.11 ENCOUNTER FOR ANTINEOPLASTIC CHEMOTHERAPY: ICD-10-CM

## 2020-04-20 DIAGNOSIS — C81.90 HODGKIN LYMPHOMA, UNSPECIFIED, UNSPECIFIED SITE: ICD-10-CM

## 2020-04-23 DIAGNOSIS — T82.598A OTHER MECHANICAL COMPLICATION OF OTHER CARDIAC AND VASCULAR DEVICES AND IMPLANTS, INITIAL ENCOUNTER: ICD-10-CM

## 2020-04-27 ENCOUNTER — APPOINTMENT (OUTPATIENT)
Dept: SURGERY | Facility: CLINIC | Age: 25
End: 2020-04-27
Payer: COMMERCIAL

## 2020-04-27 PROCEDURE — 99212 OFFICE O/P EST SF 10 MIN: CPT | Mod: 95

## 2020-04-27 NOTE — ASSESSMENT
[FreeTextEntry1] : Patient with hodgkins, doing well will continue under care of oncologist and return as needed.

## 2020-04-27 NOTE — PHYSICAL EXAM
[de-identified] : left   incision healing, scar min discussed, no swelling appreciated.  [Normal] : extraocular movements are normal [de-identified] : Psychiatric:  oriented to person, place and time with appropriate affect

## 2020-04-27 NOTE — HISTORY OF PRESENT ILLNESS
[de-identified] : Before initiating the conversation, I reviewed  the limitations of telemedicine and the remote possibility that it may not be HIPPA compliant with the patient.  The patient stated an understanding  and consented freely to the proceeding.  Verbal consent given on    4/22/20  by  the patient for teleservices.\par Patient did not want to keep appt in view of COVID.  understands PE not possible virtually. \par Patient referred by Dr. Robert for evaluation of left supraclavicular adenopathy. One month ago patient noted left neck swelling complaining of fatigue and whole body itching, with chills and increased bruising. Patient has not had imaging performed, denies change in weight, history of skin cancer her recent illness.\par 12/13/19 excision left cervical LNs, path classical hodgkins. denies pain or scarring.  has final chemo scheduled for friday with significant improvement on mid treatment scans.  patient tolerating well.

## 2020-04-27 NOTE — REASON FOR VISIT
[Home] : at home, [unfilled] , at the time of the visit. [Medical Office: ([facility])___] : at the medical office located in  [Patient] : the patient [Other:____] : [unfilled] [Self] : self [FreeTextEntry4] : rodriguezca [Follow-Up: _____] : a [unfilled] follow-up visit [Other: _____] : [unfilled]

## 2020-05-01 ENCOUNTER — RESULT REVIEW (OUTPATIENT)
Age: 25
End: 2020-05-01

## 2020-05-01 ENCOUNTER — LABORATORY RESULT (OUTPATIENT)
Age: 25
End: 2020-05-01

## 2020-05-01 ENCOUNTER — APPOINTMENT (OUTPATIENT)
Dept: INFUSION THERAPY | Facility: HOSPITAL | Age: 25
End: 2020-05-01

## 2020-05-01 LAB
BASOPHILS # BLD AUTO: 0.03 K/UL — SIGNIFICANT CHANGE UP (ref 0–0.2)
BASOPHILS NFR BLD AUTO: 0.2 % — SIGNIFICANT CHANGE UP (ref 0–2)
EOSINOPHIL # BLD AUTO: 0.15 K/UL — SIGNIFICANT CHANGE UP (ref 0–0.5)
EOSINOPHIL NFR BLD AUTO: 1.2 % — SIGNIFICANT CHANGE UP (ref 0–6)
HCT VFR BLD CALC: 36.6 % — SIGNIFICANT CHANGE UP (ref 34.5–45)
HGB BLD-MCNC: 12.2 G/DL — SIGNIFICANT CHANGE UP (ref 11.5–15.5)
IMM GRANULOCYTES NFR BLD AUTO: 1.4 % — SIGNIFICANT CHANGE UP (ref 0–1.5)
LYMPHOCYTES # BLD AUTO: 1.4 K/UL — SIGNIFICANT CHANGE UP (ref 1–3.3)
LYMPHOCYTES # BLD AUTO: 11.6 % — LOW (ref 13–44)
MCHC RBC-ENTMCNC: 32.6 PG — SIGNIFICANT CHANGE UP (ref 27–34)
MCHC RBC-ENTMCNC: 33.3 GM/DL — SIGNIFICANT CHANGE UP (ref 32–36)
MCV RBC AUTO: 97.9 FL — SIGNIFICANT CHANGE UP (ref 80–100)
MONOCYTES # BLD AUTO: 0.69 K/UL — SIGNIFICANT CHANGE UP (ref 0–0.9)
MONOCYTES NFR BLD AUTO: 5.7 % — SIGNIFICANT CHANGE UP (ref 2–14)
NEUTROPHILS # BLD AUTO: 9.58 K/UL — HIGH (ref 1.8–7.4)
NEUTROPHILS NFR BLD AUTO: 79.9 % — HIGH (ref 43–77)
NRBC # BLD: 0 /100 WBCS — SIGNIFICANT CHANGE UP (ref 0–0)
PLATELET # BLD AUTO: 233 K/UL — SIGNIFICANT CHANGE UP (ref 150–400)
RBC # BLD: 3.74 M/UL — LOW (ref 3.8–5.2)
RBC # FLD: 14.6 % — HIGH (ref 10.3–14.5)
WBC # BLD: 12.02 K/UL — HIGH (ref 3.8–10.5)
WBC # FLD AUTO: 12.02 K/UL — HIGH (ref 3.8–10.5)

## 2020-05-07 ENCOUNTER — APPOINTMENT (OUTPATIENT)
Dept: HEMATOLOGY ONCOLOGY | Facility: CLINIC | Age: 25
End: 2020-05-07

## 2020-05-22 ENCOUNTER — OUTPATIENT (OUTPATIENT)
Dept: OUTPATIENT SERVICES | Facility: HOSPITAL | Age: 25
LOS: 1 days | End: 2020-05-22
Payer: COMMERCIAL

## 2020-05-22 ENCOUNTER — APPOINTMENT (OUTPATIENT)
Dept: NUCLEAR MEDICINE | Facility: IMAGING CENTER | Age: 25
End: 2020-05-22
Payer: COMMERCIAL

## 2020-05-22 DIAGNOSIS — C81.90 HODGKIN LYMPHOMA, UNSPECIFIED, UNSPECIFIED SITE: ICD-10-CM

## 2020-05-22 PROCEDURE — 78815 PET IMAGE W/CT SKULL-THIGH: CPT

## 2020-05-22 PROCEDURE — 78815 PET IMAGE W/CT SKULL-THIGH: CPT | Mod: 26,PS

## 2020-05-22 PROCEDURE — A9552: CPT

## 2020-06-17 ENCOUNTER — OUTPATIENT (OUTPATIENT)
Dept: OUTPATIENT SERVICES | Facility: HOSPITAL | Age: 25
LOS: 1 days | Discharge: HOME | End: 2020-06-17

## 2020-06-17 DIAGNOSIS — C81.70 OTHER HODGKIN LYMPHOMA, UNSPECIFIED SITE: ICD-10-CM

## 2020-06-18 ENCOUNTER — APPOINTMENT (OUTPATIENT)
Dept: RADIATION ONCOLOGY | Facility: CLINIC | Age: 25
End: 2020-06-18
Payer: COMMERCIAL

## 2020-06-18 PROCEDURE — 99205 OFFICE O/P NEW HI 60 MIN: CPT | Mod: GC,95

## 2020-06-18 RX ORDER — LEVOFLOXACIN 500 MG/1
500 TABLET, FILM COATED ORAL DAILY
Qty: 30 | Refills: 0 | Status: COMPLETED | COMMUNITY
Start: 2020-01-31 | End: 2020-06-18

## 2020-06-18 RX ORDER — ONDANSETRON 8 MG/1
8 TABLET, ORALLY DISINTEGRATING ORAL
Qty: 60 | Refills: 3 | Status: DISCONTINUED | COMMUNITY
Start: 2019-12-24 | End: 2020-06-18

## 2020-06-18 RX ORDER — APREPITANT 80 MG/1
80 CAPSULE ORAL
Qty: 2 | Refills: 0 | Status: DISCONTINUED | COMMUNITY
Start: 2020-01-03 | End: 2020-06-18

## 2020-06-18 RX ORDER — HYDROCORTISONE 1 %
12 CREAM (GRAM) TOPICAL
Qty: 225 | Refills: 0 | Status: DISCONTINUED | COMMUNITY
Start: 2019-11-20 | End: 2020-06-18

## 2020-06-18 RX ORDER — POLYETHYLENE GLYCOL 3350 17 G/17G
17 POWDER, FOR SOLUTION ORAL DAILY
Qty: 14 | Refills: 0 | Status: DISCONTINUED | COMMUNITY
Start: 2020-01-22 | End: 2020-06-18

## 2020-06-18 RX ORDER — TRIAMCINOLONE ACETONIDE 1 MG/G
0.1 CREAM TOPICAL TWICE DAILY
Qty: 1 | Refills: 0 | Status: DISCONTINUED | COMMUNITY
Start: 2020-01-22 | End: 2020-06-18

## 2020-06-18 RX ORDER — SENNOSIDES 8.6 MG TABLETS 8.6 MG/1
8.6 TABLET ORAL DAILY
Qty: 30 | Refills: 0 | Status: DISCONTINUED | COMMUNITY
Start: 2020-01-22 | End: 2020-06-18

## 2020-06-18 RX ORDER — DEXAMETHASONE 4 MG/1
4 TABLET ORAL
Qty: 10 | Refills: 0 | Status: COMPLETED | COMMUNITY
Start: 2019-12-24 | End: 2020-06-18

## 2020-06-18 RX ORDER — ZOLPIDEM TARTRATE 6.25 MG/1
6.25 TABLET, EXTENDED RELEASE ORAL
Qty: 14 | Refills: 0 | Status: DISCONTINUED | COMMUNITY
Start: 2019-12-30 | End: 2020-06-18

## 2020-06-18 RX ORDER — FLUOCINONIDE 0.05 MG/G
0.05 OINTMENT TOPICAL
Qty: 60 | Refills: 0 | Status: COMPLETED | COMMUNITY
Start: 2019-11-20 | End: 2020-06-18

## 2020-06-22 ENCOUNTER — APPOINTMENT (OUTPATIENT)
Dept: HEMATOLOGY ONCOLOGY | Facility: CLINIC | Age: 25
End: 2020-06-22
Payer: COMMERCIAL

## 2020-06-22 VITALS
WEIGHT: 262.35 LBS | DIASTOLIC BLOOD PRESSURE: 75 MMHG | HEART RATE: 78 BPM | TEMPERATURE: 98.4 F | RESPIRATION RATE: 17 BRPM | BODY MASS INDEX: 37.64 KG/M2 | SYSTOLIC BLOOD PRESSURE: 109 MMHG | OXYGEN SATURATION: 98 %

## 2020-06-22 PROCEDURE — 99214 OFFICE O/P EST MOD 30 MIN: CPT

## 2020-06-22 NOTE — REVIEW OF SYSTEMS
[Fatigue] : fatigue [Joint Pain] : joint pain [Anxiety] : anxiety [Negative] : Heme/Lymph [Muscle Weakness] : no muscle weakness [Muscle Pain] : no muscle pain [Insomnia] : no insomnia [Gait Disturbance] : no gait disturbance [Suicidal] : not suicidal [Depression] : no depression

## 2020-06-22 NOTE — ASSESSMENT
[FreeTextEntry1] : Stage IIB Hodgkins lymphoma \par s/p ABVD x4 cycles\par \par post chemo PET w/o any avidity or signs of active disease.\par \par RadOnc management in progress

## 2020-06-22 NOTE — END OF VISIT
[Resident] : Resident [>50% of the face to face encounter time was spent on counseling and/or coordination of care for ___] : Greater than 50% of the face to face encounter time was spent on counseling and/or coordination of care for [unfilled] [Time Spent: ___ minutes] : I have spent [unfilled] minutes of time on the encounter.

## 2020-06-22 NOTE — HISTORY OF PRESENT ILLNESS
[Disease:__________________________] : Disease: [unfilled] [de-identified] : Dec 24, 2019\par 23 yo female with newly diagnosed Hodgkins lymphoma. Pt states in Nov 2019 she noticed neck adenopathy, associated with pruritus and skin rash. She went to see her pcp who referred her for ENT evaluation. Excisional node biopsy was done which was c/w Hodgkins lymphoma. Pt also had PET/CT done which showed adenopathy in chest, neck and supraclavicular as well as axillary areas. She denies any night sweats but does have some increased warmth. She reports the pruritus prevents her from sleeping. She has a cousin who had a diagnosis of Hodgkins lymphoma at age 27. Pt is here today with her live-in boyfriend, close family friend and mother. She is tearful and concerned about effects of chemotherapy. She has some information about diagnosis and treatment already - based on her own review. She is eager to start treatment.  [de-identified] : II [Treatment Protocol] : Treatment Protocol [de-identified] : \par  Pathology             Final\par \par No Documents Attached\par \par \par \par \par   [facility] Accession Number : [id number]\par \par FABIAN WAKEFIELD                     2\par \par \par \par Hematopathology Report\par \par \par \par \par Final Diagnosis\par _\par 1.  Lymph node, cervical, left:\par - Classical Hodgkin's lymphoma\par \par See note and description.\par \par Diagnostic note:\par The histologic and immunohistochemical findings are diagnostic of\par Classical Hodgkin's Lymphoma.\par \par Microscopic description:\par Histologic sections show a lymph node with completely effaced\par lyubov architecture. The large Hodgkin/Forrest-Eduar (HRS) cells\par and smudge cells are identified in the back ground of extensive\par necrosis as well as mixed inflammatory infiltrate including\par eosinophils, neutrophils and plasma cells.\par \par Immunohistochemical stains for CD30, CD15, PAX5, CD20, CD3, MUM1,\par EBV encoded RNA (ANNALISA) in situ hybridization, CD79a, CD45 stains\par performed on formalin fixed paraffin embedded tissue, block 1A.\par \par Neoplastic cells are:  Hodgkin/Forrest-Eduar (HRS) cells\par Positive:  CD30, CD15, PAX-5 (weak), MUM1\par Negative:  CD45, CD79a, CD20, CD3, ANNALISA\par \par Additional stains in progress an addendum to follow\par \par Flow cytometry analysis:\par Lymphocytes (52% of cells): Heterogeneous population of T-cells\par (with normal CD4 to CD8 ratio), natural killer cells, and\par polytypic B-cells. The lymphocyte immunophenotypic findings show\par no diagnostic abnormalities.\par \par Slide(s) with built in immunohistochemical study control(s)\par associated and negative control with this case has/have been\par verified by the sign out pathologist.\par For slide(s) without built in controls positive control slides\par has/have been reviewed and approved by immunohistochemistry lab\par These immunohistochemical/ in-situ hybridization tests have been\par developed and their performance characteristics determined by\par [facility], Department of\par Pathology, Division of Immunopathology, [address] [place] [address]\[place] [address].  It has not been cleared or approved by the\par U.S. Food and Drug Administration.  The FDA has determined that\par such clearance or approval is not necessary.  This test is used\par for clinical purposes.  The laboratory is certified under the\par CLIA-88 as qualified to perform high\par \par \par \par \par \par FABIAN WAKEFIELD                     2\par \par \par \par Hematopathology Report\par \par \par \par \par complexity clinical testing.\par \par Verified by: Blair Rojas M.D.\par (Electronic Signature)\par Reported on: 12/18/19 16:18 EST, [address],\[place] [address]\par Phone: [phone]   Fax: [phone]\par _________________________________________________________________\par \par Clinical History\par N/A\par \par Specimen(s) Submitted\par 1-Left cervical lymph node\par \par Gross Description\par \par The specimen is received fresh and the specimen container is\par labeled: Left cervical lymph node.  It consists of a 2.5 x 2.0 x\par 1.9 cm a bisected lymph node.  The specimen is further sectioned\par and entirely submitted in four cassettes.  Lymphoma workup is\par performed.\par \par In addition to other data that may appear on the specimen\par container, the label has been inspected to confirm the presence\par of the patient's name and date of birth.\par \par Sis Yu 12/13/2019 18:12\par \par  \par \par  Ordered by: CASA SANTOS       Collected/Examined: [id number] 01:39PM       \par Verified by: CASA SANTOS [id number] 01:41PM       \par  Result Communication: No patient communication needed at this time;\par Stage: Final       \par  Performed at: [facility]       Resulted: [id number] 04:18PM       Last Updated: [id number] 01:41PM       Accession: [id number]        [FreeTextEntry1] : ABVD regimen s/p 4 cycles, completed May 2020 [Cycle: ___] : Cycle: [unfilled] [de-identified] : Feb 13, 2020\sascha Pt is here with her boyfriend. She has tolerated cycle 1 of chemo well. Main complaints have been acute nausea and diaphoresis within 24hr of chemo. She reports the initial episode was significant, however since then it has been less intense. She has had a 1 week delay with dose 2 of cycle 1 due to neutropenia, and has now started on neulasta post chemo. She has had some bone pain with the neulasta and is asking whether this medication will be required with each chemo. Otherwise she is doing well. \par ------------------------------\par April 16, 2020\sascha Pt reports good tolerance to chameo. SHe has had significant alopecia, and reports scalp sensitivity, she also has acid reflux, mild neuropathy in hands, otherwise she is doing well. She denies any fever or chills, no other acute symptoms. She completed the PET scan as scheduled during the interim.. \par ------------------------------morris Pt is here for scheduled f/u appt. She was seen by Dr Delgado in the interim. She is agreeable to completing RT as recommended. She has questions regarding planned follow-up in the future, when to remove the mediport, when will she feel more normal and have less fatigue, whether she can exercise regularly...

## 2020-06-22 NOTE — PHYSICAL EXAM
[Restricted in physically strenuous activity but ambulatory and able to carry out work of a light or sedentary nature] : Status 1- Restricted in physically strenuous activity but ambulatory and able to carry out work of a light or sedentary nature, e.g., light house work, office work [Obese] : obese [Normal] : grossly intact [de-identified] : deferred [de-identified] :  well healed L neck surgical scar

## 2020-06-22 NOTE — VITALS
[Maximal Pain Intensity: 7/10] : 7/10 [Pain Description/Quality: ___] : Pain description/quality: [unfilled] [Least Pain Intensity: 0/10] : 0/10 [Pain Duration: ___] : Pain duration: [unfilled] [Pain Location: ___] : Pain Location: [unfilled] [OTC] : OTC [80: Normal activity with effort; some signs or symptoms of disease.] : 80: Normal activity with effort; some signs or symptoms of disease.  [NSAID/Non-Opioid] : NSAID/Non-Opioid [ECOG Performance Status: 1 - Restricted in physically strenuous activity but ambulatory and able to carry out work of a light or sedentary nature] : Performance Status: 1 - Restricted in physically strenuous activity but ambulatory and able to carry out work of a light or sedentary nature, e.g., light house work, office work [Pain Interferes with ADLs] : Pain does not interfere with activities of daily living

## 2020-06-22 NOTE — HISTORY OF PRESENT ILLNESS
[Home] : at home, [unfilled] , at the time of the visit. [Other:____] : [unfilled] [Medical Office: ([facility])___] : at the medical office located in  [Verbal consent obtained from patient] : the patient, [unfilled] [FreeTextEntry1] : 23 yo female with newly diagnosed [place] Stage II unfavorable Hodgkins lymphoma involving the bilateral supraclav, axilla, and mediastinal lymph nodes\par \par Pt states in Nov 2019 she noticed neck adenopathy, associated with pruritus and skin rash. She went to see her pcp who referred her for ENT evaluation. Excisional node biopsy was done on 12/13/19. Pathology showed classical hodgkin's lymphoma\par \par PET/CT 12/16/19 showed Multiple hypermetabolic bilateral cervical and supraclavicular lymph nodes, more extensive on the left, and difficult to delineate on CT in the absence of intravenous contrast. For reference, confluent left supraclavicular lymphadenopathy measures up to approximately 6.4 x 2.8 cm demonstrates SUV 16.6 (image 56). A difficult to delineate right supraclavicular node demonstrates SUV 14.0 (image 57). \par CHEST: There are hypermetabolic bilateral mediastinal, bilateral axillary, left perihilar, left retropectoral, and right internal mammary lymph nodes. For reference, difficult to delineate subcarinal lymph node demonstrates SUV 4.8 (image 84). A difficult to delineate left perihilar node demonstrates SUV 5.7 (image 86). A right internal mammary lymph node at the level of the 2nd-3rd intercostal space measures 1.5 x 1.5 cm (SUV 5.7; image 83). A left axillary node measures 2.1 x 1.5 cm (SUV 21.9; image 72).\par \par She denies any night sweats but does have some increased warmth. She reports the pruritus prevents her from sleeping. She has a cousin who had a diagnosis of Hodgkins lymphoma at age 27. at diagnosis ESR was 22, \par \par She started ABVD and tolerated it well.  PET scan on 5/22/20 showed no PET avid disease and resolution of diffuse bone marrow hypermetabolism\par

## 2020-06-22 NOTE — RESULTS/DATA
Neuro: c/w oral analgesics  CV: Hemodynamically stable, continue lisinopril for hypertensive control  Pulm: Saturating well on room air, encourage incentive spirometry  GI: continue regular diet as tolerated. Pepcid for GERD. Will give zofran additionally for nausea  : voiding spontaneously  Heme: c/w lovenox and SCDs for DVT ppx  Endo: Glipizide and ISS for BM control  MSK: PT following  Dispo: Continue routine post-op care, discharge to outpatient rehab facility per PT recommendations    DIANNE Yarbrough pgy2 [FreeTextEntry1] : PET 5/22/2020\par No residual PET avid disease noted on imaging.\par \par \par  Pathology             Final\par \par No Documents Attached\par \par \par \par \par   [facility] Accession Number : [id number]\par \par FABIAN WAKEFIELD                     3\par \par \par \par Hematopathology Report\par \par \par \par \par Final Diagnosis\par 1, 2. Bone marrow biopsy and bone marrow aspirate\par - Normocellular marrow with maturing trilineage\par hematopoiesis\par - No morphologic or immunophenotypic evidence supporting\par involvement by lymphoma\par See note and description.\par \par Diagnostic note: Per chart review, patient has a recent diagnosis\par of Hodgkin Lymphoma. Current biopsy shows normocellular marrow\par with maturing trilineage hematopoiesis. There is no morphologic\par or immunophenotypic evidence supporting involvement by lymphoma\par Comprehensive report with results of pending ancillary studies to\par follow.\par \par Ancillary studies\par Special stains:\par Bone marrow aspirate iron stain: Iron stores are present. There\par is no increase in ring\par sideroblasts\par \par Flow cytometry (10-FL-):  Lymphocytes (3% of cells):\par Heterogeneous population of T-cells (with normal CD4 to CD8\par ratio), natural killer cells, and polytypic B-cells.\par \par Cytogenetics:  In process, will be reported as an addendum\par \par Microscopic description:\par 1. Biopsy:\par Quality:   Adequate\par Cellularity:    60-70%\par Myeloid lineage:     Normal in number, exhibits full maturation\par Erythroid lineage:  Normal in number, exhibits full maturation\par M: E Within normal limits\par Megakaryocytes:     Normal in number, unremarkable morphology\par Blasts:   Not increased\par Lymphocytes    Not increased in number, mostly small mature\par looking lymphocytes are seen\par Plasma cells:  Not increased\par Stroma:   Unremarkable, iron stores are present\par Clot:     Marrow fragments with similar morphology as the core\par biopsy\par \par 2. Aspirate:\par Quality / cellularity:    Spicular and cellular aspirates\par Granulocytes:  Normal in number, exhibit full maturation, no\par significant dysplastic changes seen\par \par \par \par \par \par \par FABIAN WAKEFIELD                     3\par \par \par \par Hematopathology Report\par \par \par \par \par Erythrocytes:        Normal in number, exhibit full maturation,\par no significant dysplastic\par changes seen\par M: E ratio:          Within normal limits\par Megakaryocytes:      Normal in number, unremarkable morphology\par Lymphocytes:   Not increased\par Plasma cells:        Not increased\par Blasts:         Not increased\par \par Bone Marrow Aspirate Smear Differential Cell Count:  400 Cells.\par Type            %    Normal*   Type           %    Normal*\par Blast                0%   0-3\par Promyelocyte         6%   1-8       Pronormoblast  3%   0-2\par Myelocyte       9%   10-15          Normoblast     17%  15-25\par Metamyelocyte   9%   10-15          Monocyte  3%   0-2\par Band/Neutrophil      30%  12-25          Lymphocyte     19%  10-\par 15\par Eosinophil           3%   1-5       Plasma cell    0%   0-1\par Basophil        1%   0-1\par *Adult Range\par \par Verified by: Hua Chaney MD\par (Electronic Signature)\par Reported on: 12/27/19 10:22 EST, [address],\[place] [address]\par Phone: [phone]   Fax: [phone]\par _________________________________________________________________\par \par Clinical History\par Cervical lymphadenopathy suspected Hodgkin's lymphoma\par \par Specimen(s) Submitted\par 1     Bone marrow biopsy\par 2     Bone marrow aspirate\par \par Gross Description\par 1. The specimen is received in bouin's fixative and the specimen\par container is labeled: Bone marrow.  It consists of a 1.4 x 0.2 cm\par bone marrow core with a 1.5 x 1.3 x 0.2 cm aggregate of blood\par clot.  Entirely submitted.  Two cassettes: A = bone marrow core;\par B = blood clot.\par \par 2. Two Lyle-Giemsa stained bone marrow aspirate smears are\par submitted\par \par In addition to other data that may appear on the specimen\par container, the label has been inspected to confirm the presence\par of the patient's name and date of birth.\par \par \par \par \par \par ASHUFABIAN                     3\par \par \par \par Hematopathology Report\par \par \par \par \par \par KAMRON [facility] 12/24/2019 18:33\par \par  \par \par  Ordered by: RUKHSANA MARIN       Collected/Examined: [id number] 12:00PM       \par Verified by: RUKHSANA MARIN [id number] 11:24AM       \par  Result Communication: No patient communication needed at this time;\par Stage: Final       \par  Performed at: [facility]       Resulted: [id number] 10:22AM       Last Updated: 27Dec2019 11:24AM       Accession: [id number]       \par  Pathology             Final\par \par No Documents Attached\par \par \par \par \par   [facility] Accession Number : [id number]\par \par WAKEFIELDFABIAN PURDY                     2\par \par \par \par Hematopathology Report\par \par \par \par \par Final Diagnosis\par _\par 1.  Lymph node, cervical, left:\par - Classical Hodgkin's lymphoma\par \par See note and description.\par \par Diagnostic note:\par The histologic and immunohistochemical findings are diagnostic of\par Classical Hodgkin's Lymphoma.\par \par Microscopic description:\par Histologic sections show a lymph node with completely effaced\par lyubov architecture. The large Hodgkin/Forrest-Eduar (HRS) cells\par and smudge cells are identified in the back ground of extensive\par necrosis as well as mixed inflammatory infiltrate including\par eosinophils, neutrophils and plasma cells.\par \par Immunohistochemical stains for CD30, CD15, PAX5, CD20, CD3, MUM1,\par EBV encoded RNA (ANNALISA) in situ hybridization, CD79a, CD45 stains\par performed on formalin fixed paraffin embedded tissue, block 1A.\par \par Neoplastic cells are:  Hodgkin/Forrest-Eduar (HRS) cells\par Positive:  CD30, CD15, PAX-5 (weak), MUM1\par Negative:  CD45, CD79a, CD20, CD3, ANNALISA\par \par Additional stains in progress an addendum to follow\par \par Flow cytometry analysis:\par Lymphocytes (52% of cells): Heterogeneous population of T-cells\par (with normal CD4 to CD8 ratio), natural killer cells, and\par polytypic B-cells. The lymphocyte immunophenotypic findings show\par no diagnostic abnormalities.\par \par Slide(s) with built in immunohistochemical study control(s)\par associated and negative control with this case has/have been\par verified by the sign out pathologist.\par For slide(s) without built in controls positive control slides\par has/have been reviewed and approved by immunohistochemistry lab\par These immunohistochemical/ in-situ hybridization tests have been\par developed and their performance characteristics determined by\par [facility], Department of\par Pathology, Division of Immunopathology, [address] [place] [address]\[place] [address].  It has not been cleared or approved by the\par U.S. Food and Drug Administration.  The FDA has determined that\par such clearance or approval is not necessary.  This test is used\par for clinical purposes.  The laboratory is certified under the\par CLIA-88 as qualified to perform high\par \par \par \par \par \par FABIAN WAKEFIELD                     2\par \par \par \par Hematopathology Report\par \par \par \par \par complexity clinical testing.\par \par Verified by: Blair Rojas M.D.\par (Electronic Signature)\par Reported on: 12/18/19 16:18 EST, [address],\[place] [address]\par Phone: [phone]   Fax: [phone]\par _________________________________________________________________\par \par Clinical History\par N/A\par \par Specimen(s) Submitted\par 1-Left cervical lymph node\par \par Gross Description\par \par The specimen is received fresh and the specimen container is\par labeled: Left cervical lymph node.  It consists of a 2.5 x 2.0 x\par 1.9 cm a bisected lymph node.  The specimen is further sectioned\par and entirely submitted in four cassettes.  Lymphoma workup is\par performed.\par \par In addition to other data that may appear on the specimen\par container, the label has been inspected to confirm the presence\par of the patient's name and date of birth.\par \par Sis Yu 12/13/2019 18:12\par \par  \par \par  Ordered by: CASA SANTOS       Collected/Examined: [id number] 01:39PM       \par Verified by: CASA SANTOS [id number] 01:41PM       \par  Result Communication: No patient communication needed at this time;\par Stage: Final       \par  Performed at: [facility]       Resulted: [id number] 04:18PM       Last Updated: [id number] 01:41PM       Accession: [id number]

## 2020-06-26 ENCOUNTER — RESULT REVIEW (OUTPATIENT)
Age: 25
End: 2020-06-26

## 2020-06-26 ENCOUNTER — APPOINTMENT (OUTPATIENT)
Dept: INFUSION THERAPY | Facility: HOSPITAL | Age: 25
End: 2020-06-26

## 2020-06-26 ENCOUNTER — LABORATORY RESULT (OUTPATIENT)
Age: 25
End: 2020-06-26

## 2020-06-26 LAB
BASOPHILS # BLD AUTO: 0.04 K/UL — SIGNIFICANT CHANGE UP (ref 0–0.2)
BASOPHILS NFR BLD AUTO: 0.6 % — SIGNIFICANT CHANGE UP (ref 0–2)
EOSINOPHIL # BLD AUTO: 0.5 K/UL — SIGNIFICANT CHANGE UP (ref 0–0.5)
EOSINOPHIL NFR BLD AUTO: 7.6 % — HIGH (ref 0–6)
HCT VFR BLD CALC: 37.6 % — SIGNIFICANT CHANGE UP (ref 34.5–45)
HGB BLD-MCNC: 12.8 G/DL — SIGNIFICANT CHANGE UP (ref 11.5–15.5)
IMM GRANULOCYTES NFR BLD AUTO: 0.3 % — SIGNIFICANT CHANGE UP (ref 0–1.5)
LYMPHOCYTES # BLD AUTO: 1.22 K/UL — SIGNIFICANT CHANGE UP (ref 1–3.3)
LYMPHOCYTES # BLD AUTO: 18.6 % — SIGNIFICANT CHANGE UP (ref 13–44)
MCHC RBC-ENTMCNC: 32.7 PG — SIGNIFICANT CHANGE UP (ref 27–34)
MCHC RBC-ENTMCNC: 34 GM/DL — SIGNIFICANT CHANGE UP (ref 32–36)
MCV RBC AUTO: 95.9 FL — SIGNIFICANT CHANGE UP (ref 80–100)
MONOCYTES # BLD AUTO: 0.26 K/UL — SIGNIFICANT CHANGE UP (ref 0–0.9)
MONOCYTES NFR BLD AUTO: 4 % — SIGNIFICANT CHANGE UP (ref 2–14)
NEUTROPHILS # BLD AUTO: 4.51 K/UL — SIGNIFICANT CHANGE UP (ref 1.8–7.4)
NEUTROPHILS NFR BLD AUTO: 68.9 % — SIGNIFICANT CHANGE UP (ref 43–77)
NRBC # BLD: 0 /100 WBCS — SIGNIFICANT CHANGE UP (ref 0–0)
PLATELET # BLD AUTO: 254 K/UL — SIGNIFICANT CHANGE UP (ref 150–400)
RBC # BLD: 3.92 M/UL — SIGNIFICANT CHANGE UP (ref 3.8–5.2)
RBC # FLD: 12.3 % — SIGNIFICANT CHANGE UP (ref 10.3–14.5)
WBC # BLD: 6.55 K/UL — SIGNIFICANT CHANGE UP (ref 3.8–10.5)
WBC # FLD AUTO: 6.55 K/UL — SIGNIFICANT CHANGE UP (ref 3.8–10.5)

## 2020-06-29 ENCOUNTER — OUTPATIENT (OUTPATIENT)
Dept: OUTPATIENT SERVICES | Facility: HOSPITAL | Age: 25
LOS: 1 days | Discharge: HOME | End: 2020-06-29
Payer: COMMERCIAL

## 2020-06-29 PROCEDURE — 77263 THER RADIOLOGY TX PLNG CPLX: CPT

## 2020-07-01 ENCOUNTER — APPOINTMENT (OUTPATIENT)
Dept: RADIATION ONCOLOGY | Facility: CLINIC | Age: 25
End: 2020-07-01
Payer: COMMERCIAL

## 2020-07-01 VITALS
HEIGHT: 71 IN | WEIGHT: 263.23 LBS | OXYGEN SATURATION: 99 % | BODY MASS INDEX: 36.85 KG/M2 | DIASTOLIC BLOOD PRESSURE: 83 MMHG | SYSTOLIC BLOOD PRESSURE: 117 MMHG | RESPIRATION RATE: 17 BRPM | HEART RATE: 73 BPM | TEMPERATURE: 99.14 F

## 2020-07-01 PROCEDURE — 99214 OFFICE O/P EST MOD 30 MIN: CPT | Mod: GC

## 2020-07-01 PROCEDURE — 77333 RADIATION TREATMENT AID(S): CPT | Mod: 26

## 2020-07-02 NOTE — REVIEW OF SYSTEMS
[Gait Disturbance] : no gait disturbance [Muscle Pain] : no muscle pain [Muscle Weakness] : no muscle weakness [Suicidal] : not suicidal [Depression] : no depression [Insomnia] : no insomnia

## 2020-07-02 NOTE — PHYSICAL EXAM
[Outer Ear] : the ears and nose were normal in appearance [Sclera] : the sclera and conjunctiva were normal [] : no respiratory distress [Heart Sounds] : normal S1 and S2 [Nondistended] : nondistended [Abdomen Soft] : soft [Normal] : normal skin color and pigmentation and no rash [de-identified] : no palpable lymphadenopathy

## 2020-07-02 NOTE — HISTORY OF PRESENT ILLNESS
[FreeTextEntry1] : 23 yo female with newly diagnosed Lugano Stage II unfavorable Hodgkins lymphoma involving the bilateral cervical supraclavicular, axilla, mediastinal lymph nodes as well as IMNs\par \par Pt states in Nov 2019 she noticed neck adenopathy, associated with pruritus and skin rash. She went to see her pcp who referred her for ENT evaluation. Excisional node biopsy was done on 12/13/19. Pathology showed classical hodgkin's lymphoma\par \par PET/CT 12/16/19 showed Multiple hypermetabolic bilateral cervical and supraclavicular lymph nodes, more extensive on the left, and difficult to delineate on CT in the absence of intravenous contrast. For reference, confluent left supraclavicular lymphadenopathy measures up to approximately 6.4 x 2.8 cm demonstrates SUV 16.6 (image 56). A difficult to delineate right supraclavicular node demonstrates SUV 14.0 (image 57). \par CHEST: There are hypermetabolic bilateral mediastinal, bilateral axillary, left perihilar, left retropectoral, and right internal mammary lymph nodes. For reference, difficult to delineate subcarinal lymph node demonstrates SUV 4.8 (image 84). A difficult to delineate left perihilar node demonstrates SUV 5.7 (image 86). A right internal mammary lymph node at the level of the 2nd-3rd intercostal space measures 1.5 x 1.5 cm (SUV 5.7; image 83). A left axillary node measures 2.1 x 1.5 cm (SUV 21.9; image 72).\par \par She denies any night sweats but does have some increased warmth. She reports the pruritus prevents her from sleeping. She has a cousin who had a diagnosis of Hodgkins lymphoma at age 27. at diagnosis ESR was 22, \par \par She completed ABVD x 4 and tolerated it well.  PET scan on 5/22/20 showed no PET avid disease. \par Presents today for follow up and CT sim.

## 2020-07-08 PROCEDURE — 77338 DESIGN MLC DEVICE FOR IMRT: CPT | Mod: 26

## 2020-07-08 PROCEDURE — 77301 RADIOTHERAPY DOSE PLAN IMRT: CPT | Mod: 26

## 2020-07-08 PROCEDURE — 77300 RADIATION THERAPY DOSE PLAN: CPT | Mod: 26

## 2020-07-20 PROCEDURE — G6002: CPT | Mod: 26

## 2020-07-21 VITALS
RESPIRATION RATE: 18 BRPM | OXYGEN SATURATION: 99 % | WEIGHT: 269.07 LBS | TEMPERATURE: 205.7 F | SYSTOLIC BLOOD PRESSURE: 106 MMHG | HEART RATE: 53 BPM | DIASTOLIC BLOOD PRESSURE: 73 MMHG | BODY MASS INDEX: 37.53 KG/M2

## 2020-07-21 PROCEDURE — G6002: CPT | Mod: 26

## 2020-07-21 NOTE — REVIEW OF SYSTEMS
[Constipation: Grade 0] : Constipation: Grade 0 [Diarrhea: Grade 0] : Diarrhea: Grade 0 [Dysphagia: Grade 0] : Dysphagia: Grade 0 [Vomiting: Grade 0] : Vomiting: Grade 0 [Nausea: Grade 0] : Nausea: Grade 0 [Cough: Grade 0] : Cough: Grade 0 [Dyspnea: Grade 0] : Dyspnea: Grade 0

## 2020-07-21 NOTE — HISTORY OF PRESENT ILLNESS
[FreeTextEntry1] : 25 yo female with newly diagnosed Lugano Stage II unfavorable Hodgkins lymphoma involving the bilateral cervical supraclavicular, axilla, mediastinal lymph nodes as well as IMNs\par 7/20/2020\par -Started treatment yesterday and tolerating it well\par -scant amt of discomfort to left side of neck ,no meds needed\par -eating well

## 2020-07-21 NOTE — DISEASE MANAGEMENT
[Clinical] : TNM Stage: c [II] : II [TTNM] : 2a [NTNM] : 0 [MTNM] : 0 [de-identified] : 2tx/400cGy [de-identified] : 15tx/3000cGy [de-identified] : left neck

## 2020-07-22 PROCEDURE — G6002: CPT | Mod: 26

## 2020-07-23 PROCEDURE — G6002: CPT | Mod: 26

## 2020-07-24 PROCEDURE — 77427 RADIATION TX MANAGEMENT X5: CPT

## 2020-07-24 PROCEDURE — 77014: CPT | Mod: 26

## 2020-07-27 PROCEDURE — G6002: CPT | Mod: 26

## 2020-07-28 VITALS
RESPIRATION RATE: 18 BRPM | SYSTOLIC BLOOD PRESSURE: 121 MMHG | TEMPERATURE: 99.14 F | HEART RATE: 87 BPM | OXYGEN SATURATION: 100 % | BODY MASS INDEX: 37.54 KG/M2 | WEIGHT: 269.18 LBS | DIASTOLIC BLOOD PRESSURE: 87 MMHG

## 2020-07-28 PROCEDURE — G6002: CPT | Mod: 26

## 2020-07-28 NOTE — VITALS
[Maximal Pain Intensity: 0/10] : 0/10 [80: Normal activity with effort; some signs or symptoms of disease.] : 80: Normal activity with effort; some signs or symptoms of disease.  [ECOG Performance Status: 1 - Restricted in physically strenuous activity but ambulatory and able to carry out work of a light or sedentary nature] : Performance Status: 1 - Restricted in physically strenuous activity but ambulatory and able to carry out work of a light or sedentary nature, e.g., light house work, office work

## 2020-07-28 NOTE — REVIEW OF SYSTEMS
[Constipation: Grade 0] : Constipation: Grade 0 [Diarrhea: Grade 0] : Diarrhea: Grade 0 [Dysphagia: Grade 0] : Dysphagia: Grade 0 [Nausea: Grade 0] : Nausea: Grade 0 [Vomiting: Grade 0] : Vomiting: Grade 0 [Cough: Grade 0] : Cough: Grade 0 [Dyspnea: Grade 0] : Dyspnea: Grade 0

## 2020-07-28 NOTE — HISTORY OF PRESENT ILLNESS
[FreeTextEntry1] : 25 yo female with newly diagnosed Lugano Stage II unfavorable Hodgkins lymphoma involving the bilateral cervical supraclavicular, axilla, mediastinal lymph nodes as well as IMNs\par 7/20/2020\par -Started treatment yesterday and tolerating it well\par -scant amt of discomfort to left side of neck ,no meds needed\par -eating well\par \par 7/28/2020\par -Tolerating tx\par -no complaints of pain\par -maintaining wt\par

## 2020-07-28 NOTE — DISEASE MANAGEMENT
[Clinical] : TNM Stage: c [II] : II [TTNM] : 2a [MTNM] : 0 [NTNM] : 0 [de-identified] : 15tx/3000cGy [de-identified] : 7tx/1400cGy [de-identified] : left neck

## 2020-07-29 PROCEDURE — G6002: CPT | Mod: 26

## 2020-07-30 PROCEDURE — G6002: CPT | Mod: 26

## 2020-07-31 PROCEDURE — 77427 RADIATION TX MANAGEMENT X5: CPT

## 2020-07-31 PROCEDURE — 77014: CPT | Mod: 26

## 2020-08-03 PROCEDURE — G6002: CPT | Mod: 26

## 2020-08-05 PROCEDURE — G6002: CPT | Mod: 26

## 2020-08-05 NOTE — DISEASE MANAGEMENT
[Clinical] : TNM Stage: c [II] : II [TTNM] : 2a [NTNM] : 0 [de-identified] : 12tx/240cGy [MTNM] : 0 [de-identified] : left neck [de-identified] : 15tx/3000cGy

## 2020-08-05 NOTE — HISTORY OF PRESENT ILLNESS
[FreeTextEntry1] : 23 yo female with newly diagnosed Lugano Stage II unfavorable Hodgkins lymphoma involving the bilateral cervical supraclavicular, axilla, mediastinal lymph nodes as well as IMNs\par 7/20/2020\par -Started treatment yesterday and tolerating it well\par -scant amt of discomfort to left side of neck ,no meds needed\par -eating well\par \par 7/28/2020\par -Tolerating tx\par -no complaints of pain\par -maintaining wt\par \par 8/5/20 12/15 Fx\par Using aquaphor. No skin reaction noted\par

## 2020-08-05 NOTE — REVIEW OF SYSTEMS
[Constipation: Grade 0] : Constipation: Grade 0 [Nausea: Grade 0] : Nausea: Grade 0 [Diarrhea: Grade 0] : Diarrhea: Grade 0 [Dysphagia: Grade 0] : Dysphagia: Grade 0 [Vomiting: Grade 0] : Vomiting: Grade 0 [Cough: Grade 0] : Cough: Grade 0 [Dyspnea: Grade 0] : Dyspnea: Grade 0

## 2020-08-06 PROCEDURE — 77014: CPT | Mod: 26

## 2020-08-07 PROCEDURE — 77014: CPT | Mod: 26

## 2020-08-10 PROCEDURE — G6002: CPT | Mod: 26

## 2020-08-10 PROCEDURE — 77427 RADIATION TX MANAGEMENT X5: CPT

## 2020-08-11 ENCOUNTER — TRANSCRIPTION ENCOUNTER (OUTPATIENT)
Age: 25
End: 2020-08-11

## 2020-08-15 ENCOUNTER — OUTPATIENT (OUTPATIENT)
Dept: OUTPATIENT SERVICES | Facility: HOSPITAL | Age: 25
LOS: 1 days | Discharge: HOME | End: 2020-08-15

## 2020-08-15 DIAGNOSIS — C81.70 OTHER HODGKIN LYMPHOMA, UNSPECIFIED SITE: ICD-10-CM

## 2020-08-21 ENCOUNTER — RESULT REVIEW (OUTPATIENT)
Age: 25
End: 2020-08-21

## 2020-08-21 ENCOUNTER — APPOINTMENT (OUTPATIENT)
Dept: INFUSION THERAPY | Facility: HOSPITAL | Age: 25
End: 2020-08-21

## 2020-08-21 ENCOUNTER — LABORATORY RESULT (OUTPATIENT)
Age: 25
End: 2020-08-21

## 2020-08-21 LAB
BASOPHILS # BLD AUTO: 0.01 K/UL — SIGNIFICANT CHANGE UP (ref 0–0.2)
BASOPHILS NFR BLD AUTO: 0.2 % — SIGNIFICANT CHANGE UP (ref 0–2)
EOSINOPHIL # BLD AUTO: 0.07 K/UL — SIGNIFICANT CHANGE UP (ref 0–0.5)
EOSINOPHIL NFR BLD AUTO: 1.6 % — SIGNIFICANT CHANGE UP (ref 0–6)
HCT VFR BLD CALC: 33.7 % — LOW (ref 34.5–45)
HGB BLD-MCNC: 11.5 G/DL — SIGNIFICANT CHANGE UP (ref 11.5–15.5)
IMM GRANULOCYTES NFR BLD AUTO: 0.2 % — SIGNIFICANT CHANGE UP (ref 0–1.5)
LYMPHOCYTES # BLD AUTO: 0.48 K/UL — LOW (ref 1–3.3)
LYMPHOCYTES # BLD AUTO: 10.7 % — LOW (ref 13–44)
MCHC RBC-ENTMCNC: 31.9 PG — SIGNIFICANT CHANGE UP (ref 27–34)
MCHC RBC-ENTMCNC: 34.1 GM/DL — SIGNIFICANT CHANGE UP (ref 32–36)
MCV RBC AUTO: 93.6 FL — SIGNIFICANT CHANGE UP (ref 80–100)
MONOCYTES # BLD AUTO: 0.27 K/UL — SIGNIFICANT CHANGE UP (ref 0–0.9)
MONOCYTES NFR BLD AUTO: 6 % — SIGNIFICANT CHANGE UP (ref 2–14)
NEUTROPHILS # BLD AUTO: 3.65 K/UL — SIGNIFICANT CHANGE UP (ref 1.8–7.4)
NEUTROPHILS NFR BLD AUTO: 81.3 % — HIGH (ref 43–77)
NRBC # BLD: 0 /100 WBCS — SIGNIFICANT CHANGE UP (ref 0–0)
PLATELET # BLD AUTO: 207 K/UL — SIGNIFICANT CHANGE UP (ref 150–400)
RBC # BLD: 3.6 M/UL — LOW (ref 3.8–5.2)
RBC # FLD: 12.3 % — SIGNIFICANT CHANGE UP (ref 10.3–14.5)
WBC # BLD: 4.49 K/UL — SIGNIFICANT CHANGE UP (ref 3.8–10.5)
WBC # FLD AUTO: 4.49 K/UL — SIGNIFICANT CHANGE UP (ref 3.8–10.5)

## 2020-08-24 DIAGNOSIS — R11.2 NAUSEA WITH VOMITING, UNSPECIFIED: ICD-10-CM

## 2020-09-17 ENCOUNTER — APPOINTMENT (OUTPATIENT)
Dept: RADIATION ONCOLOGY | Facility: CLINIC | Age: 25
End: 2020-09-17
Payer: COMMERCIAL

## 2020-09-17 VITALS
DIASTOLIC BLOOD PRESSURE: 81 MMHG | HEART RATE: 84 BPM | OXYGEN SATURATION: 100 % | SYSTOLIC BLOOD PRESSURE: 129 MMHG | RESPIRATION RATE: 15 BRPM | BODY MASS INDEX: 36.24 KG/M2 | WEIGHT: 259.81 LBS

## 2020-09-17 PROCEDURE — 99024 POSTOP FOLLOW-UP VISIT: CPT | Mod: GC

## 2020-09-17 NOTE — REVIEW OF SYSTEMS
[Negative] : Integumentary [Fatigue: Grade 0] : Fatigue: Grade 0 [Xerostomia: Grade 0] : Xerostomia: Grade 0 [Dysgeusia: Grade 0] : Dysgeusia: Grade 0

## 2020-09-17 NOTE — PHYSICAL EXAM
[Sclera] : the sclera and conjunctiva were normal [Extraocular Movements] : extraocular movements were intact [Cervical Lymph Nodes Enlarged Posterior Bilaterally] : posterior cervical [Cervical Lymph Nodes Enlarged Anterior Bilaterally] : anterior cervical [Supraclavicular Lymph Nodes Enlarged Bilaterally] : supraclavicular [Axillary Lymph Nodes Enlarged Bilaterally] : axillary [Normal] : oriented to person, place and time, the affect was normal, the mood was normal and not anxious

## 2020-09-18 NOTE — HISTORY OF PRESENT ILLNESS
[FreeTextEntry1] : Ms Concepcion is a 25 yo female with newly diagnosed Lugano Stage II unfavorable Hodgkins lymphoma involving the bilateral cervical supraclavicular, axilla, mediastinal lymph nodes as well as IMNs. s/p 4 cycles ABVD in May 2020 with Dr Franko Mayer\par  She completed 3000 cGy radiation therapy to the left neck on 8/10/2020\par \par Returns for post treatment evaluation. \par \par The patient is doing well with no complaints.Reports dysgeusia and fatigue experienced during treatment is resolved. She had her port flushed 2 weeks ago.

## 2020-09-18 NOTE — DISEASE MANAGEMENT
[Clinical] : TNM Stage: c [II] : II [TTNM] : 2a [NTNM] : 0 [MTNM] : 0 [de-identified] : 3000 cGy [de-identified] : Left Neck

## 2020-09-30 ENCOUNTER — TRANSCRIPTION ENCOUNTER (OUTPATIENT)
Age: 25
End: 2020-09-30

## 2020-10-01 ENCOUNTER — OUTPATIENT (OUTPATIENT)
Dept: OUTPATIENT SERVICES | Facility: HOSPITAL | Age: 25
LOS: 1 days | Discharge: HOME | End: 2020-10-01

## 2020-10-01 DIAGNOSIS — C81.70 OTHER HODGKIN LYMPHOMA, UNSPECIFIED SITE: ICD-10-CM

## 2020-10-02 ENCOUNTER — LABORATORY RESULT (OUTPATIENT)
Age: 25
End: 2020-10-02

## 2020-10-02 ENCOUNTER — RESULT REVIEW (OUTPATIENT)
Age: 25
End: 2020-10-02

## 2020-10-02 ENCOUNTER — APPOINTMENT (OUTPATIENT)
Dept: INFUSION THERAPY | Facility: HOSPITAL | Age: 25
End: 2020-10-02

## 2020-10-02 ENCOUNTER — OUTPATIENT (OUTPATIENT)
Dept: OUTPATIENT SERVICES | Facility: HOSPITAL | Age: 25
LOS: 1 days | End: 2020-10-02
Payer: COMMERCIAL

## 2020-10-02 ENCOUNTER — APPOINTMENT (OUTPATIENT)
Dept: CT IMAGING | Facility: IMAGING CENTER | Age: 25
End: 2020-10-02
Payer: COMMERCIAL

## 2020-10-02 DIAGNOSIS — C81.90 HODGKIN LYMPHOMA, UNSPECIFIED, UNSPECIFIED SITE: ICD-10-CM

## 2020-10-02 DIAGNOSIS — Z00.8 ENCOUNTER FOR OTHER GENERAL EXAMINATION: ICD-10-CM

## 2020-10-02 LAB
BASOPHILS # BLD AUTO: 0.01 K/UL — SIGNIFICANT CHANGE UP (ref 0–0.2)
BASOPHILS NFR BLD AUTO: 0.1 % — SIGNIFICANT CHANGE UP (ref 0–2)
EOSINOPHIL # BLD AUTO: 0.04 K/UL — SIGNIFICANT CHANGE UP (ref 0–0.5)
EOSINOPHIL NFR BLD AUTO: 0.6 % — SIGNIFICANT CHANGE UP (ref 0–6)
HCT VFR BLD CALC: 37.3 % — SIGNIFICANT CHANGE UP (ref 34.5–45)
HGB BLD-MCNC: 12.5 G/DL — SIGNIFICANT CHANGE UP (ref 11.5–15.5)
IMM GRANULOCYTES NFR BLD AUTO: 0.4 % — SIGNIFICANT CHANGE UP (ref 0–1.5)
LYMPHOCYTES # BLD AUTO: 0.6 K/UL — LOW (ref 1–3.3)
LYMPHOCYTES # BLD AUTO: 8.9 % — LOW (ref 13–44)
MCHC RBC-ENTMCNC: 32.6 PG — SIGNIFICANT CHANGE UP (ref 27–34)
MCHC RBC-ENTMCNC: 33.5 G/DL — SIGNIFICANT CHANGE UP (ref 32–36)
MCV RBC AUTO: 97.1 FL — SIGNIFICANT CHANGE UP (ref 80–100)
MONOCYTES # BLD AUTO: 0.3 K/UL — SIGNIFICANT CHANGE UP (ref 0–0.9)
MONOCYTES NFR BLD AUTO: 4.5 % — SIGNIFICANT CHANGE UP (ref 2–14)
NEUTROPHILS # BLD AUTO: 5.75 K/UL — SIGNIFICANT CHANGE UP (ref 1.8–7.4)
NEUTROPHILS NFR BLD AUTO: 85.5 % — HIGH (ref 43–77)
NRBC # BLD: 0 /100 WBCS — SIGNIFICANT CHANGE UP (ref 0–0)
PLATELET # BLD AUTO: 233 K/UL — SIGNIFICANT CHANGE UP (ref 150–400)
RBC # BLD: 3.84 M/UL — SIGNIFICANT CHANGE UP (ref 3.8–5.2)
RBC # FLD: 13 % — SIGNIFICANT CHANGE UP (ref 10.3–14.5)
WBC # BLD: 6.73 K/UL — SIGNIFICANT CHANGE UP (ref 3.8–10.5)
WBC # FLD AUTO: 6.73 K/UL — SIGNIFICANT CHANGE UP (ref 3.8–10.5)

## 2020-10-02 PROCEDURE — 71260 CT THORAX DX C+: CPT

## 2020-10-02 PROCEDURE — 71260 CT THORAX DX C+: CPT | Mod: 26

## 2020-10-02 PROCEDURE — 74177 CT ABD & PELVIS W/CONTRAST: CPT

## 2020-10-02 PROCEDURE — 74177 CT ABD & PELVIS W/CONTRAST: CPT | Mod: 26

## 2020-11-06 ENCOUNTER — RESULT REVIEW (OUTPATIENT)
Age: 25
End: 2020-11-06

## 2020-11-06 ENCOUNTER — APPOINTMENT (OUTPATIENT)
Dept: NUCLEAR MEDICINE | Facility: IMAGING CENTER | Age: 25
End: 2020-11-06

## 2020-11-06 ENCOUNTER — OUTPATIENT (OUTPATIENT)
Dept: OUTPATIENT SERVICES | Facility: HOSPITAL | Age: 25
LOS: 1 days | End: 2020-11-06
Payer: COMMERCIAL

## 2020-11-06 DIAGNOSIS — C81.90 HODGKIN LYMPHOMA, UNSPECIFIED, UNSPECIFIED SITE: ICD-10-CM

## 2020-11-06 PROCEDURE — 78815 PET IMAGE W/CT SKULL-THIGH: CPT | Mod: 26,PS

## 2020-11-06 PROCEDURE — A9552: CPT

## 2020-11-06 PROCEDURE — 78815 PET IMAGE W/CT SKULL-THIGH: CPT

## 2020-11-12 ENCOUNTER — APPOINTMENT (OUTPATIENT)
Dept: RADIATION ONCOLOGY | Facility: CLINIC | Age: 25
End: 2020-11-12
Payer: COMMERCIAL

## 2020-11-12 VITALS
OXYGEN SATURATION: 96 % | TEMPERATURE: 97.8 F | BODY MASS INDEX: 36.04 KG/M2 | HEART RATE: 67 BPM | SYSTOLIC BLOOD PRESSURE: 124 MMHG | WEIGHT: 258.38 LBS | RESPIRATION RATE: 15 BRPM | DIASTOLIC BLOOD PRESSURE: 79 MMHG

## 2020-11-12 PROCEDURE — 99214 OFFICE O/P EST MOD 30 MIN: CPT

## 2020-11-12 PROCEDURE — 99072 ADDL SUPL MATRL&STAF TM PHE: CPT

## 2020-11-12 NOTE — REVIEW OF SYSTEMS
[Dysphagia: Grade 0] : Dysphagia: Grade 0 [Fatigue: Grade 1 - Fatigue relieved by rest] : Fatigue: Grade 1 - Fatigue relieved by rest [Mucositis Oral: Grade 0] : Mucositis Oral: Grade 0  [Xerostomia: Grade 0] : Xerostomia: Grade 0 [Oral Pain: Grade 0] : Oral Pain: Grade 0 [Dysgeusia: Grade 0] : Dysgeusia: Grade 0 [Skin Hyperpigmentation: Grade 0] : Skin Hyperpigmentation: Grade 0 [Dermatitis Radiation: Grade 0] : Dermatitis Radiation: Grade 0

## 2020-11-14 ENCOUNTER — OUTPATIENT (OUTPATIENT)
Dept: OUTPATIENT SERVICES | Facility: HOSPITAL | Age: 25
LOS: 1 days | Discharge: HOME | End: 2020-11-14

## 2020-11-14 DIAGNOSIS — C81.70 OTHER HODGKIN LYMPHOMA, UNSPECIFIED SITE: ICD-10-CM

## 2020-11-16 NOTE — DISEASE MANAGEMENT
[Clinical] : TNM Stage: c [II] : II [TTNM] : 2a [NTNM] : 0 [MTNM] : 0 [de-identified] : 3000 cGy [de-identified] : Left Neck-HODGKINS

## 2020-11-16 NOTE — HISTORY OF PRESENT ILLNESS
[FreeTextEntry1] : Ms Concepcion is a 25 yo female with newly diagnosed Lugano Stage II unfavorable Hodgkins lymphoma involving the bilateral cervical supraclavicular, axilla, mediastinal lymph nodes as well as IMNs. s/p 4 cycles ABVD in May 2020 with Dr Franko Mayer\par  She completed 3000 cGy radiation therapy to the left neck on 8/10/2020\par \par Returns for F/U. CT CAP 10/5/20 showed no adenopathy, \par \par PET/CT 11/5/20: 1. No evidence of FDG-avid disease. No significant change as compared to FDG-PET/CT dated 5/22/2020.\par \par 2. Near total resolution of bilateral parenchymal pulmonary opacities seen on CT chest dated 10/2/2020, compatible with resolving infection.\par \par She's doing well after treatment, asymptomatic from chemoRT without new LAD or other signs of recurrrence

## 2020-11-20 ENCOUNTER — RESULT REVIEW (OUTPATIENT)
Age: 25
End: 2020-11-20

## 2020-11-20 ENCOUNTER — APPOINTMENT (OUTPATIENT)
Dept: INFUSION THERAPY | Facility: HOSPITAL | Age: 25
End: 2020-11-20

## 2020-11-20 ENCOUNTER — LABORATORY RESULT (OUTPATIENT)
Age: 25
End: 2020-11-20

## 2020-11-20 LAB
BASOPHILS # BLD AUTO: 0.04 K/UL — SIGNIFICANT CHANGE UP (ref 0–0.2)
BASOPHILS NFR BLD AUTO: 0.8 % — SIGNIFICANT CHANGE UP (ref 0–2)
EOSINOPHIL # BLD AUTO: 0.07 K/UL — SIGNIFICANT CHANGE UP (ref 0–0.5)
EOSINOPHIL NFR BLD AUTO: 1.3 % — SIGNIFICANT CHANGE UP (ref 0–6)
HCT VFR BLD CALC: 41.4 % — SIGNIFICANT CHANGE UP (ref 34.5–45)
HGB BLD-MCNC: 13.8 G/DL — SIGNIFICANT CHANGE UP (ref 11.5–15.5)
IMM GRANULOCYTES NFR BLD AUTO: 0.4 % — SIGNIFICANT CHANGE UP (ref 0–1.5)
LYMPHOCYTES # BLD AUTO: 0.77 K/UL — LOW (ref 1–3.3)
LYMPHOCYTES # BLD AUTO: 14.8 % — SIGNIFICANT CHANGE UP (ref 13–44)
MCHC RBC-ENTMCNC: 32.8 PG — SIGNIFICANT CHANGE UP (ref 27–34)
MCHC RBC-ENTMCNC: 33.3 G/DL — SIGNIFICANT CHANGE UP (ref 32–36)
MCV RBC AUTO: 98.3 FL — SIGNIFICANT CHANGE UP (ref 80–100)
MONOCYTES # BLD AUTO: 0.31 K/UL — SIGNIFICANT CHANGE UP (ref 0–0.9)
MONOCYTES NFR BLD AUTO: 6 % — SIGNIFICANT CHANGE UP (ref 2–14)
NEUTROPHILS # BLD AUTO: 3.98 K/UL — SIGNIFICANT CHANGE UP (ref 1.8–7.4)
NEUTROPHILS NFR BLD AUTO: 76.7 % — SIGNIFICANT CHANGE UP (ref 43–77)
NRBC # BLD: 0 /100 WBCS — SIGNIFICANT CHANGE UP (ref 0–0)
PLATELET # BLD AUTO: 275 K/UL — SIGNIFICANT CHANGE UP (ref 150–400)
RBC # BLD: 4.21 M/UL — SIGNIFICANT CHANGE UP (ref 3.8–5.2)
RBC # FLD: 11.9 % — SIGNIFICANT CHANGE UP (ref 10.3–14.5)
WBC # BLD: 5.19 K/UL — SIGNIFICANT CHANGE UP (ref 3.8–10.5)
WBC # FLD AUTO: 5.19 K/UL — SIGNIFICANT CHANGE UP (ref 3.8–10.5)

## 2020-12-09 ENCOUNTER — APPOINTMENT (OUTPATIENT)
Dept: OBGYN | Facility: CLINIC | Age: 25
End: 2020-12-09
Payer: COMMERCIAL

## 2020-12-09 VITALS
DIASTOLIC BLOOD PRESSURE: 79 MMHG | WEIGHT: 255 LBS | HEIGHT: 71 IN | SYSTOLIC BLOOD PRESSURE: 114 MMHG | BODY MASS INDEX: 35.7 KG/M2

## 2020-12-09 DIAGNOSIS — Z01.419 ENCOUNTER FOR GYNECOLOGICAL EXAMINATION (GENERAL) (ROUTINE) W/OUT ABNORMAL FINDINGS: ICD-10-CM

## 2020-12-09 PROCEDURE — 99072 ADDL SUPL MATRL&STAF TM PHE: CPT

## 2020-12-09 PROCEDURE — 99395 PREV VISIT EST AGE 18-39: CPT

## 2020-12-14 ENCOUNTER — OUTPATIENT (OUTPATIENT)
Dept: OUTPATIENT SERVICES | Facility: HOSPITAL | Age: 25
LOS: 1 days | Discharge: HOME | End: 2020-12-14

## 2020-12-14 DIAGNOSIS — C81.70 OTHER HODGKIN LYMPHOMA, UNSPECIFIED SITE: ICD-10-CM

## 2020-12-14 LAB — CYTOLOGY CVX/VAG DOC THIN PREP: ABNORMAL

## 2020-12-17 ENCOUNTER — APPOINTMENT (OUTPATIENT)
Dept: HEMATOLOGY ONCOLOGY | Facility: CLINIC | Age: 25
End: 2020-12-17
Payer: COMMERCIAL

## 2020-12-17 PROCEDURE — 99214 OFFICE O/P EST MOD 30 MIN: CPT | Mod: 95

## 2020-12-19 NOTE — HISTORY OF PRESENT ILLNESS
[Disease:__________________________] : Disease: [unfilled] [Treatment Protocol] : Treatment Protocol [Cycle: ___] : Cycle: [unfilled] [Home] : at home, [unfilled] , at the time of the visit. [Medical Office: ([facility])___] : at the medical office located in  [Verbal consent obtained from patient] : the patient, [unfilled] [de-identified] : Dec 24, 2019\par 25 yo female with newly diagnosed Hodgkins lymphoma. Pt states in Nov 2019 she noticed neck adenopathy, associated with pruritus and skin rash. She went to see her pcp who referred her for ENT evaluation. Excisional node biopsy was done which was c/w Hodgkins lymphoma. Pt also had PET/CT done which showed adenopathy in chest, neck and supraclavicular as well as axillary areas. She denies any night sweats but does have some increased warmth. She reports the pruritus prevents her from sleeping. She has a cousin who had a diagnosis of Hodgkins lymphoma at age 27. Pt is here today with her live-in boyfriend, close family friend and mother. She is tearful and concerned about effects of chemotherapy. She has some information about diagnosis and treatment already - based on her own review. She is eager to start treatment.  [de-identified] : II [de-identified] : \par  Pathology             Final\par \par No Documents Attached\par \par \par \par \par   [facility] Accession Number : [id number]\par \par FABIAN WAKEFIELD                     2\par \par \par \par Hematopathology Report\par \par \par \par \par Final Diagnosis\par _\par 1.  Lymph node, cervical, left:\par - Classical Hodgkin's lymphoma\par \par See note and description.\par \par Diagnostic note:\par The histologic and immunohistochemical findings are diagnostic of\par Classical Hodgkin's Lymphoma.\par \par Microscopic description:\par Histologic sections show a lymph node with completely effaced\par lyubov architecture. The large Hodgkin/Forrest-Eduar (HRS) cells\par and smudge cells are identified in the back ground of extensive\par necrosis as well as mixed inflammatory infiltrate including\par eosinophils, neutrophils and plasma cells.\par \par Immunohistochemical stains for CD30, CD15, PAX5, CD20, CD3, MUM1,\par EBV encoded RNA (ANNALISA) in situ hybridization, CD79a, CD45 stains\par performed on formalin fixed paraffin embedded tissue, block 1A.\par \par Neoplastic cells are:  Hodgkin/Forrest-Eduar (HRS) cells\par Positive:  CD30, CD15, PAX-5 (weak), MUM1\par Negative:  CD45, CD79a, CD20, CD3, ANNALISA\par \par Additional stains in progress an addendum to follow\par \par Flow cytometry analysis:\par Lymphocytes (52% of cells): Heterogeneous population of T-cells\par (with normal CD4 to CD8 ratio), natural killer cells, and\par polytypic B-cells. The lymphocyte immunophenotypic findings show\par no diagnostic abnormalities.\par \par Slide(s) with built in immunohistochemical study control(s)\par associated and negative control with this case has/have been\par verified by the sign out pathologist.\par For slide(s) without built in controls positive control slides\par has/have been reviewed and approved by immunohistochemistry lab\par These immunohistochemical/ in-situ hybridization tests have been\par developed and their performance characteristics determined by\par [facility], Department of\par Pathology, Division of Immunopathology, [address] [place] [address]\[place] [address].  It has not been cleared or approved by the\par U.S. Food and Drug Administration.  The FDA has determined that\par such clearance or approval is not necessary.  This test is used\par for clinical purposes.  The laboratory is certified under the\par CLIA-88 as qualified to perform high\par \par \par \par \par \par FABIAN WAKEFIELD                     2\par \par \par \par Hematopathology Report\par \par \par \par \par complexity clinical testing.\par \par Verified by: Blair Rojas M.D.\par (Electronic Signature)\par Reported on: 12/18/19 16:18 EST, [address],\[place] [address]\par Phone: [phone]   Fax: [phone]\par _________________________________________________________________\par \par Clinical History\par N/A\par \par Specimen(s) Submitted\par 1-Left cervical lymph node\par \par Gross Description\par \par The specimen is received fresh and the specimen container is\par labeled: Left cervical lymph node.  It consists of a 2.5 x 2.0 x\par 1.9 cm a bisected lymph node.  The specimen is further sectioned\par and entirely submitted in four cassettes.  Lymphoma workup is\par performed.\par \par In addition to other data that may appear on the specimen\par container, the label has been inspected to confirm the presence\par of the patient's name and date of birth.\par \par Sis Yu 12/13/2019 18:12\par \par  \par \par  Ordered by: CASA SANTOS       Collected/Examined: [id number] 01:39PM       \par Verified by: CASA SANTOS [id number] 01:41PM       \par  Result Communication: No patient communication needed at this time;\par Stage: Final       \par  Performed at: [facility]       Resulted: [id number] 04:18PM       Last Updated: [id number] 01:41PM       Accession: [id number]        [FreeTextEntry1] : ABVD regimen s/p 4 cycles, completed May 2020 [de-identified] : Feb 13, 2020morris Farrar is here with her boyfriend. She has tolerated cycle 1 of chemo well. Main complaints have been acute nausea and diaphoresis within 24hr of chemo. She reports the initial episode was significant, however since then it has been less intense. She has had a 1 week delay with dose 2 of cycle 1 due to neutropenia, and has now started on neulasta post chemo. She has had some bone pain with the neulasta and is asking whether this medication will be required with each chemo. Otherwise she is doing well. \par ------------------------------\par April 16, 2020\sascha Pt reports good tolerance to chemo. SHe has had significant alopecia, and reports scalp sensitivity, she also has acid reflux, mild neuropathy in hands, otherwise she is doing well. She denies any fever or chills, no other acute symptoms. She completed the PET scan as scheduled during the interim.. \par ------------------------------\par Jun 22, 2020morris Farrar is here for scheduled f/u appt. She was seen by Dr Delgado in the interim. She is agreeable to completing RT as recommended. She has questions regarding planned follow-up in the future, when to remove the mediport, when will she feel more normal and have less fatigue, whether she can exercise regularly...\par -----------------------------\sascha Pt is doing well. She completed RT as scheduled, interim CT and PET showed no relapsed disease. She has questions regarding surveillance mammogram and echo given her RT exposure.

## 2020-12-19 NOTE — RESULTS/DATA
[FreeTextEntry1] : PET 5/22/2020\par No residual PET avid disease noted on imaging.\par \par \par  Pathology             Final\par \par No Documents Attached\par \par \par \par \par   [facility] Accession Number : [id number]\par \par FABIAN WAKEFIELD                     3\par \par \par \par Hematopathology Report\par \par \par \par \par Final Diagnosis\par 1, 2. Bone marrow biopsy and bone marrow aspirate\par - Normocellular marrow with maturing trilineage\par hematopoiesis\par - No morphologic or immunophenotypic evidence supporting\par involvement by lymphoma\par See note and description.\par \par Diagnostic note: Per chart review, patient has a recent diagnosis\par of Hodgkin Lymphoma. Current biopsy shows normocellular marrow\par with maturing trilineage hematopoiesis. There is no morphologic\par or immunophenotypic evidence supporting involvement by lymphoma\par Comprehensive report with results of pending ancillary studies to\par follow.\par \par Ancillary studies\par Special stains:\par Bone marrow aspirate iron stain: Iron stores are present. There\par is no increase in ring\par sideroblasts\par \par Flow cytometry (10-FL-):  Lymphocytes (3% of cells):\par Heterogeneous population of T-cells (with normal CD4 to CD8\par ratio), natural killer cells, and polytypic B-cells.\par \par Cytogenetics:  In process, will be reported as an addendum\par \par Microscopic description:\par 1. Biopsy:\par Quality:   Adequate\par Cellularity:    60-70%\par Myeloid lineage:     Normal in number, exhibits full maturation\par Erythroid lineage:  Normal in number, exhibits full maturation\par M: E Within normal limits\par Megakaryocytes:     Normal in number, unremarkable morphology\par Blasts:   Not increased\par Lymphocytes    Not increased in number, mostly small mature\par looking lymphocytes are seen\par Plasma cells:  Not increased\par Stroma:   Unremarkable, iron stores are present\par Clot:     Marrow fragments with similar morphology as the core\par biopsy\par \par 2. Aspirate:\par Quality / cellularity:    Spicular and cellular aspirates\par Granulocytes:  Normal in number, exhibit full maturation, no\par significant dysplastic changes seen\par \par \par \par \par \par \par FABIAN WAKEFIELD                     3\par \par \par \par Hematopathology Report\par \par \par \par \par Erythrocytes:        Normal in number, exhibit full maturation,\par no significant dysplastic\par changes seen\par M: E ratio:          Within normal limits\par Megakaryocytes:      Normal in number, unremarkable morphology\par Lymphocytes:   Not increased\par Plasma cells:        Not increased\par Blasts:         Not increased\par \par Bone Marrow Aspirate Smear Differential Cell Count:  400 Cells.\par Type            %    Normal*   Type           %    Normal*\par Blast                0%   0-3\par Promyelocyte         6%   1-8       Pronormoblast  3%   0-2\par Myelocyte       9%   10-15          Normoblast     17%  15-25\par Metamyelocyte   9%   10-15          Monocyte  3%   0-2\par Band/Neutrophil      30%  12-25          Lymphocyte     19%  10-\par 15\par Eosinophil           3%   1-5       Plasma cell    0%   0-1\par Basophil        1%   0-1\par *Adult Range\par \par Verified by: Hua Chaney MD\par (Electronic Signature)\par Reported on: 12/27/19 10:22 EST, [address],\[place] [address]\par Phone: [phone]   Fax: [phone]\par _________________________________________________________________\par \par Clinical History\par Cervical lymphadenopathy suspected Hodgkin's lymphoma\par \par Specimen(s) Submitted\par 1     Bone marrow biopsy\par 2     Bone marrow aspirate\par \par Gross Description\par 1. The specimen is received in bouin's fixative and the specimen\par container is labeled: Bone marrow.  It consists of a 1.4 x 0.2 cm\par bone marrow core with a 1.5 x 1.3 x 0.2 cm aggregate of blood\par clot.  Entirely submitted.  Two cassettes: A = bone marrow core;\par B = blood clot.\par \par 2. Two Lyle-Giemsa stained bone marrow aspirate smears are\par submitted\par \par In addition to other data that may appear on the specimen\par container, the label has been inspected to confirm the presence\par of the patient's name and date of birth.\par \par \par \par \par \par ASHUFABIAN                     3\par \par \par \par Hematopathology Report\par \par \par \par \par \par KAMRON Salamanca [facility] 12/24/2019 18:33\par \par  \par \par  Ordered by: RUKHSANA MARIN       Collected/Examined: [id number] 12:00PM       \par Verified by: RUKHSANA MARIN [id number] 11:24AM       \par  Result Communication: No patient communication needed at this time;\par Stage: Final       \par  Performed at: [facility]       Resulted: [id number] 10:22AM       Last Updated: 27Dec2019 11:24AM       Accession: [id number]       \par  Pathology             Final\par \par No Documents Attached\par \par \par \par \par   [facility] Accession Number : [id number]\par \par WAKEFIELDFABIAN PURDY                     2\par \par \par \par Hematopathology Report\par \par \par \par \par Final Diagnosis\par _\par 1.  Lymph node, cervical, left:\par - Classical Hodgkin's lymphoma\par \par See note and description.\par \par Diagnostic note:\par The histologic and immunohistochemical findings are diagnostic of\par Classical Hodgkin's Lymphoma.\par \par Microscopic description:\par Histologic sections show a lymph node with completely effaced\par lyubov architecture. The large Hodgkin/Forrest-Eduar (HRS) cells\par and smudge cells are identified in the back ground of extensive\par necrosis as well as mixed inflammatory infiltrate including\par eosinophils, neutrophils and plasma cells.\par \par Immunohistochemical stains for CD30, CD15, PAX5, CD20, CD3, MUM1,\par EBV encoded RNA (ANNALISA) in situ hybridization, CD79a, CD45 stains\par performed on formalin fixed paraffin embedded tissue, block 1A.\par \par Neoplastic cells are:  Hodgkin/Forrest-Eduar (HRS) cells\par Positive:  CD30, CD15, PAX-5 (weak), MUM1\par Negative:  CD45, CD79a, CD20, CD3, ANNALISA\par \par Additional stains in progress an addendum to follow\par \par Flow cytometry analysis:\par Lymphocytes (52% of cells): Heterogeneous population of T-cells\par (with normal CD4 to CD8 ratio), natural killer cells, and\par polytypic B-cells. The lymphocyte immunophenotypic findings show\par no diagnostic abnormalities.\par \par Slide(s) with built in immunohistochemical study control(s)\par associated and negative control with this case has/have been\par verified by the sign out pathologist.\par For slide(s) without built in controls positive control slides\par has/have been reviewed and approved by immunohistochemistry lab\par These immunohistochemical/ in-situ hybridization tests have been\par developed and their performance characteristics determined by\par [facility] / [facility], Department of\par Pathology, Division of Immunopathology, [address] [place] [address]\[place] [address].  It has not been cleared or approved by the\par U.S. Food and Drug Administration.  The FDA has determined that\par such clearance or approval is not necessary.  This test is used\par for clinical purposes.  The laboratory is certified under the\par CLIA-88 as qualified to perform high\par \par \par \par \par \par FABIAN WAKEFIELD                     2\par \par \par \par Hematopathology Report\par \par \par \par \par complexity clinical testing.\par \par Verified by: Blair Rojas M.D.\par (Electronic Signature)\par Reported on: 12/18/19 16:18 EST, [address],\[place] [address]\par Phone: [phone]   Fax: [phone]\par _________________________________________________________________\par \par Clinical History\par N/A\par \par Specimen(s) Submitted\par 1-Left cervical lymph node\par \par Gross Description\par \par The specimen is received fresh and the specimen container is\par labeled: Left cervical lymph node.  It consists of a 2.5 x 2.0 x\par 1.9 cm a bisected lymph node.  The specimen is further sectioned\par and entirely submitted in four cassettes.  Lymphoma workup is\par performed.\par \par In addition to other data that may appear on the specimen\par container, the label has been inspected to confirm the presence\par of the patient's name and date of birth.\par \par Sis Yu 12/13/2019 18:12\par \par  \par \par  Ordered by: CASA SANTOS       Collected/Examined: [id number] 01:39PM       \par Verified by: CASA SANTOS [id number] 01:41PM       \par  Result Communication: No patient communication needed at this time;\par Stage: Final       \par  Performed at: [facility]       Resulted: [id number] 04:18PM       Last Updated: [id number] 01:41PM       Accession: [id number]

## 2020-12-19 NOTE — ASSESSMENT
[FreeTextEntry1] : Stage IIB Hodgkins lymphoma \par s/p ABVD x 4 cycles\par PET post chemo neg for relapse dz\par now s/p adjuvant RT, remains RON on imaging \par

## 2020-12-19 NOTE — CONSULT LETTER
[Dear  ___] : Dear  [unfilled], [Referral Letter:] : I am referring [unfilled] to you for further evaluation.  My most recent evaluation follows. [Please see my note below.] : Please see my note below. [Referral Closing:] : Thank you very much for seeing this patient.  If you have any questions, please do not hesitate to contact me. [Sincerely,] : Sincerely, [FreeTextEntry3] : Virginie Ochoa MD. MS. \par Hematologist/Oncologist\par [facility]\par ph. [phone]\par fx. [phone]\par

## 2021-01-05 ENCOUNTER — APPOINTMENT (OUTPATIENT)
Dept: CARDIOLOGY | Facility: CLINIC | Age: 26
End: 2021-01-05
Payer: COMMERCIAL

## 2021-01-05 PROCEDURE — 99072 ADDL SUPL MATRL&STAF TM PHE: CPT

## 2021-01-05 PROCEDURE — 93306 TTE W/DOPPLER COMPLETE: CPT

## 2021-01-11 ENCOUNTER — OUTPATIENT (OUTPATIENT)
Dept: OUTPATIENT SERVICES | Facility: HOSPITAL | Age: 26
LOS: 1 days | Discharge: HOME | End: 2021-01-11

## 2021-01-11 DIAGNOSIS — C81.70 OTHER HODGKIN LYMPHOMA, UNSPECIFIED SITE: ICD-10-CM

## 2021-01-15 ENCOUNTER — APPOINTMENT (OUTPATIENT)
Dept: INFUSION THERAPY | Facility: HOSPITAL | Age: 26
End: 2021-01-15

## 2021-01-15 ENCOUNTER — LABORATORY RESULT (OUTPATIENT)
Age: 26
End: 2021-01-15

## 2021-01-15 ENCOUNTER — RESULT REVIEW (OUTPATIENT)
Age: 26
End: 2021-01-15

## 2021-01-15 LAB
BASOPHILS # BLD AUTO: 0.02 K/UL — SIGNIFICANT CHANGE UP (ref 0–0.2)
BASOPHILS NFR BLD AUTO: 0.5 % — SIGNIFICANT CHANGE UP (ref 0–2)
EOSINOPHIL # BLD AUTO: 0.04 K/UL — SIGNIFICANT CHANGE UP (ref 0–0.5)
EOSINOPHIL NFR BLD AUTO: 1 % — SIGNIFICANT CHANGE UP (ref 0–6)
HCT VFR BLD CALC: 40.8 % — SIGNIFICANT CHANGE UP (ref 34.5–45)
HGB BLD-MCNC: 13.8 G/DL — SIGNIFICANT CHANGE UP (ref 11.5–15.5)
IMM GRANULOCYTES NFR BLD AUTO: 0.5 % — SIGNIFICANT CHANGE UP (ref 0–1.5)
LYMPHOCYTES # BLD AUTO: 0.83 K/UL — LOW (ref 1–3.3)
LYMPHOCYTES # BLD AUTO: 20.6 % — SIGNIFICANT CHANGE UP (ref 13–44)
MCHC RBC-ENTMCNC: 32.3 PG — SIGNIFICANT CHANGE UP (ref 27–34)
MCHC RBC-ENTMCNC: 33.8 G/DL — SIGNIFICANT CHANGE UP (ref 32–36)
MCV RBC AUTO: 95.6 FL — SIGNIFICANT CHANGE UP (ref 80–100)
MONOCYTES # BLD AUTO: 0.3 K/UL — SIGNIFICANT CHANGE UP (ref 0–0.9)
MONOCYTES NFR BLD AUTO: 7.4 % — SIGNIFICANT CHANGE UP (ref 2–14)
NEUTROPHILS # BLD AUTO: 2.82 K/UL — SIGNIFICANT CHANGE UP (ref 1.8–7.4)
NEUTROPHILS NFR BLD AUTO: 70 % — SIGNIFICANT CHANGE UP (ref 43–77)
NRBC # BLD: 0 /100 WBCS — SIGNIFICANT CHANGE UP (ref 0–0)
PLATELET # BLD AUTO: 259 K/UL — SIGNIFICANT CHANGE UP (ref 150–400)
RBC # BLD: 4.27 M/UL — SIGNIFICANT CHANGE UP (ref 3.8–5.2)
RBC # FLD: 12.3 % — SIGNIFICANT CHANGE UP (ref 10.3–14.5)
WBC # BLD: 4.03 K/UL — SIGNIFICANT CHANGE UP (ref 3.8–10.5)
WBC # FLD AUTO: 4.03 K/UL — SIGNIFICANT CHANGE UP (ref 3.8–10.5)

## 2021-01-25 ENCOUNTER — NON-APPOINTMENT (OUTPATIENT)
Age: 26
End: 2021-01-25

## 2021-02-15 LAB — SARS-COV-2 N GENE NPH QL NAA+PROBE: NOT DETECTED

## 2021-03-01 ENCOUNTER — APPOINTMENT (OUTPATIENT)
Dept: HEMATOLOGY ONCOLOGY | Facility: CLINIC | Age: 26
End: 2021-03-01
Payer: COMMERCIAL

## 2021-03-01 ENCOUNTER — OUTPATIENT (OUTPATIENT)
Dept: OUTPATIENT SERVICES | Facility: HOSPITAL | Age: 26
LOS: 1 days | Discharge: HOME | End: 2021-03-01

## 2021-03-01 VITALS
HEART RATE: 82 BPM | BODY MASS INDEX: 34.87 KG/M2 | WEIGHT: 250 LBS | RESPIRATION RATE: 16 BRPM | OXYGEN SATURATION: 98 % | SYSTOLIC BLOOD PRESSURE: 105 MMHG | DIASTOLIC BLOOD PRESSURE: 75 MMHG

## 2021-03-01 DIAGNOSIS — C81.70 OTHER HODGKIN LYMPHOMA, UNSPECIFIED SITE: ICD-10-CM

## 2021-03-01 PROCEDURE — 85025 COMPLETE CBC W/AUTO DIFF WBC: CPT | Mod: GC

## 2021-03-01 PROCEDURE — 99072 ADDL SUPL MATRL&STAF TM PHE: CPT

## 2021-03-01 PROCEDURE — 99214 OFFICE O/P EST MOD 30 MIN: CPT | Mod: GC

## 2021-03-02 LAB — ERYTHROCYTE [SEDIMENTATION RATE] IN BLOOD BY WESTERGREN METHOD: 18 MM/HR

## 2021-03-03 LAB
ALBUMIN SERPL ELPH-MCNC: 4.5 G/DL
ALP BLD-CCNC: 71 U/L
ALT SERPL-CCNC: 9 U/L
ANION GAP SERPL CALC-SCNC: 14 MMOL/L
AST SERPL-CCNC: 14 U/L
BILIRUB SERPL-MCNC: <0.2 MG/DL
BUN SERPL-MCNC: 15 MG/DL
CALCIUM SERPL-MCNC: 9.7 MG/DL
CHLORIDE SERPL-SCNC: 104 MMOL/L
CO2 SERPL-SCNC: 21 MMOL/L
CREAT SERPL-MCNC: 0.83 MG/DL
GLUCOSE SERPL-MCNC: 98 MG/DL
LDH SERPL-CCNC: 146 U/L
MAGNESIUM SERPL-MCNC: 1.9 MG/DL
PHOSPHATE SERPL-MCNC: 3.5 MG/DL
POTASSIUM SERPL-SCNC: 4 MMOL/L
PROT SERPL-MCNC: 7.2 G/DL
SODIUM SERPL-SCNC: 139 MMOL/L
URATE SERPL-MCNC: 3.5 MG/DL

## 2021-03-03 NOTE — END OF VISIT
[] : Fellow [FreeTextEntry3] : Patient seen and case dissussed with Dr. Rothman. 26 yo female with history of Hodgkin's lymphoma now 1 year from completion of therapy. No signs of clinical relapse. Will plan for CT scans in May. She may receive her COVID19 vaccination. Follow up in 3 momths  [Time Spent: ___ minutes] : I have spent [unfilled] minutes of time on the encounter.

## 2021-03-03 NOTE — ASSESSMENT
[FreeTextEntry1] : 25 years old female with h/o stage IIB unfavorable Hodgkin lymphoma presents today for follow-up. She received 4 cycles of ABVD in 5/2020 followed by 30Gy radiation to the chest and left neck on 8/1/20. Post-treatment PET-CT on 11/5/20 showed complete response, no evidence of FDG avid disease. Presented today for follow-up.\par \par \par # Hodgkin Lymphoma, in remission\par - Doing well except lower extremity neuropathy during exercise. Neuropathy is bilateral and shooting nature from the thigh to toes. Could be due to the side effect of vinblastine, will refer to a physiatrist.\par - Given h/o chest radiation, she will need mammogram eight years after radiation. This will be around 8/2028, at her age of 32. Will also consider annual breast MRI in addition to mammogram once start screening.\par - Also consider early colorectal cancer screening at age 35-40.\par - Patient is eligible for COVID vaccination and has an appointment for her first dose this Saturday. A letter was provided.\par - Recommend family planning (pregnancy) when she achieves 2-year remission.\par - Patient has been donating blood before cancer diagnosis and wonders when she can restart donating. Recommend she wait for at least 2 more years.\par - Continue mediport flushing every 1-3 months.\par - Will plan for repeat CT scan in May, 6 months from the last PET scan.\par \par \par \par RTC in 3 months.\par

## 2021-03-03 NOTE — REVIEW OF SYSTEMS
[Negative] : Allergic/Immunologic [de-identified] : lower extremities tingling and numbness, worsens with exercise

## 2021-03-03 NOTE — PHYSICAL EXAM
[Fully active, able to carry on all pre-disease performance without restriction] : Status 0 - Fully active, able to carry on all pre-disease performance without restriction [Normal] : affect appropriate [Obese] : obese [de-identified] : right port insertion area without swelling or redness

## 2021-03-03 NOTE — HISTORY OF PRESENT ILLNESS
[Disease:__________________________] : Disease: [unfilled] [de-identified] : 25 years old female with h/o stage IIB unfavorable Hodgkin lymphoma presents today for follow-up. Patient transferred her care to me as Dr. Gabriela roberto [facility]. Back in 11/2019 she noticed neck adenopathy associated with skin rash and pruritus. She was evaluated by an ENT physician who performed an excisional left cervical lymph node biopsy which confirmed the diagnosis of classical Hodgkin lymphoma. She finished 4 cycles of ABVD in 5/2020 followed by 30Gy radiation to the chest and left neck on 8/1/20. Post-treatment PET-CT on 11/5/20 showed complete response, no evidence of FDG avid disease. Repeat TTE on 1/5/21 showed normal findings and EF of 70%.\par \par After finishing the treatment, patient is doing well in general. She feels lower extremity tingling and numbness especially after walking long time or doing an exercise. Denies fever, chills, SOB, chest pain, bowel habit change, appetite change, or any other symptoms. 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 She has COVID vaccination scheduled this Saturday.  [de-identified] : IIB

## 2021-03-05 ENCOUNTER — LABORATORY RESULT (OUTPATIENT)
Age: 26
End: 2021-03-05

## 2021-03-05 ENCOUNTER — RESULT REVIEW (OUTPATIENT)
Age: 26
End: 2021-03-05

## 2021-03-05 ENCOUNTER — APPOINTMENT (OUTPATIENT)
Dept: INFUSION THERAPY | Facility: HOSPITAL | Age: 26
End: 2021-03-05

## 2021-03-05 LAB
BASOPHILS # BLD AUTO: 0.03 K/UL — SIGNIFICANT CHANGE UP (ref 0–0.2)
BASOPHILS NFR BLD AUTO: 0.6 % — SIGNIFICANT CHANGE UP (ref 0–2)
EOSINOPHIL # BLD AUTO: 0.05 K/UL — SIGNIFICANT CHANGE UP (ref 0–0.5)
EOSINOPHIL NFR BLD AUTO: 1 % — SIGNIFICANT CHANGE UP (ref 0–6)
HCT VFR BLD CALC: 38.8 % — SIGNIFICANT CHANGE UP (ref 34.5–45)
HGB BLD-MCNC: 13.4 G/DL — SIGNIFICANT CHANGE UP (ref 11.5–15.5)
IMM GRANULOCYTES NFR BLD AUTO: 0.2 % — SIGNIFICANT CHANGE UP (ref 0–1.5)
LYMPHOCYTES # BLD AUTO: 0.79 K/UL — LOW (ref 1–3.3)
LYMPHOCYTES # BLD AUTO: 15.9 % — SIGNIFICANT CHANGE UP (ref 13–44)
MCHC RBC-ENTMCNC: 33.3 PG — SIGNIFICANT CHANGE UP (ref 27–34)
MCHC RBC-ENTMCNC: 34.5 G/DL — SIGNIFICANT CHANGE UP (ref 32–36)
MCV RBC AUTO: 96.5 FL — SIGNIFICANT CHANGE UP (ref 80–100)
MONOCYTES # BLD AUTO: 0.35 K/UL — SIGNIFICANT CHANGE UP (ref 0–0.9)
MONOCYTES NFR BLD AUTO: 7 % — SIGNIFICANT CHANGE UP (ref 2–14)
NEUTROPHILS # BLD AUTO: 3.75 K/UL — SIGNIFICANT CHANGE UP (ref 1.8–7.4)
NEUTROPHILS NFR BLD AUTO: 75.3 % — SIGNIFICANT CHANGE UP (ref 43–77)
NRBC # BLD: 0 /100 WBCS — SIGNIFICANT CHANGE UP (ref 0–0)
PLATELET # BLD AUTO: 249 K/UL — SIGNIFICANT CHANGE UP (ref 150–400)
RBC # BLD: 4.02 M/UL — SIGNIFICANT CHANGE UP (ref 3.8–5.2)
RBC # FLD: 12.3 % — SIGNIFICANT CHANGE UP (ref 10.3–14.5)
WBC # BLD: 4.98 K/UL — SIGNIFICANT CHANGE UP (ref 3.8–10.5)
WBC # FLD AUTO: 4.98 K/UL — SIGNIFICANT CHANGE UP (ref 3.8–10.5)

## 2021-03-12 ENCOUNTER — TRANSCRIPTION ENCOUNTER (OUTPATIENT)
Age: 26
End: 2021-03-12

## 2021-04-27 ENCOUNTER — OUTPATIENT (OUTPATIENT)
Dept: OUTPATIENT SERVICES | Facility: HOSPITAL | Age: 26
LOS: 1 days | Discharge: HOME | End: 2021-04-27

## 2021-04-27 DIAGNOSIS — C81.70 OTHER HODGKIN LYMPHOMA, UNSPECIFIED SITE: ICD-10-CM

## 2021-04-30 ENCOUNTER — LABORATORY RESULT (OUTPATIENT)
Age: 26
End: 2021-04-30

## 2021-04-30 ENCOUNTER — RESULT REVIEW (OUTPATIENT)
Age: 26
End: 2021-04-30

## 2021-04-30 ENCOUNTER — APPOINTMENT (OUTPATIENT)
Dept: INFUSION THERAPY | Facility: HOSPITAL | Age: 26
End: 2021-04-30

## 2021-04-30 LAB
BASOPHILS # BLD AUTO: 0.01 K/UL — SIGNIFICANT CHANGE UP (ref 0–0.2)
BASOPHILS NFR BLD AUTO: 0.2 % — SIGNIFICANT CHANGE UP (ref 0–2)
EOSINOPHIL # BLD AUTO: 0.04 K/UL — SIGNIFICANT CHANGE UP (ref 0–0.5)
EOSINOPHIL NFR BLD AUTO: 0.9 % — SIGNIFICANT CHANGE UP (ref 0–6)
HCT VFR BLD CALC: 38 % — SIGNIFICANT CHANGE UP (ref 34.5–45)
HGB BLD-MCNC: 12.9 G/DL — SIGNIFICANT CHANGE UP (ref 11.5–15.5)
IMM GRANULOCYTES NFR BLD AUTO: 0.2 % — SIGNIFICANT CHANGE UP (ref 0–1.5)
LYMPHOCYTES # BLD AUTO: 0.92 K/UL — LOW (ref 1–3.3)
LYMPHOCYTES # BLD AUTO: 20.6 % — SIGNIFICANT CHANGE UP (ref 13–44)
MCHC RBC-ENTMCNC: 32.7 PG — SIGNIFICANT CHANGE UP (ref 27–34)
MCHC RBC-ENTMCNC: 33.9 G/DL — SIGNIFICANT CHANGE UP (ref 32–36)
MCV RBC AUTO: 96.4 FL — SIGNIFICANT CHANGE UP (ref 80–100)
MONOCYTES # BLD AUTO: 0.28 K/UL — SIGNIFICANT CHANGE UP (ref 0–0.9)
MONOCYTES NFR BLD AUTO: 6.3 % — SIGNIFICANT CHANGE UP (ref 2–14)
NEUTROPHILS # BLD AUTO: 3.2 K/UL — SIGNIFICANT CHANGE UP (ref 1.8–7.4)
NEUTROPHILS NFR BLD AUTO: 71.8 % — SIGNIFICANT CHANGE UP (ref 43–77)
NRBC # BLD: 0 /100 WBCS — SIGNIFICANT CHANGE UP (ref 0–0)
PLATELET # BLD AUTO: 246 K/UL — SIGNIFICANT CHANGE UP (ref 150–400)
RBC # BLD: 3.94 M/UL — SIGNIFICANT CHANGE UP (ref 3.8–5.2)
RBC # FLD: 12.5 % — SIGNIFICANT CHANGE UP (ref 10.3–14.5)
WBC # BLD: 4.46 K/UL — SIGNIFICANT CHANGE UP (ref 3.8–10.5)
WBC # FLD AUTO: 4.46 K/UL — SIGNIFICANT CHANGE UP (ref 3.8–10.5)

## 2021-05-12 ENCOUNTER — APPOINTMENT (OUTPATIENT)
Dept: CT IMAGING | Facility: CLINIC | Age: 26
End: 2021-05-12
Payer: COMMERCIAL

## 2021-05-12 ENCOUNTER — OUTPATIENT (OUTPATIENT)
Dept: OUTPATIENT SERVICES | Facility: HOSPITAL | Age: 26
LOS: 1 days | End: 2021-05-12
Payer: COMMERCIAL

## 2021-05-12 DIAGNOSIS — C81.90 HODGKIN LYMPHOMA, UNSPECIFIED, UNSPECIFIED SITE: ICD-10-CM

## 2021-05-12 PROCEDURE — 71260 CT THORAX DX C+: CPT | Mod: 26

## 2021-05-12 PROCEDURE — 70491 CT SOFT TISSUE NECK W/DYE: CPT | Mod: 26

## 2021-05-12 PROCEDURE — 70491 CT SOFT TISSUE NECK W/DYE: CPT

## 2021-05-12 PROCEDURE — 71260 CT THORAX DX C+: CPT

## 2021-05-13 ENCOUNTER — APPOINTMENT (OUTPATIENT)
Dept: RADIATION ONCOLOGY | Facility: CLINIC | Age: 26
End: 2021-05-13
Payer: COMMERCIAL

## 2021-05-13 VITALS
WEIGHT: 258.93 LBS | OXYGEN SATURATION: 100 % | SYSTOLIC BLOOD PRESSURE: 140 MMHG | HEART RATE: 82 BPM | DIASTOLIC BLOOD PRESSURE: 85 MMHG | BODY MASS INDEX: 36.11 KG/M2 | RESPIRATION RATE: 16 BRPM

## 2021-05-13 PROCEDURE — 99213 OFFICE O/P EST LOW 20 MIN: CPT

## 2021-05-13 PROCEDURE — 99072 ADDL SUPL MATRL&STAF TM PHE: CPT

## 2021-05-17 NOTE — HISTORY OF PRESENT ILLNESS
[FreeTextEntry1] : Ms Concepcion is a 25 yo female with newly diagnosed [place] Stage II unfavorable Hodgkins lymphoma involving the bilateral cervical supraclavicular, axilla, mediastinal lymph nodes as well as IMNs. s/p 4 cycles ABVD in May 2020 with Dr Franko Mayer\par She completed 3000 cGy radiation therapy to the left neck on 8/10/2020.\par \par 5/12/2021 CT Neck Soft Tissue:  Impression: No interval change from PET CT 11/6/2020, no new large or necrotic lymphadenopathy.\par \par Returns for F/U. Doing well. Denies pain \par \par

## 2021-06-16 ENCOUNTER — OUTPATIENT (OUTPATIENT)
Dept: OUTPATIENT SERVICES | Facility: HOSPITAL | Age: 26
LOS: 1 days | Discharge: HOME | End: 2021-06-16

## 2021-06-16 DIAGNOSIS — C81.70 OTHER HODGKIN LYMPHOMA, UNSPECIFIED SITE: ICD-10-CM

## 2021-06-17 ENCOUNTER — LABORATORY RESULT (OUTPATIENT)
Age: 26
End: 2021-06-17

## 2021-06-17 ENCOUNTER — APPOINTMENT (OUTPATIENT)
Dept: HEMATOLOGY ONCOLOGY | Facility: CLINIC | Age: 26
End: 2021-06-17
Payer: COMMERCIAL

## 2021-06-17 VITALS
RESPIRATION RATE: 16 BRPM | TEMPERATURE: 98.5 F | WEIGHT: 260 LBS | SYSTOLIC BLOOD PRESSURE: 110 MMHG | BODY MASS INDEX: 36.26 KG/M2 | OXYGEN SATURATION: 99 % | DIASTOLIC BLOOD PRESSURE: 70 MMHG | HEART RATE: 69 BPM

## 2021-06-17 PROCEDURE — 99072 ADDL SUPL MATRL&STAF TM PHE: CPT

## 2021-06-17 PROCEDURE — 99213 OFFICE O/P EST LOW 20 MIN: CPT | Mod: GC

## 2021-06-17 PROCEDURE — 85025 COMPLETE CBC W/AUTO DIFF WBC: CPT | Mod: GC

## 2021-06-17 NOTE — ASSESSMENT
[FreeTextEntry1] : 25 years old female with h/o stage IIB unfavorable Hodgkin lymphoma presents today for follow-up. She received 4 cycles of ABVD in 5/2020 followed by 30Gy radiation to the chest and left neck on 8/1/20. Post-treatment PET-CT on 11/5/20 showed complete response, no evidence of FDG avid disease. Presented today for follow-up.\par \par \par # Hodgkin Lymphoma, in remission\par - Given h/o chest radiation, she will need mammogram eight years after radiation. This will be around 8/2028, at her age of 32. Will also consider annual breast MRI in addition to mammogram once start screening.\par - Also consider early colorectal cancer screening at age 35-40.\par -she ha received both COVID19 vaccines, will check antibody levels \par - Recommend family planning (pregnancy) when she achieves 2-year remission.\par - Patient has been donating blood before cancer diagnosis and wonders when she can restart donating. Recommend she wait for at least 2 more years.\par - Continue mediport flushing every 1-3 months.\par -plan for scans every 6 months for 2 years since last chemo, next due in 11/2021\par \par RTO in 3 months, sooner PRN\par

## 2021-06-18 ENCOUNTER — APPOINTMENT (OUTPATIENT)
Dept: INFUSION THERAPY | Facility: HOSPITAL | Age: 26
End: 2021-06-18

## 2021-06-20 DIAGNOSIS — C81.90 HODGKIN LYMPHOMA, UNSPECIFIED, UNSPECIFIED SITE: ICD-10-CM

## 2021-07-29 ENCOUNTER — OUTPATIENT (OUTPATIENT)
Dept: OUTPATIENT SERVICES | Facility: HOSPITAL | Age: 26
LOS: 1 days | Discharge: HOME | End: 2021-07-29

## 2021-07-29 DIAGNOSIS — C81.70 OTHER HODGKIN LYMPHOMA, UNSPECIFIED SITE: ICD-10-CM

## 2021-07-30 ENCOUNTER — APPOINTMENT (OUTPATIENT)
Dept: INFUSION THERAPY | Facility: HOSPITAL | Age: 26
End: 2021-07-30

## 2021-08-12 ENCOUNTER — APPOINTMENT (OUTPATIENT)
Dept: CT IMAGING | Facility: CLINIC | Age: 26
End: 2021-08-12

## 2021-08-23 ENCOUNTER — TRANSCRIPTION ENCOUNTER (OUTPATIENT)
Age: 26
End: 2021-08-23

## 2021-09-23 ENCOUNTER — APPOINTMENT (OUTPATIENT)
Dept: HEMATOLOGY ONCOLOGY | Facility: CLINIC | Age: 26
End: 2021-09-23
Payer: COMMERCIAL

## 2021-09-23 ENCOUNTER — OUTPATIENT (OUTPATIENT)
Dept: OUTPATIENT SERVICES | Facility: HOSPITAL | Age: 26
LOS: 1 days | Discharge: HOME | End: 2021-09-23

## 2021-09-23 VITALS
BODY MASS INDEX: 36.96 KG/M2 | SYSTOLIC BLOOD PRESSURE: 110 MMHG | TEMPERATURE: 98.7 F | DIASTOLIC BLOOD PRESSURE: 72 MMHG | HEART RATE: 76 BPM | RESPIRATION RATE: 16 BRPM | WEIGHT: 265 LBS | OXYGEN SATURATION: 99 %

## 2021-09-23 DIAGNOSIS — C81.70 OTHER HODGKIN LYMPHOMA, UNSPECIFIED SITE: ICD-10-CM

## 2021-09-23 PROCEDURE — 99213 OFFICE O/P EST LOW 20 MIN: CPT | Mod: GC

## 2021-09-24 ENCOUNTER — LABORATORY RESULT (OUTPATIENT)
Age: 26
End: 2021-09-24

## 2021-09-24 ENCOUNTER — RESULT REVIEW (OUTPATIENT)
Age: 26
End: 2021-09-24

## 2021-09-24 ENCOUNTER — APPOINTMENT (OUTPATIENT)
Dept: INFUSION THERAPY | Facility: HOSPITAL | Age: 26
End: 2021-09-24

## 2021-09-24 LAB
BASOPHILS # BLD AUTO: 0.04 K/UL — SIGNIFICANT CHANGE UP (ref 0–0.2)
BASOPHILS NFR BLD AUTO: 1 % — SIGNIFICANT CHANGE UP (ref 0–2)
EOSINOPHIL # BLD AUTO: 0.05 K/UL — SIGNIFICANT CHANGE UP (ref 0–0.5)
EOSINOPHIL NFR BLD AUTO: 1.2 % — SIGNIFICANT CHANGE UP (ref 0–6)
HCT VFR BLD CALC: 40.2 % — SIGNIFICANT CHANGE UP (ref 34.5–45)
HGB BLD-MCNC: 13.5 G/DL — SIGNIFICANT CHANGE UP (ref 11.5–15.5)
IMM GRANULOCYTES NFR BLD AUTO: 0.2 % — SIGNIFICANT CHANGE UP (ref 0–1.5)
LYMPHOCYTES # BLD AUTO: 1.09 K/UL — SIGNIFICANT CHANGE UP (ref 1–3.3)
LYMPHOCYTES # BLD AUTO: 26.4 % — SIGNIFICANT CHANGE UP (ref 13–44)
MCHC RBC-ENTMCNC: 32.5 PG — SIGNIFICANT CHANGE UP (ref 27–34)
MCHC RBC-ENTMCNC: 33.6 G/DL — SIGNIFICANT CHANGE UP (ref 32–36)
MCV RBC AUTO: 96.9 FL — SIGNIFICANT CHANGE UP (ref 80–100)
MONOCYTES # BLD AUTO: 0.3 K/UL — SIGNIFICANT CHANGE UP (ref 0–0.9)
MONOCYTES NFR BLD AUTO: 7.3 % — SIGNIFICANT CHANGE UP (ref 2–14)
NEUTROPHILS # BLD AUTO: 2.64 K/UL — SIGNIFICANT CHANGE UP (ref 1.8–7.4)
NEUTROPHILS NFR BLD AUTO: 63.9 % — SIGNIFICANT CHANGE UP (ref 43–77)
NRBC # BLD: 0 /100 WBCS — SIGNIFICANT CHANGE UP (ref 0–0)
PLATELET # BLD AUTO: 250 K/UL — SIGNIFICANT CHANGE UP (ref 150–400)
RBC # BLD: 4.15 M/UL — SIGNIFICANT CHANGE UP (ref 3.8–5.2)
RBC # FLD: 12.3 % — SIGNIFICANT CHANGE UP (ref 10.3–14.5)
WBC # BLD: 4.13 K/UL — SIGNIFICANT CHANGE UP (ref 3.8–10.5)
WBC # FLD AUTO: 4.13 K/UL — SIGNIFICANT CHANGE UP (ref 3.8–10.5)

## 2021-09-24 NOTE — END OF VISIT
[FreeTextEntry3] : Patient seen and case discussed with KAMRON Petersen. I agree with above and have edited the note where needed.\par

## 2021-09-24 NOTE — PHYSICAL EXAM
[Fully active, able to carry on all pre-disease performance without restriction] : Status 0 - Fully active, able to carry on all pre-disease performance without restriction [Obese] : obese [Normal] : affect appropriate [de-identified] : right port insertion area without swelling or redness

## 2021-09-24 NOTE — ASSESSMENT
[FreeTextEntry1] : 25 years old female with h/o stage IIB unfavorable Hodgkin lymphoma presents today for follow-up. She received 4 cycles of ABVD in 5/2020 followed by 30Gy radiation to the chest and left neck on 8/1/20. Post-treatment PET-CT on 11/5/20 showed complete response, no evidence of FDG avid disease. Presented today for follow-up.\par \par \par # Hodgkin Lymphoma, in remission\par - Given h/o chest radiation, she will need mammogram eight years after radiation. This will be around 8/2028, at her age of 32. Will also consider annual breast MRI in addition to mammogram once start screening.\par - Also consider early colorectal cancer screening at age 35-40.\par -she has received both COVID19 vaccines, she has antibodies >250.00 U/mL\par - Recommend family planning (pregnancy) when she achieves 2-year remission.\par - Patient has been donating blood before cancer diagnosis and wonders when she can restart donating. Recommend she wait for at least 2 more years.\par - Continue mediport flushing every 1-3 months. Has port flush tomorrow 9/24/21 with labs. \par -due to endorsement of night sweats, will have PET/CT done for restaging. In addition, will check TSH levels. \par -plan for scans every 6 months for 2 years since last chemo\par -RTO in 3 months, sooner PRN\par

## 2021-09-24 NOTE — HISTORY OF PRESENT ILLNESS
[Disease:__________________________] : Disease: [unfilled] [de-identified] : 25 years old female with h/o stage IIB unfavorable Hodgkin lymphoma presents today for follow-up. Patient transferred her care to us as Dr. Ochoa left [facility]. Back in 11/2019 she noticed neck adenopathy associated with skin rash and pruritus. She was evaluated by an ENT physician who performed an excisional left cervical lymph node biopsy which confirmed the diagnosis of classical Hodgkin lymphoma. She finished 4 cycles of ABVD in 5/2020 followed by 30Gy radiation to the chest and left neck on 8/1/20. Post-treatment PET-CT on 11/5/20 showed complete response, no evidence of FDG avid disease. Repeat TTE on 1/5/21 showed normal findings and EF of 70%.\par \par  [de-identified] : IIB [de-identified] : Since her last follow up she has been feeling well, however she endorses 1 to 2 episode of night sweats a week-she is not sure if its related to her thyroid. She had been exercising more and eating healthier to loose weight, however has a hard time loosing the weight.\par No fevers/chills. No fatigue. No drenching nights sweats. No enlarged lymph nodes. Appetite is good. Weight stable. No CP/SOB. No easy bruising/bleeding. No GI/ issues. No recent/recurrent infections. No new medications.

## 2021-09-24 NOTE — RESULTS/DATA
[FreeTextEntry1] : CBC will be done on 9/24/21 with chemo port flush\par WBC \par ALC \par ANC \par Hgb \par Hct \par Plt

## 2021-10-08 ENCOUNTER — OUTPATIENT (OUTPATIENT)
Dept: OUTPATIENT SERVICES | Facility: HOSPITAL | Age: 26
LOS: 1 days | End: 2021-10-08
Payer: COMMERCIAL

## 2021-10-08 ENCOUNTER — APPOINTMENT (OUTPATIENT)
Dept: CT IMAGING | Facility: CLINIC | Age: 26
End: 2021-10-08
Payer: COMMERCIAL

## 2021-10-08 DIAGNOSIS — C81.90 HODGKIN LYMPHOMA, UNSPECIFIED, UNSPECIFIED SITE: ICD-10-CM

## 2021-10-08 PROCEDURE — 70491 CT SOFT TISSUE NECK W/DYE: CPT | Mod: 26

## 2021-10-08 PROCEDURE — 71260 CT THORAX DX C+: CPT | Mod: 26

## 2021-10-08 PROCEDURE — 71260 CT THORAX DX C+: CPT

## 2021-10-08 PROCEDURE — 74177 CT ABD & PELVIS W/CONTRAST: CPT

## 2021-10-08 PROCEDURE — 74177 CT ABD & PELVIS W/CONTRAST: CPT | Mod: 26

## 2021-10-08 PROCEDURE — 70491 CT SOFT TISSUE NECK W/DYE: CPT

## 2021-10-10 ENCOUNTER — APPOINTMENT (OUTPATIENT)
Dept: NUCLEAR MEDICINE | Facility: IMAGING CENTER | Age: 26
End: 2021-10-10
Payer: COMMERCIAL

## 2021-10-10 ENCOUNTER — OUTPATIENT (OUTPATIENT)
Dept: OUTPATIENT SERVICES | Facility: HOSPITAL | Age: 26
LOS: 1 days | End: 2021-10-10
Payer: COMMERCIAL

## 2021-10-10 DIAGNOSIS — C81.90 HODGKIN LYMPHOMA, UNSPECIFIED, UNSPECIFIED SITE: ICD-10-CM

## 2021-10-10 DIAGNOSIS — Z00.8 ENCOUNTER FOR OTHER GENERAL EXAMINATION: ICD-10-CM

## 2021-10-10 PROCEDURE — 78815 PET IMAGE W/CT SKULL-THIGH: CPT

## 2021-10-10 PROCEDURE — 78815 PET IMAGE W/CT SKULL-THIGH: CPT | Mod: 26,PS

## 2021-10-10 PROCEDURE — A9552: CPT

## 2021-10-18 ENCOUNTER — APPOINTMENT (OUTPATIENT)
Dept: OTOLARYNGOLOGY | Facility: CLINIC | Age: 26
End: 2021-10-18
Payer: COMMERCIAL

## 2021-10-18 VITALS
WEIGHT: 265 LBS | BODY MASS INDEX: 37.1 KG/M2 | TEMPERATURE: 97.8 F | HEART RATE: 80 BPM | SYSTOLIC BLOOD PRESSURE: 116 MMHG | DIASTOLIC BLOOD PRESSURE: 80 MMHG | HEIGHT: 71 IN

## 2021-10-18 PROCEDURE — 99204 OFFICE O/P NEW MOD 45 MIN: CPT | Mod: 25

## 2021-10-18 PROCEDURE — 31231 NASAL ENDOSCOPY DX: CPT

## 2021-10-18 NOTE — PROCEDURE
[Posterior Lesion] : posterior lesion [FreeTextEntry6] : There is slightly prominent soft tissue in the adenoid pad.  No evidence of sinonasal inflammatory disease, no evidence of drainage from OMC, slight right septal spur.   [Gag Reflex] : gag reflex preventing mirror examination [None] : none [Flexible Endoscope] : examined with the flexible endoscope [Serial Number: ___] : Serial Number: [unfilled] [de-identified] : No lesions in the OPx, HPx or larynx.  VC are mobile, airway patent.\par

## 2021-10-18 NOTE — PHYSICAL EXAM
[Laryngoscopy Performed] : laryngoscopy was performed, see procedure section for findings [Normal] : no rashes [Nasal Endoscopy Performed] : nasal endoscopy was performed, see procedure section for findings [] : septum deviated to the right [FreeTextEntry1] : No concerning lesions in the OC/OPx on inspection or palpation. 2+ cryptic tonsils.

## 2021-10-18 NOTE — REASON FOR VISIT
[Initial Evaluation] : an initial evaluation for [FreeTextEntry2] : hx of lymphoma and abnormal imaging

## 2021-10-18 NOTE — HISTORY OF PRESENT ILLNESS
[de-identified] : pt with hx of lymphoma in 12/2019 tx with chemo and rad 8/10/2020 and refer by Dr. Echeverria for abnormal imaging. ct of neck on 10/13/2021 showed No gross CT evidence of cervical lymphadenopathy and Mild, bilaterally symmetric enlargement of the palatine tonsils . PET ct 10/2021 Nonspecific, symmetric increased FDG activity in the palatine tonsils, not significantly changed.  pt has some discomfort at the throat for 2 weeks and chronic sinusitis (with deviate septum)   and no fever or dysphagia or wt loss or gain or coughing.  As symptoms for her sinusitis, she reports some nasal congestion had reports having intense headaches where she is photosensitive.

## 2021-10-26 ENCOUNTER — OUTPATIENT (OUTPATIENT)
Dept: OUTPATIENT SERVICES | Facility: HOSPITAL | Age: 26
LOS: 1 days | End: 2021-10-26
Payer: COMMERCIAL

## 2021-10-26 VITALS
WEIGHT: 265 LBS | OXYGEN SATURATION: 97 % | HEART RATE: 76 BPM | TEMPERATURE: 98 F | DIASTOLIC BLOOD PRESSURE: 85 MMHG | SYSTOLIC BLOOD PRESSURE: 124 MMHG | HEIGHT: 71 IN | RESPIRATION RATE: 16 BRPM

## 2021-10-26 DIAGNOSIS — Z98.890 OTHER SPECIFIED POSTPROCEDURAL STATES: Chronic | ICD-10-CM

## 2021-10-26 DIAGNOSIS — Z95.828 PRESENCE OF OTHER VASCULAR IMPLANTS AND GRAFTS: Chronic | ICD-10-CM

## 2021-10-26 DIAGNOSIS — C81.90 HODGKIN LYMPHOMA, UNSPECIFIED, UNSPECIFIED SITE: ICD-10-CM

## 2021-10-26 LAB
ANION GAP SERPL CALC-SCNC: 12 MMOL/L — SIGNIFICANT CHANGE UP (ref 7–14)
BUN SERPL-MCNC: 12 MG/DL — SIGNIFICANT CHANGE UP (ref 7–23)
CALCIUM SERPL-MCNC: 9.3 MG/DL — SIGNIFICANT CHANGE UP (ref 8.4–10.5)
CHLORIDE SERPL-SCNC: 100 MMOL/L — SIGNIFICANT CHANGE UP (ref 98–107)
CO2 SERPL-SCNC: 25 MMOL/L — SIGNIFICANT CHANGE UP (ref 22–31)
CREAT SERPL-MCNC: 0.72 MG/DL — SIGNIFICANT CHANGE UP (ref 0.5–1.3)
GLUCOSE SERPL-MCNC: 100 MG/DL — HIGH (ref 70–99)
HCG UR QL: NEGATIVE — SIGNIFICANT CHANGE UP
HCT VFR BLD CALC: 42.3 % — SIGNIFICANT CHANGE UP (ref 34.5–45)
HGB BLD-MCNC: 14.1 G/DL — SIGNIFICANT CHANGE UP (ref 11.5–15.5)
MCHC RBC-ENTMCNC: 33.2 PG — SIGNIFICANT CHANGE UP (ref 27–34)
MCHC RBC-ENTMCNC: 33.3 GM/DL — SIGNIFICANT CHANGE UP (ref 32–36)
MCV RBC AUTO: 99.5 FL — SIGNIFICANT CHANGE UP (ref 80–100)
NRBC # BLD: 0 /100 WBCS — SIGNIFICANT CHANGE UP
NRBC # FLD: 0 K/UL — SIGNIFICANT CHANGE UP
PLATELET # BLD AUTO: 260 K/UL — SIGNIFICANT CHANGE UP (ref 150–400)
POTASSIUM SERPL-MCNC: 4.3 MMOL/L — SIGNIFICANT CHANGE UP (ref 3.5–5.3)
POTASSIUM SERPL-SCNC: 4.3 MMOL/L — SIGNIFICANT CHANGE UP (ref 3.5–5.3)
RBC # BLD: 4.25 M/UL — SIGNIFICANT CHANGE UP (ref 3.8–5.2)
RBC # FLD: 12 % — SIGNIFICANT CHANGE UP (ref 10.3–14.5)
SODIUM SERPL-SCNC: 137 MMOL/L — SIGNIFICANT CHANGE UP (ref 135–145)
WBC # BLD: 5.55 K/UL — SIGNIFICANT CHANGE UP (ref 3.8–10.5)
WBC # FLD AUTO: 5.55 K/UL — SIGNIFICANT CHANGE UP (ref 3.8–10.5)

## 2021-10-26 PROCEDURE — 93010 ELECTROCARDIOGRAM REPORT: CPT

## 2021-10-26 RX ORDER — SODIUM CHLORIDE 9 MG/ML
1000 INJECTION, SOLUTION INTRAVENOUS
Refills: 0 | Status: DISCONTINUED | OUTPATIENT
Start: 2021-11-04 | End: 2021-11-19

## 2021-10-26 NOTE — H&P PST ADULT - NSICDXPASTSURGICALHX_GEN_ALL_CORE_FT
no
PAST SURGICAL HISTORY:  Port-A-Cath in place mediport 12/2019, right    S/P lymph node biopsy left cervical 12/2019

## 2021-10-26 NOTE — H&P PST ADULT - NEGATIVE ENMT SYMPTOMS
no hearing difficulty/no ear pain/no tinnitus/no vertigo/no sinus symptoms/no nasal congestion/no dysphagia no hearing difficulty/no ear pain/no tinnitus/no vertigo/no nasal congestion/no dysphagia

## 2021-10-26 NOTE — H&P PST ADULT - HISTORY OF PRESENT ILLNESS
25 yo female with preop dx. Hodgkin lymphoma presents to pre surgical testing. Pt s/p chemo and RT 8/2020.  Pt follow up neck CT revealing enlarged tonsils, pt followed up with PET/CT. Pt is scheduled for nasal endoscopy with biopsy, direct laryngoscopy, esophagoscopy.

## 2021-10-26 NOTE — H&P PST ADULT - PROBLEM SELECTOR PLAN 1
Pt is scheduled for nasal endoscopy with biopsy, direct laryngoscopy, esophagoscopy on 11/4/21.  Verbal and written pre op instructions reviewed with patient and pt able to verbalize understanding.  Pt instructed to follow surgeon's guidelines regarding COVID testing preop.    Sterile cup given, pt instructed to bring urine sample AM of surgery for UCG and pt able to verbalize understanding.

## 2021-10-26 NOTE — H&P PST ADULT - NSICDXFAMILYHX_GEN_ALL_CORE_FT
FAMILY HISTORY:  Family history of Hodgkin's lymphoma, cousin  FH: aneurysm, grandmother  FH: heart disease, valve disease-mother, grandfather-MI

## 2021-11-01 ENCOUNTER — APPOINTMENT (OUTPATIENT)
Dept: DISASTER EMERGENCY | Facility: CLINIC | Age: 26
End: 2021-11-01

## 2021-11-02 LAB — SARS-COV-2 N GENE NPH QL NAA+PROBE: NOT DETECTED

## 2021-11-03 ENCOUNTER — TRANSCRIPTION ENCOUNTER (OUTPATIENT)
Age: 26
End: 2021-11-03

## 2021-11-03 PROBLEM — C81.90 HODGKIN LYMPHOMA, UNSPECIFIED, UNSPECIFIED SITE: Chronic | Status: ACTIVE | Noted: 2021-10-26

## 2021-11-04 ENCOUNTER — OUTPATIENT (OUTPATIENT)
Dept: OUTPATIENT SERVICES | Facility: HOSPITAL | Age: 26
LOS: 1 days | Discharge: HOME | End: 2021-11-04
Payer: COMMERCIAL

## 2021-11-04 ENCOUNTER — OUTPATIENT (OUTPATIENT)
Dept: OUTPATIENT SERVICES | Facility: HOSPITAL | Age: 26
LOS: 1 days | End: 2021-11-04
Payer: COMMERCIAL

## 2021-11-04 ENCOUNTER — APPOINTMENT (OUTPATIENT)
Dept: OTOLARYNGOLOGY | Facility: HOSPITAL | Age: 26
End: 2021-11-04

## 2021-11-04 VITALS
OXYGEN SATURATION: 99 % | TEMPERATURE: 99 F | SYSTOLIC BLOOD PRESSURE: 129 MMHG | HEIGHT: 72 IN | WEIGHT: 265 LBS | DIASTOLIC BLOOD PRESSURE: 80 MMHG | HEART RATE: 63 BPM | RESPIRATION RATE: 18 BRPM

## 2021-11-04 VITALS
TEMPERATURE: 97 F | RESPIRATION RATE: 18 BRPM | HEART RATE: 66 BPM | OXYGEN SATURATION: 99 % | SYSTOLIC BLOOD PRESSURE: 129 MMHG | DIASTOLIC BLOOD PRESSURE: 85 MMHG

## 2021-11-04 DIAGNOSIS — Z95.828 PRESENCE OF OTHER VASCULAR IMPLANTS AND GRAFTS: Chronic | ICD-10-CM

## 2021-11-04 DIAGNOSIS — Z98.890 OTHER SPECIFIED POSTPROCEDURAL STATES: Chronic | ICD-10-CM

## 2021-11-04 DIAGNOSIS — C81.90 HODGKIN LYMPHOMA, UNSPECIFIED, UNSPECIFIED SITE: ICD-10-CM

## 2021-11-04 DIAGNOSIS — G63 POLYNEUROPATHY IN DISEASES CLASSIFIED ELSEWHERE: ICD-10-CM

## 2021-11-04 LAB — HCG UR QL: NEGATIVE — SIGNIFICANT CHANGE UP

## 2021-11-04 PROCEDURE — 88189 FLOWCYTOMETRY/READ 16 & >: CPT

## 2021-11-04 PROCEDURE — 88264 CHROMOSOME ANALYSIS 20-25: CPT

## 2021-11-04 PROCEDURE — 88280 CHROMOSOME KARYOTYPE STUDY: CPT

## 2021-11-04 PROCEDURE — 31525 DX LARYNGOSCOPY EXCL NB: CPT | Mod: GC

## 2021-11-04 PROCEDURE — 88237 TISSUE CULTURE BONE MARROW: CPT

## 2021-11-04 PROCEDURE — 31237 NSL/SINS NDSC SURG BX POLYPC: CPT | Mod: GC

## 2021-11-04 RX ORDER — OXYCODONE HYDROCHLORIDE 5 MG/1
5 TABLET ORAL ONCE
Refills: 0 | Status: DISCONTINUED | OUTPATIENT
Start: 2021-11-04 | End: 2021-11-04

## 2021-11-04 RX ORDER — ONDANSETRON 8 MG/1
4 TABLET, FILM COATED ORAL ONCE
Refills: 0 | Status: DISCONTINUED | OUTPATIENT
Start: 2021-11-04 | End: 2021-11-19

## 2021-11-04 RX ORDER — FENTANYL CITRATE 50 UG/ML
50 INJECTION INTRAVENOUS
Refills: 0 | Status: DISCONTINUED | OUTPATIENT
Start: 2021-11-04 | End: 2021-11-04

## 2021-11-04 RX ORDER — BENZOCAINE AND MENTHOL 5; 1 G/100ML; G/100ML
1 LIQUID ORAL ONCE
Refills: 0 | Status: COMPLETED | OUTPATIENT
Start: 2021-11-04 | End: 2021-11-04

## 2021-11-04 RX ORDER — FENTANYL CITRATE 50 UG/ML
25 INJECTION INTRAVENOUS
Refills: 0 | Status: DISCONTINUED | OUTPATIENT
Start: 2021-11-04 | End: 2021-11-04

## 2021-11-04 RX ORDER — ACETAMINOPHEN 500 MG
2 TABLET ORAL
Qty: 0 | Refills: 0 | DISCHARGE
Start: 2021-11-04

## 2021-11-04 RX ORDER — ACETAMINOPHEN 500 MG
650 TABLET ORAL EVERY 6 HOURS
Refills: 0 | Status: DISCONTINUED | OUTPATIENT
Start: 2021-11-04 | End: 2021-11-19

## 2021-11-04 RX ADMIN — OXYCODONE HYDROCHLORIDE 5 MILLIGRAM(S): 5 TABLET ORAL at 15:40

## 2021-11-04 RX ADMIN — OXYCODONE HYDROCHLORIDE 5 MILLIGRAM(S): 5 TABLET ORAL at 15:35

## 2021-11-04 RX ADMIN — FENTANYL CITRATE 50 MICROGRAM(S): 50 INJECTION INTRAVENOUS at 15:33

## 2021-11-04 RX ADMIN — FENTANYL CITRATE 50 MICROGRAM(S): 50 INJECTION INTRAVENOUS at 15:25

## 2021-11-04 NOTE — ASU PREOP CHECKLIST - TO WHOM
CHIEF COMPLAINT: Follow up of multiple issues     HISTORY OF PRESENT ILLNESS: This is a 78-year-old woman who presents for follow up of above    SHe has not had abdominal pain, dizziness, syncope, lightheadedness.    She has sinus congestion with phegm caught in her throat. She takes cough syrup and antibiotics without much relief.  She has a dry cough. She wakes up coughing. The cough occurs when she lies down.  She has been taking pantoprazole 40 m g twice daily.  Water helps the cough.  Food can get stuck in her throat at times. THe flonase nasal spray  Helps the food getting stuck in the back of her throat.      She is taking amlodipine 5 mg daily and coreg 25 mg twice daily and lasix 40 mg twice daily for her hypertensino and diastolic dysfunction.      Left knee bothers her at times.  Right knee is fine now.      No chest pain, palpitations, shortness of breath.       She is not taking allopurinol 100 mg daily for gout.        She is now taking duloxetine 60 mg twice daily. Mood is doing better      She takes tizanidine 4 mg at bedtime for muscle spasm of her back. She continues to have back pain. She is taking Oxycodone apap 10/325 3-5 times daily which helps her back. She had an epidural with pain management on 9/17/19.  She is having some restless legs at night. She takes roprinole at bedtime     She is taking Lantus 60 units in the evening and Novolog 2 units once or twice daily with meals.  No hypoglycemia.  Her meter is broken.       PAST MEDICAL HISTORY:   1. Diabetes mellitus   2. Hypertension   3. Hyperlipidemia   4. Left heart catheterization January 2007 which revealed luminal irregularities in the LAD, left circumflex and right coronary artery. She had diastolic dysfunction and patent renal arteries.   5. Sleep apnea   6. Obesity.   7. Nephrolithiasis status post lithotripsy.   8. Reflux - had nonerosive gastropathy on EGD September 2007. Gastritis on EGD 9/2010  9. Hidradenitis suppurativa.   10.  "History of diverticulitis with a hospitalized October 2007.   11. Migraines   12. Obesity.   13. 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    14. Total hysterectomy in 1969.   15. Cholecystectomy was done at that time as well.   16. Post herpeic neuralgia of the right chest wall.   17. Colon polyps on colonscopy 11/2010 - due 2013   18. Hospitalization 12/10 for e coli sepsis due to left ureteral stone with left nephrostomy tube in [place], [place]   19. Left knee replacement 9/2012      MEDICATIONS and ALLERGIES: Updated on EPIC.     PHYSICAL EXAMINATION:      /80   Pulse 75   Ht 5' 8" (1.727 m)   Wt 101.9 kg (224 lb 8.6 oz)   LMP  (LMP Unknown)   SpO2 96%   BMI 34.14 kg/m²     GENERAL: She is alert, oriented, no apparent distress. Affect within   normal limits.  Conjunctiva anicteric. . Oropharynx clear. TM clear   NECK: Supple.   Respiratory: Effort normal. Lungs clear  HEART: Regular rate and rhythm without murmurs, gallops or rubs.   No lower extremity edema.    ABDOMEN: soft, non distended, bowel sounds present, no hepatosplenomgaly.          ASSESSMENT AND PLAN:   1.  Swallowing disorder - m odified barium swallow in speech therapy  2. Gout - allopurinol 100 mg daily.   2.  Mood disorder - stable  duloxetine to 60 mg twice daily  3.  Neck, shoulder and back pain -follow up with [facility].  4. Asthma - stable   6. HTN -stable  7. Gerd -get back on pantoprazole 40 mg twice daily -   8. Diabetes with neuropathy- watch sugars- labs  9. Hyperlipidemia - off lipitor  10. Allergic rhinitis - stable  11. Diastolic congestive heart failure - stable  12. CRI -stable  13. Obesity - discussed diet, exercise and weight loss  14. CAD -risk factor modification  15. Aortic atherosclerosis and possible mesenteric artery stenosis- risk factor modification  16. Tortuous aorta - HTN controlled  17. Colon polyps - Colonoscopy in 8/2013 - 2 " polyps. Flex sig 11/2013 - mild granular mucosa. Colonoscopy 6/2017 diverticulosis - no polyps due 2023.   18. hyperparathryodisism -saw nephrology   19. .Restless leg syndrome - on roprinole    .  MMG 5/19  Prevnar 10/15 and flu shot 11/16  I will see her back in 8 weeks sooner if issues. .   Julisa/ ESVIN Madrigal

## 2021-11-04 NOTE — BRIEF OPERATIVE NOTE - OPERATION/FINDINGS
prominent nasopharyngeal tissue, friable, sent for biopsy  2+ tonsils b/l, cryptic, normal in appearance, symmetric.

## 2021-11-04 NOTE — ASU DISCHARGE PLAN (ADULT/PEDIATRIC) - CALL YOUR DOCTOR IF YOU HAVE ANY OF THE FOLLOWING:
Bleeding that does not stop Bleeding that does not stop/Fever greater than (need to indicate Fahrenheit or Celsius)/Nausea and vomiting that does not stop/Inability to tolerate liquids or foods

## 2021-11-04 NOTE — ASU DISCHARGE PLAN (ADULT/PEDIATRIC) - NURSING INSTRUCTIONS
You received IV Tylenol for pain management at _230 PM__. Please DO NOT take any Tylenol (Acetaminophen) containing products, such as Vicodin, Percocet, Excedrin, and cold medications for the next 6 hours (until __8:30_ PM). DO NOT TAKE MORE THAN 3000 MG OF TYLENOL in a 24 hour period.

## 2021-11-04 NOTE — BRIEF OPERATIVE NOTE - NSICDXBRIEFPROCEDURE_GEN_ALL_CORE_FT
PROCEDURES:  Endoscopy, nasal, with biopsy 04-Nov-2021 16:22:12  Evy Demarco  Laryngoscopy, direct, with biopsy if indicated 04-Nov-2021 16:22:29  Evy Demarco

## 2021-11-05 ENCOUNTER — RESULT REVIEW (OUTPATIENT)
Age: 26
End: 2021-11-05

## 2021-11-05 LAB — TM INTERPRETATION: SIGNIFICANT CHANGE UP

## 2021-11-05 PROCEDURE — 88367 INSITU HYBRIDIZATION AUTO: CPT | Mod: 26

## 2021-11-05 PROCEDURE — 88360 TUMOR IMMUNOHISTOCHEM/MANUAL: CPT | Mod: 26

## 2021-11-05 PROCEDURE — 88365 INSITU HYBRIDIZATION (FISH): CPT | Mod: 26,59

## 2021-11-05 PROCEDURE — 88341 IMHCHEM/IMCYTCHM EA ADD ANTB: CPT | Mod: 26,59

## 2021-11-05 PROCEDURE — 88342 IMHCHEM/IMCYTCHM 1ST ANTB: CPT | Mod: 26,59

## 2021-11-09 ENCOUNTER — OUTPATIENT (OUTPATIENT)
Dept: OUTPATIENT SERVICES | Facility: HOSPITAL | Age: 26
LOS: 1 days | Discharge: HOME | End: 2021-11-09

## 2021-11-09 DIAGNOSIS — Z95.828 PRESENCE OF OTHER VASCULAR IMPLANTS AND GRAFTS: Chronic | ICD-10-CM

## 2021-11-09 DIAGNOSIS — Z98.890 OTHER SPECIFIED POSTPROCEDURAL STATES: Chronic | ICD-10-CM

## 2021-11-09 DIAGNOSIS — C81.70 OTHER HODGKIN LYMPHOMA, UNSPECIFIED SITE: ICD-10-CM

## 2021-11-11 ENCOUNTER — APPOINTMENT (OUTPATIENT)
Dept: INFUSION THERAPY | Facility: HOSPITAL | Age: 26
End: 2021-11-11

## 2021-11-11 ENCOUNTER — LABORATORY RESULT (OUTPATIENT)
Age: 26
End: 2021-11-11

## 2021-11-11 ENCOUNTER — RESULT REVIEW (OUTPATIENT)
Age: 26
End: 2021-11-11

## 2021-11-11 ENCOUNTER — APPOINTMENT (OUTPATIENT)
Dept: RADIATION ONCOLOGY | Facility: CLINIC | Age: 26
End: 2021-11-11
Payer: COMMERCIAL

## 2021-11-11 VITALS
RESPIRATION RATE: 16 BRPM | BODY MASS INDEX: 37.79 KG/M2 | WEIGHT: 269.95 LBS | HEART RATE: 75 BPM | SYSTOLIC BLOOD PRESSURE: 121 MMHG | TEMPERATURE: 97 F | DIASTOLIC BLOOD PRESSURE: 77 MMHG | HEIGHT: 71 IN | OXYGEN SATURATION: 99 %

## 2021-11-11 LAB
BASOPHILS # BLD AUTO: 0.02 K/UL — SIGNIFICANT CHANGE UP (ref 0–0.2)
BASOPHILS NFR BLD AUTO: 0.4 % — SIGNIFICANT CHANGE UP (ref 0–2)
EOSINOPHIL # BLD AUTO: 0.09 K/UL — SIGNIFICANT CHANGE UP (ref 0–0.5)
EOSINOPHIL NFR BLD AUTO: 1.7 % — SIGNIFICANT CHANGE UP (ref 0–6)
HCT VFR BLD CALC: 38.5 % — SIGNIFICANT CHANGE UP (ref 34.5–45)
HEMATOPATHOLOGY REPORT: SIGNIFICANT CHANGE UP
HGB BLD-MCNC: 13 G/DL — SIGNIFICANT CHANGE UP (ref 11.5–15.5)
IMM GRANULOCYTES NFR BLD AUTO: 0.4 % — SIGNIFICANT CHANGE UP (ref 0–1.5)
LYMPHOCYTES # BLD AUTO: 1.01 K/UL — SIGNIFICANT CHANGE UP (ref 1–3.3)
LYMPHOCYTES # BLD AUTO: 18.6 % — SIGNIFICANT CHANGE UP (ref 13–44)
MCHC RBC-ENTMCNC: 33.2 PG — SIGNIFICANT CHANGE UP (ref 27–34)
MCHC RBC-ENTMCNC: 33.8 G/DL — SIGNIFICANT CHANGE UP (ref 32–36)
MCV RBC AUTO: 98.2 FL — SIGNIFICANT CHANGE UP (ref 80–100)
MONOCYTES # BLD AUTO: 0.34 K/UL — SIGNIFICANT CHANGE UP (ref 0–0.9)
MONOCYTES NFR BLD AUTO: 6.3 % — SIGNIFICANT CHANGE UP (ref 2–14)
NEUTROPHILS # BLD AUTO: 3.96 K/UL — SIGNIFICANT CHANGE UP (ref 1.8–7.4)
NEUTROPHILS NFR BLD AUTO: 72.6 % — SIGNIFICANT CHANGE UP (ref 43–77)
NRBC # BLD: 0 /100 WBCS — SIGNIFICANT CHANGE UP (ref 0–0)
PLATELET # BLD AUTO: 258 K/UL — SIGNIFICANT CHANGE UP (ref 150–400)
RBC # BLD: 3.92 M/UL — SIGNIFICANT CHANGE UP (ref 3.8–5.2)
RBC # FLD: 12.1 % — SIGNIFICANT CHANGE UP (ref 10.3–14.5)
WBC # BLD: 5.44 K/UL — SIGNIFICANT CHANGE UP (ref 3.8–10.5)
WBC # FLD AUTO: 5.44 K/UL — SIGNIFICANT CHANGE UP (ref 3.8–10.5)

## 2021-11-11 PROCEDURE — 99213 OFFICE O/P EST LOW 20 MIN: CPT | Mod: GC

## 2021-11-11 NOTE — REVIEW OF SYSTEMS
[Dysphagia: Grade 0] : Dysphagia: Grade 0 [Fatigue: Grade 0] : Fatigue: Grade 0 [Skin Hyperpigmentation: Grade 0] : Skin Hyperpigmentation: Grade 0 [Dermatitis Radiation: Grade 0] : Dermatitis Radiation: Grade 0

## 2021-11-12 ENCOUNTER — APPOINTMENT (OUTPATIENT)
Dept: INTERNAL MEDICINE | Facility: CLINIC | Age: 26
End: 2021-11-12
Payer: COMMERCIAL

## 2021-11-12 VITALS
OXYGEN SATURATION: 97 % | SYSTOLIC BLOOD PRESSURE: 126 MMHG | WEIGHT: 266 LBS | HEIGHT: 71 IN | BODY MASS INDEX: 37.24 KG/M2 | DIASTOLIC BLOOD PRESSURE: 80 MMHG | TEMPERATURE: 97.9 F | RESPIRATION RATE: 16 BRPM | HEART RATE: 72 BPM

## 2021-11-12 DIAGNOSIS — Z79.899 OTHER LONG TERM (CURRENT) DRUG THERAPY: ICD-10-CM

## 2021-11-12 DIAGNOSIS — Z82.49 FAMILY HISTORY OF ISCHEMIC HEART DISEASE AND OTHER DISEASES OF THE CIRCULATORY SYSTEM: ICD-10-CM

## 2021-11-12 DIAGNOSIS — Z87.898 PERSONAL HISTORY OF OTHER SPECIFIED CONDITIONS: ICD-10-CM

## 2021-11-12 DIAGNOSIS — Z83.438 FAMILY HISTORY OF OTHER DISORDER OF LIPOPROTEIN METABOLISM AND OTHER LIPIDEMIA: ICD-10-CM

## 2021-11-12 DIAGNOSIS — M51.26 OTHER INTERVERTEBRAL DISC DISPLACEMENT, LUMBAR REGION: ICD-10-CM

## 2021-11-12 DIAGNOSIS — Z13.6 ENCOUNTER FOR SCREENING FOR CARDIOVASCULAR DISORDERS: ICD-10-CM

## 2021-11-12 DIAGNOSIS — Z13.228 ENCOUNTER FOR SCREENING FOR OTHER METABOLIC DISORDERS: ICD-10-CM

## 2021-11-12 DIAGNOSIS — Z01.818 ENCOUNTER FOR OTHER PREPROCEDURAL EXAMINATION: ICD-10-CM

## 2021-11-12 DIAGNOSIS — E87.0 HYPEROSMOLALITY AND HYPERNATREMIA: ICD-10-CM

## 2021-11-12 DIAGNOSIS — R59.0 LOCALIZED ENLARGED LYMPH NODES: ICD-10-CM

## 2021-11-12 DIAGNOSIS — Z83.49 FAMILY HISTORY OF OTHER ENDOCRINE, NUTRITIONAL AND METABOLIC DISEASES: ICD-10-CM

## 2021-11-12 PROCEDURE — 36415 COLL VENOUS BLD VENIPUNCTURE: CPT

## 2021-11-12 PROCEDURE — 99385 PREV VISIT NEW AGE 18-39: CPT | Mod: 25

## 2021-11-15 LAB — CHROM ANALY OVERALL INTERP SPEC-IMP: SIGNIFICANT CHANGE UP

## 2021-11-15 NOTE — HISTORY OF PRESENT ILLNESS
[FreeTextEntry1] : Ms Concepcion is a 27 yo female with newly diagnosed [place] Stage II unfavorable Hodgkins lymphoma involving the bilateral cervical supraclavicular, axilla, mediastinal lymph nodes as well as IMNs. s/p 4 cycles ABVD in May 2020 with Dr Franko Mayer\par She completed 3000 cGy radiation therapy to the left neck on 8/10/2020.\par \par 10/8/21 CT Neck: No interval change from PET CT of 11/6/2020, no new large or necrotic lymphadenopathy.\par 10/8/21: CT C/A/P: No lymphadenopathy in the chest abdomen or pelvis. \par 10/8/21 PET/CT: Compared to FDG-PET/CT scan dated 11/6/2020:\par \par 1. Nonspecific, symmetric increased FDG activity in the palatine tonsils, not significantly changed. Differential diagnosis includes infection, inflammation, and lymphoid hyperplasia. Neoplasm is less likely. Further evaluation may be performed with direct visualization.\par \par 2. New mildly FDG-avid focus of skin thickening, medial aspect upper left thigh, is nonspecific, likely inflammatory. Please correlate with direct visualization.\par \par 3. Small left upper lobe groundglass pulmonary opacity, decreased in size as compared to prior study.\par \par Returns for F/U. Doing well. Denies pain. Regarding the inflammation noted on imaging, saw Dr. Sanders at direction of her medical oncologist, who subsequently performed biospy that proved negative.

## 2021-11-16 ENCOUNTER — APPOINTMENT (OUTPATIENT)
Dept: OTOLARYNGOLOGY | Facility: CLINIC | Age: 26
End: 2021-11-16
Payer: COMMERCIAL

## 2021-11-16 VITALS
WEIGHT: 266 LBS | DIASTOLIC BLOOD PRESSURE: 78 MMHG | SYSTOLIC BLOOD PRESSURE: 113 MMHG | HEART RATE: 73 BPM | BODY MASS INDEX: 37.24 KG/M2 | HEIGHT: 71 IN

## 2021-11-16 DIAGNOSIS — J35.2 HYPERTROPHY OF ADENOIDS: ICD-10-CM

## 2021-11-16 PROCEDURE — 99213 OFFICE O/P EST LOW 20 MIN: CPT | Mod: 25

## 2021-11-16 PROCEDURE — 31231 NASAL ENDOSCOPY DX: CPT

## 2021-11-16 NOTE — PHYSICAL EXAM
[Nasal Endoscopy Performed] : nasal endoscopy was performed, see procedure section for findings [] : septum deviated to the right [Laryngoscopy Performed] : laryngoscopy was performed, see procedure section for findings [Normal] : no rashes [FreeTextEntry1] : No concerning lesions in the OC/OPx on inspection or palpation. 2+ cryptic tonsils.

## 2021-11-16 NOTE — HISTORY OF PRESENT ILLNESS
[de-identified] : pt with hx of lymphoma in 12/2019 tx with chemo and rad 8/10/2020 and refer by Dr. Echeverria for abnormal imaging. ct of neck on 10/13/2021 showed No gross CT evidence of cervical lymphadenopathy and Mild, bilaterally symmetric enlargement of the palatine tonsils . PET ct 10/2021 Nonspecific, symmetric increased FDG activity in the palatine tonsils, not significantly changed.  pt is post D&L and nasal endoscopy biopsy on 11/4/2021.

## 2021-11-16 NOTE — PROCEDURE
[Posterior Lesion] : posterior lesion [None] : none [Flexible Endoscope] : examined with the flexible endoscope [Serial Number: ___] : Serial Number: [unfilled] [FreeTextEntry6] : There site of biopsy is healing well, the adenoid tissue is no longer prominent. No evidence of sinonasal inflammatory disease, no evidence of drainage from OMC, slight right septal spur. \par

## 2021-11-23 NOTE — HEALTH RISK ASSESSMENT
[] : No [Yes] : Yes [No] : In the past 12 months have you used drugs other than those required for medical reasons? No [0] : 2) Feeling down, depressed, or hopeless: Not at all (0) [PHQ-2 Negative - No further assessment needed] : PHQ-2 Negative - No further assessment needed [[id number]] : 0 [Patient reported PAP Smear was normal] : Patient reported PAP Smear was normal [Change in mental status noted] : No change in mental status noted [None] : None [With Family] : lives with family [Employed] : employed [] :  [Sexually Active] : sexually active [High Risk Behavior] : no high risk behavior [Feels Safe at Home] : Feels safe at home [Reports changes in hearing] : Reports no changes in hearing [Reports changes in vision] : Reports no changes in vision [Reports changes in dental health] : Reports no changes in dental health [PapSmearDate] : 12/2020

## 2021-11-23 NOTE — PHYSICAL EXAM
[No Acute Distress] : no acute distress [Well Nourished] : well nourished [Well Developed] : well developed [Well-Appearing] : well-appearing [Normal Sclera/Conjunctiva] : normal sclera/conjunctiva [PERRL] : pupils equal round and reactive to light [EOMI] : extraocular movements intact [Normal Outer Ear/Nose] : the outer ears and nose were normal in appearance [Normal Oropharynx] : the oropharynx was normal [Normal TMs] : both tympanic membranes were normal [Normal Nasal Mucosa] : the nasal mucosa was normal [No JVD] : no jugular venous distention [No Lymphadenopathy] : no lymphadenopathy [Supple] : supple [Thyroid Normal, No Nodules] : the thyroid was normal and there were no nodules present [No Respiratory Distress] : no respiratory distress  [No Accessory Muscle Use] : no accessory muscle use [Clear to Auscultation] : lungs were clear to auscultation bilaterally [Normal Rate] : normal rate  [Regular Rhythm] : with a regular rhythm [Normal S1, S2] : normal S1 and S2 [No Murmur] : no murmur heard [No Carotid Bruits] : no carotid bruits [No Abdominal Bruit] : a ~M bruit was not heard ~T in the abdomen [No Varicosities] : no varicosities [Pedal Pulses Present] : the pedal pulses are present [No Edema] : there was no peripheral edema [No Palpable Aorta] : no palpable aorta [No Extremity Clubbing/Cyanosis] : no extremity clubbing/cyanosis [Soft] : abdomen soft [Non Tender] : non-tender [Non-distended] : non-distended [No Masses] : no abdominal mass palpated [No HSM] : no HSM [Normal Bowel Sounds] : normal bowel sounds [Normal Posterior Cervical Nodes] : no posterior cervical lymphadenopathy [Normal Anterior Cervical Nodes] : no anterior cervical lymphadenopathy [No CVA Tenderness] : no CVA  tenderness [No Spinal Tenderness] : no spinal tenderness [No Joint Swelling] : no joint swelling [Grossly Normal Strength/Tone] : grossly normal strength/tone [Coordination Grossly Intact] : coordination grossly intact [No Rash] : no rash [No Focal Deficits] : no focal deficits [Normal Gait] : normal gait [Deep Tendon Reflexes (DTR)] : deep tendon reflexes were 2+ and symmetric [Speech Grossly Normal] : speech grossly normal [Memory Grossly Normal] : memory grossly normal [Normal Affect] : the affect was normal [Alert and Oriented x3] : oriented to person, place, and time [Normal Mood] : the mood was normal [Normal Insight/Judgement] : insight and judgment were intact

## 2021-11-23 NOTE — HISTORY OF PRESENT ILLNESS
[de-identified] : 25 y/o female patient is new to the office presents today for annual CPE/fasting labs\par - HCM:last PAP 12/2020 WNL \par - hx of lymphoma in 12/2019 tx with chemo and rad 8/10/2020- following with Oncology and ENT -. CT of neck on 10/13/2021 showed No gross CT evidence of cervical lymphadenopathy and Mild, bilaterally symmetric enlargement of the palatine tonsils . PET CT 10/2021 Nonspecific, symmetric increased FDG activity in the palatine tonsils, not significantly changed. pt is post D&L and nasal endoscopy biopsy on 11/4/2021. \par \par

## 2021-11-23 NOTE — COUNSELING
[Potential consequences of obesity discussed] : Potential consequences of obesity discussed [Encouraged to increase physical activity] : Encouraged to increase physical activity [Benefits of weight loss discussed] : Benefits of weight loss discussed [Weigh Self Weekly] : weigh self weekly [Decrease Portions] : decrease portions [Good understanding] : Patient has a good understanding of disease, goals and obesity follow-up plan

## 2021-11-24 ENCOUNTER — TRANSCRIPTION ENCOUNTER (OUTPATIENT)
Age: 26
End: 2021-11-24

## 2021-11-24 LAB
25(OH)D3 SERPL-MCNC: 28.1 NG/ML
APPEARANCE: ABNORMAL
BACTERIA: NEGATIVE
BILIRUBIN URINE: NEGATIVE
BLOOD URINE: NEGATIVE
CHOLEST SERPL-MCNC: 214 MG/DL
COLOR: YELLOW
ESTIMATED AVERAGE GLUCOSE: 97 MG/DL
GLUCOSE QUALITATIVE U: NEGATIVE
HBA1C MFR BLD HPLC: 5 %
HDLC SERPL-MCNC: 52 MG/DL
HYALINE CASTS: 0 /LPF
IRON SATN MFR SERPL: 42 %
IRON SERPL-MCNC: 181 UG/DL
KETONES URINE: NEGATIVE
LDLC SERPL CALC-MCNC: 139 MG/DL
LEUKOCYTE ESTERASE URINE: NEGATIVE
MICROSCOPIC-UA: NORMAL
NITRITE URINE: NEGATIVE
NONHDLC SERPL-MCNC: 162 MG/DL
PH URINE: 5
PROTEIN URINE: NEGATIVE
RED BLOOD CELLS URINE: 2 /HPF
SPECIFIC GRAVITY URINE: >=1.03
SQUAMOUS EPITHELIAL CELLS: 2 /HPF
T3FREE SERPL-MCNC: 3.11 PG/ML
T4 FREE SERPL-MCNC: 0.9 NG/DL
THYROGLOB AB SERPL-ACNC: <20 IU/ML
THYROPEROXIDASE AB SERPL IA-ACNC: <10 IU/ML
TIBC SERPL-MCNC: 431 UG/DL
TRIGL SERPL-MCNC: 119 MG/DL
TSH SERPL-ACNC: 1.77 UIU/ML
UIBC SERPL-MCNC: 250 UG/DL
UROBILINOGEN URINE: NORMAL
VIT B12 SERPL-MCNC: 440 PG/ML
WHITE BLOOD CELLS URINE: 2 /HPF

## 2021-12-03 ENCOUNTER — APPOINTMENT (OUTPATIENT)
Dept: INTERNAL MEDICINE | Facility: CLINIC | Age: 26
End: 2021-12-03
Payer: COMMERCIAL

## 2021-12-03 VITALS
HEIGHT: 71 IN | RESPIRATION RATE: 16 BRPM | WEIGHT: 267 LBS | DIASTOLIC BLOOD PRESSURE: 82 MMHG | OXYGEN SATURATION: 97 % | BODY MASS INDEX: 37.38 KG/M2 | SYSTOLIC BLOOD PRESSURE: 116 MMHG | HEART RATE: 84 BPM | TEMPERATURE: 97.8 F

## 2021-12-03 PROCEDURE — 99214 OFFICE O/P EST MOD 30 MIN: CPT | Mod: 25

## 2021-12-03 PROCEDURE — G0447 BEHAVIOR COUNSEL OBESITY 15M: CPT | Mod: 59

## 2021-12-09 NOTE — HISTORY OF PRESENT ILLNESS
[de-identified] : 25 y/o female patient presents today:\par - f/u weight management/obesity -patient states has tried weight watchers, keto in past has been able to lose weight but then difficult to maintain, states is more motivated will be getting , wedding in May 2022 Memorial day weekend. Wants to discuss weight loss medications, diet

## 2021-12-21 ENCOUNTER — OUTPATIENT (OUTPATIENT)
Dept: OUTPATIENT SERVICES | Facility: HOSPITAL | Age: 26
LOS: 1 days | Discharge: HOME | End: 2021-12-21

## 2021-12-21 DIAGNOSIS — C81.70 OTHER HODGKIN LYMPHOMA, UNSPECIFIED SITE: ICD-10-CM

## 2021-12-21 DIAGNOSIS — Z95.828 PRESENCE OF OTHER VASCULAR IMPLANTS AND GRAFTS: Chronic | ICD-10-CM

## 2021-12-21 DIAGNOSIS — Z98.890 OTHER SPECIFIED POSTPROCEDURAL STATES: Chronic | ICD-10-CM

## 2021-12-22 ENCOUNTER — APPOINTMENT (OUTPATIENT)
Dept: OBGYN | Facility: CLINIC | Age: 26
End: 2021-12-22
Payer: COMMERCIAL

## 2021-12-22 ENCOUNTER — NON-APPOINTMENT (OUTPATIENT)
Age: 26
End: 2021-12-22

## 2021-12-22 VITALS
SYSTOLIC BLOOD PRESSURE: 115 MMHG | HEIGHT: 71 IN | WEIGHT: 261 LBS | BODY MASS INDEX: 36.54 KG/M2 | DIASTOLIC BLOOD PRESSURE: 76 MMHG

## 2021-12-22 DIAGNOSIS — Z01.419 ENCOUNTER FOR GYNECOLOGICAL EXAMINATION (GENERAL) (ROUTINE) W/OUT ABNORMAL FINDINGS: ICD-10-CM

## 2021-12-22 PROCEDURE — 99395 PREV VISIT EST AGE 18-39: CPT

## 2021-12-22 NOTE — PHYSICAL EXAM
[Appropriately responsive] : appropriately responsive [Alert] : alert [No Acute Distress] : no acute distress [No Lymphadenopathy] : no lymphadenopathy [Regular Rate Rhythm] : regular rate rhythm [No Murmurs] : no murmurs [Clear to Auscultation B/L] : clear to auscultation bilaterally [Soft] : soft [Non-tender] : non-tender [Non-distended] : non-distended [No HSM] : No HSM [No Lesions] : no lesions [No Mass] : no mass [Oriented x3] : oriented x3 [Examination Of The Breasts] : a normal appearance [No Masses] : no breast masses were palpable [Labia Majora] : normal [Labia Minora] : normal [Discharge] : discharge [Normal] : normal [Uterine Adnexae] : normal [FreeTextEntry5] : cervical eversion

## 2021-12-23 ENCOUNTER — RESULT REVIEW (OUTPATIENT)
Age: 26
End: 2021-12-23

## 2021-12-23 ENCOUNTER — APPOINTMENT (OUTPATIENT)
Dept: INFUSION THERAPY | Facility: HOSPITAL | Age: 26
End: 2021-12-23

## 2021-12-23 ENCOUNTER — LABORATORY RESULT (OUTPATIENT)
Age: 26
End: 2021-12-23

## 2021-12-23 LAB
BASOPHILS # BLD AUTO: 0.05 K/UL — SIGNIFICANT CHANGE UP (ref 0–0.2)
BASOPHILS NFR BLD AUTO: 0.9 % — SIGNIFICANT CHANGE UP (ref 0–2)
EOSINOPHIL # BLD AUTO: 0.04 K/UL — SIGNIFICANT CHANGE UP (ref 0–0.5)
EOSINOPHIL NFR BLD AUTO: 0.7 % — SIGNIFICANT CHANGE UP (ref 0–6)
HCT VFR BLD CALC: 41.4 % — SIGNIFICANT CHANGE UP (ref 34.5–45)
HGB BLD-MCNC: 14.2 G/DL — SIGNIFICANT CHANGE UP (ref 11.5–15.5)
IMM GRANULOCYTES NFR BLD AUTO: 0.3 % — SIGNIFICANT CHANGE UP (ref 0–1.5)
LYMPHOCYTES # BLD AUTO: 1.11 K/UL — SIGNIFICANT CHANGE UP (ref 1–3.3)
LYMPHOCYTES # BLD AUTO: 19 % — SIGNIFICANT CHANGE UP (ref 13–44)
MCHC RBC-ENTMCNC: 33.6 PG — SIGNIFICANT CHANGE UP (ref 27–34)
MCHC RBC-ENTMCNC: 34.3 G/DL — SIGNIFICANT CHANGE UP (ref 32–36)
MCV RBC AUTO: 97.9 FL — SIGNIFICANT CHANGE UP (ref 80–100)
MONOCYTES # BLD AUTO: 0.32 K/UL — SIGNIFICANT CHANGE UP (ref 0–0.9)
MONOCYTES NFR BLD AUTO: 5.5 % — SIGNIFICANT CHANGE UP (ref 2–14)
NEUTROPHILS # BLD AUTO: 4.31 K/UL — SIGNIFICANT CHANGE UP (ref 1.8–7.4)
NEUTROPHILS NFR BLD AUTO: 73.6 % — SIGNIFICANT CHANGE UP (ref 43–77)
NRBC # BLD: 0 /100 WBCS — SIGNIFICANT CHANGE UP (ref 0–0)
PLATELET # BLD AUTO: 235 K/UL — SIGNIFICANT CHANGE UP (ref 150–400)
RBC # BLD: 4.23 M/UL — SIGNIFICANT CHANGE UP (ref 3.8–5.2)
RBC # FLD: 12.4 % — SIGNIFICANT CHANGE UP (ref 10.3–14.5)
WBC # BLD: 5.85 K/UL — SIGNIFICANT CHANGE UP (ref 3.8–10.5)
WBC # FLD AUTO: 5.85 K/UL — SIGNIFICANT CHANGE UP (ref 3.8–10.5)

## 2021-12-26 DIAGNOSIS — R11.2 NAUSEA WITH VOMITING, UNSPECIFIED: ICD-10-CM

## 2021-12-28 ENCOUNTER — APPOINTMENT (OUTPATIENT)
Dept: HEMATOLOGY ONCOLOGY | Facility: CLINIC | Age: 26
End: 2021-12-28
Payer: COMMERCIAL

## 2021-12-28 PROCEDURE — 99213 OFFICE O/P EST LOW 20 MIN: CPT | Mod: 95

## 2021-12-28 NOTE — ASSESSMENT
[FreeTextEntry1] : 25 years old female with h/o stage IIB unfavorable Hodgkin lymphoma presents today for follow-up. She received 4 cycles of ABVD in 5/2020 followed by 30Gy radiation to the chest and left neck on 8/1/20. Post-treatment PET-CT on 11/5/20 showed complete response, no evidence of FDG avid disease. Presented today for follow-up.\par \par \par # Hodgkin Lymphoma, in remission\par - Given h/o chest radiation, she will need mammogram eight years after radiation. This will be around 8/2028, at her age of 32. Will also consider annual breast MRI in addition to mammogram once start screening.\par - Also consider early colorectal cancer screening at age 35-40.\par -she has received both COVID19 vaccines, she has antibodies >250.00 U/mL, she received her booster in November 2021, COVID19 precautions reviewed \par - Recommend family planning (pregnancy) when she achieves 2-year remission.\par - Patient has been donating blood before cancer diagnosis and wonders when she can restart donating. Recommend she wait for at least 2 more years.\par - Continue mediport flushing every 1-3 months\par -no evidence of disease recurrence on recent PET/CT and CTs \par -plan for scans every 6 months for 2 years since last chemo, next due in April 2022 \par -RTO in 3 months, sooner PRN\par

## 2021-12-28 NOTE — HISTORY OF PRESENT ILLNESS
[Disease:__________________________] : Disease: [unfilled] [de-identified] : 25 years old female with h/o stage IIB unfavorable Hodgkin lymphoma presents today for follow-up. Patient transferred her care to us as Dr. Ochoa left [facility]. Back in 11/2019 she noticed neck adenopathy associated with skin rash and pruritus. She was evaluated by an ENT physician who performed an excisional left cervical lymph node biopsy which confirmed the diagnosis of classical Hodgkin lymphoma. She finished 4 cycles of ABVD in 5/2020 followed by 30Gy radiation to the chest and left neck on 8/1/20. Post-treatment PET-CT on 11/5/20 showed complete response, no evidence of FDG avid disease. Repeat TTE on 1/5/21 showed normal findings and EF of 70%.\par \par  [de-identified] : IIB [de-identified] : Since her last follow up she has been feeling well, however she endorses 1 to 2 episode of night sweats a week-she is not sure if its related to her thyroid. She had been exercising more and eating healthier to loose weight, however has a hard time loosing the weight. She has noticed some worsening of her neuropathy now that the weather is cold. She notices it mostly in her finger tips. She notices it mostly when she is exercising and when she is doing lots of things around the house. It is not interfering with her ability to work or type. \par No fevers/chills. No fatigue. No drenching nights sweats. No enlarged lymph nodes. Appetite is good. Weight stable. No CP/SOB. No easy bruising/bleeding. No GI/ issues. No recent/recurrent infections. No new medications.

## 2021-12-29 LAB — CYTOLOGY CVX/VAG DOC THIN PREP: NORMAL

## 2021-12-30 ENCOUNTER — TRANSCRIPTION ENCOUNTER (OUTPATIENT)
Age: 26
End: 2021-12-30

## 2022-01-07 ENCOUNTER — APPOINTMENT (OUTPATIENT)
Dept: INTERNAL MEDICINE | Facility: CLINIC | Age: 27
End: 2022-01-07

## 2022-01-07 ENCOUNTER — APPOINTMENT (OUTPATIENT)
Dept: INTERNAL MEDICINE | Facility: CLINIC | Age: 27
End: 2022-01-07
Payer: COMMERCIAL

## 2022-01-07 VITALS
RESPIRATION RATE: 16 BRPM | HEART RATE: 103 BPM | OXYGEN SATURATION: 97 % | HEIGHT: 71 IN | SYSTOLIC BLOOD PRESSURE: 120 MMHG | WEIGHT: 255.38 LBS | BODY MASS INDEX: 35.75 KG/M2 | TEMPERATURE: 98 F | DIASTOLIC BLOOD PRESSURE: 87 MMHG

## 2022-01-07 PROCEDURE — G0447 BEHAVIOR COUNSEL OBESITY 15M: CPT | Mod: 59

## 2022-01-07 PROCEDURE — 99214 OFFICE O/P EST MOD 30 MIN: CPT | Mod: 25

## 2022-01-17 NOTE — HISTORY OF PRESENT ILLNESS
[de-identified] : 25 y/o female patient presents today:\par - f/u weight management/obesity - patient currently on Phentermine 15mg and Topamax 25mg daily, denies any overt side effects, has been decreasing portions, decreased appetite, increased physical activity. Total weight loss: 12 lbs

## 2022-02-01 ENCOUNTER — OUTPATIENT (OUTPATIENT)
Dept: OUTPATIENT SERVICES | Facility: HOSPITAL | Age: 27
LOS: 1 days | Discharge: HOME | End: 2022-02-01

## 2022-02-01 DIAGNOSIS — Z95.828 PRESENCE OF OTHER VASCULAR IMPLANTS AND GRAFTS: Chronic | ICD-10-CM

## 2022-02-01 DIAGNOSIS — C81.70 OTHER HODGKIN LYMPHOMA, UNSPECIFIED SITE: ICD-10-CM

## 2022-02-01 DIAGNOSIS — Z98.890 OTHER SPECIFIED POSTPROCEDURAL STATES: Chronic | ICD-10-CM

## 2022-02-04 ENCOUNTER — APPOINTMENT (OUTPATIENT)
Dept: INFUSION THERAPY | Facility: HOSPITAL | Age: 27
End: 2022-02-04

## 2022-02-07 ENCOUNTER — APPOINTMENT (OUTPATIENT)
Dept: INTERNAL MEDICINE | Facility: CLINIC | Age: 27
End: 2022-02-07
Payer: COMMERCIAL

## 2022-02-07 VITALS
HEIGHT: 71 IN | TEMPERATURE: 97.4 F | HEART RATE: 60 BPM | OXYGEN SATURATION: 98 % | SYSTOLIC BLOOD PRESSURE: 115 MMHG | DIASTOLIC BLOOD PRESSURE: 78 MMHG | WEIGHT: 253.19 LBS | BODY MASS INDEX: 35.44 KG/M2 | RESPIRATION RATE: 16 BRPM

## 2022-02-07 PROCEDURE — G0447 BEHAVIOR COUNSEL OBESITY 15M: CPT | Mod: 59

## 2022-02-07 PROCEDURE — 99213 OFFICE O/P EST LOW 20 MIN: CPT | Mod: 25

## 2022-02-08 ENCOUNTER — TRANSCRIPTION ENCOUNTER (OUTPATIENT)
Age: 27
End: 2022-02-08

## 2022-02-27 NOTE — COUNSELING
[Potential consequences of obesity discussed] : Potential consequences of obesity discussed [Benefits of weight loss discussed] : Benefits of weight loss discussed [Structured Weight Management Program suggested:] : Structured weight management program suggested [Encouraged to maintain food diary] : Encouraged to maintain food diary [Encouraged to increase physical activity] : Encouraged to increase physical activity [Encouraged to use exercise tracking device] : Encouraged to use exercise tracking device [Weigh Self Weekly] : weigh self weekly [Decrease Portions] : decrease portions [____ min/wk Activity] : [unfilled] min/wk activity [FreeTextEntry1] : Weight loss medications  [FreeTextEntry4] : 15

## 2022-02-27 NOTE — HISTORY OF PRESENT ILLNESS
[de-identified] : 27 y/o female patient presents today:\par - f/u weight management/obesity - patient currently on Phentermine 15mg and Topamax 25mg daily, denies any overt side effects, has been decreasing portions, decreased appetite, increased physical activity. \par - Weight loss since last visit: 2 lbs Total weight loss: 14 lbs

## 2022-03-02 ENCOUNTER — RX RENEWAL (OUTPATIENT)
Age: 27
End: 2022-03-02

## 2022-03-09 ENCOUNTER — APPOINTMENT (OUTPATIENT)
Dept: INTERNAL MEDICINE | Facility: CLINIC | Age: 27
End: 2022-03-09
Payer: COMMERCIAL

## 2022-03-09 VITALS
HEIGHT: 71 IN | TEMPERATURE: 97.8 F | SYSTOLIC BLOOD PRESSURE: 126 MMHG | DIASTOLIC BLOOD PRESSURE: 83 MMHG | RESPIRATION RATE: 16 BRPM | BODY MASS INDEX: 34.27 KG/M2 | OXYGEN SATURATION: 98 % | HEART RATE: 81 BPM | WEIGHT: 244.8 LBS

## 2022-03-09 PROCEDURE — 99214 OFFICE O/P EST MOD 30 MIN: CPT | Mod: 25

## 2022-03-09 PROCEDURE — G0447 BEHAVIOR COUNSEL OBESITY 15M: CPT | Mod: 59

## 2022-03-14 NOTE — COUNSELING
[Potential consequences of obesity discussed] : Potential consequences of obesity discussed [Benefits of weight loss discussed] : Benefits of weight loss discussed [Structured Weight Management Program suggested:] : Structured weight management program suggested [Encouraged to maintain food diary] : Encouraged to maintain food diary [Encouraged to increase physical activity] : Encouraged to increase physical activity [Encouraged to use exercise tracking device] : Encouraged to use exercise tracking device [Weigh Self Weekly] : weigh self weekly [Decrease Portions] : decrease portions [____ min/wk Activity] : [unfilled] min/wk activity [Good understanding] : Patient has a good understanding of disease, goals and obesity follow-up plan [FreeTextEntry1] : Weight loss medication [FreeTextEntry4] : 15

## 2022-03-14 NOTE — HISTORY OF PRESENT ILLNESS
[de-identified] : 27 y/o female patient presents today:\par - f/u weight management/obesity - patient currently on Phentermine 15mg and Topamax 25mg daily, denies any overt side effects, has been decreasing portions, decreased appetite, increased physical activity. Getting  in May \par - Weight loss since last visit: 9 lbs Total weight loss: 23 lbs

## 2022-03-16 ENCOUNTER — OUTPATIENT (OUTPATIENT)
Dept: OUTPATIENT SERVICES | Facility: HOSPITAL | Age: 27
LOS: 1 days | Discharge: HOME | End: 2022-03-16

## 2022-03-16 DIAGNOSIS — Z95.828 PRESENCE OF OTHER VASCULAR IMPLANTS AND GRAFTS: Chronic | ICD-10-CM

## 2022-03-16 DIAGNOSIS — Z98.890 OTHER SPECIFIED POSTPROCEDURAL STATES: Chronic | ICD-10-CM

## 2022-03-16 DIAGNOSIS — C81.70 OTHER HODGKIN LYMPHOMA, UNSPECIFIED SITE: ICD-10-CM

## 2022-03-18 ENCOUNTER — RESULT REVIEW (OUTPATIENT)
Age: 27
End: 2022-03-18

## 2022-03-18 ENCOUNTER — APPOINTMENT (OUTPATIENT)
Dept: INFUSION THERAPY | Facility: HOSPITAL | Age: 27
End: 2022-03-18

## 2022-03-18 LAB
ALBUMIN SERPL ELPH-MCNC: 4.6 G/DL — SIGNIFICANT CHANGE UP (ref 3.3–5)
ALP SERPL-CCNC: 76 U/L — SIGNIFICANT CHANGE UP (ref 40–120)
ALT FLD-CCNC: 20 U/L — SIGNIFICANT CHANGE UP (ref 10–45)
ANION GAP SERPL CALC-SCNC: 13 MMOL/L — SIGNIFICANT CHANGE UP (ref 5–17)
AST SERPL-CCNC: 28 U/L — SIGNIFICANT CHANGE UP (ref 10–40)
BILIRUB SERPL-MCNC: 0.4 MG/DL — SIGNIFICANT CHANGE UP (ref 0.2–1.2)
BUN SERPL-MCNC: 9 MG/DL — SIGNIFICANT CHANGE UP (ref 7–23)
CALCIUM SERPL-MCNC: 9.1 MG/DL — SIGNIFICANT CHANGE UP (ref 8.4–10.5)
CHLORIDE SERPL-SCNC: 104 MMOL/L — SIGNIFICANT CHANGE UP (ref 96–108)
CO2 SERPL-SCNC: 22 MMOL/L — SIGNIFICANT CHANGE UP (ref 22–31)
CREAT SERPL-MCNC: 0.76 MG/DL — SIGNIFICANT CHANGE UP (ref 0.5–1.3)
EGFR: 109 ML/MIN/1.73M2 — SIGNIFICANT CHANGE UP
GLUCOSE SERPL-MCNC: 96 MG/DL — SIGNIFICANT CHANGE UP (ref 70–99)
HCT VFR BLD CALC: 40.2 % — SIGNIFICANT CHANGE UP (ref 34.5–45)
HGB BLD-MCNC: 13.6 G/DL — SIGNIFICANT CHANGE UP (ref 11.5–15.5)
LDH SERPL L TO P-CCNC: 162 U/L — SIGNIFICANT CHANGE UP (ref 50–242)
MAGNESIUM SERPL-MCNC: 2.1 MG/DL — SIGNIFICANT CHANGE UP (ref 1.6–2.6)
MCHC RBC-ENTMCNC: 33.7 PG — SIGNIFICANT CHANGE UP (ref 27–34)
MCHC RBC-ENTMCNC: 33.8 G/DL — SIGNIFICANT CHANGE UP (ref 32–36)
MCV RBC AUTO: 99.5 FL — SIGNIFICANT CHANGE UP (ref 80–100)
PHOSPHATE SERPL-MCNC: 2.9 MG/DL — SIGNIFICANT CHANGE UP (ref 2.5–4.5)
PLATELET # BLD AUTO: 218 K/UL — SIGNIFICANT CHANGE UP (ref 150–400)
POTASSIUM SERPL-MCNC: 3.7 MMOL/L — SIGNIFICANT CHANGE UP (ref 3.5–5.3)
POTASSIUM SERPL-SCNC: 3.7 MMOL/L — SIGNIFICANT CHANGE UP (ref 3.5–5.3)
PROT SERPL-MCNC: 6.9 G/DL — SIGNIFICANT CHANGE UP (ref 6–8.3)
RBC # BLD: 4.04 M/UL — SIGNIFICANT CHANGE UP (ref 3.8–5.2)
RBC # FLD: 12.4 % — SIGNIFICANT CHANGE UP (ref 10.3–14.5)
SODIUM SERPL-SCNC: 139 MMOL/L — SIGNIFICANT CHANGE UP (ref 135–145)
URATE SERPL-MCNC: 4.6 MG/DL — SIGNIFICANT CHANGE UP (ref 2.5–7)
WBC # BLD: 4.63 K/UL — SIGNIFICANT CHANGE UP (ref 3.8–10.5)
WBC # FLD AUTO: 4.63 K/UL — SIGNIFICANT CHANGE UP (ref 3.8–10.5)

## 2022-03-21 DIAGNOSIS — R11.2 NAUSEA WITH VOMITING, UNSPECIFIED: ICD-10-CM

## 2022-03-31 ENCOUNTER — APPOINTMENT (OUTPATIENT)
Dept: HEMATOLOGY ONCOLOGY | Facility: CLINIC | Age: 27
End: 2022-03-31
Payer: COMMERCIAL

## 2022-03-31 VITALS
OXYGEN SATURATION: 98 % | WEIGHT: 239 LBS | HEART RATE: 71 BPM | DIASTOLIC BLOOD PRESSURE: 72 MMHG | SYSTOLIC BLOOD PRESSURE: 120 MMHG | BODY MASS INDEX: 33.33 KG/M2 | TEMPERATURE: 98.5 F | RESPIRATION RATE: 15 BRPM

## 2022-03-31 PROCEDURE — 85025 COMPLETE CBC W/AUTO DIFF WBC: CPT | Mod: GC

## 2022-03-31 PROCEDURE — 99213 OFFICE O/P EST LOW 20 MIN: CPT | Mod: GC

## 2022-03-31 NOTE — ASSESSMENT
[FreeTextEntry1] : 26 years old female with h/o stage IIB unfavorable Hodgkin lymphoma presents today for follow-up. She received 4 cycles of ABVD in 5/2020 followed by 30Gy radiation to the chest and left neck on 8/1/20. Post-treatment PET-CT on 11/5/20 showed complete response, no evidence of FDG avid disease. Presented today for follow-up.\par \par \par # Hodgkin Lymphoma, in remission\par - Given h/o chest radiation, she will need mammogram eight years after radiation. This will be around 8/2028, at her age of 32. Will also consider annual breast MRI in addition to mammogram once start screening.\par - Also consider early colorectal cancer screening at age 35-40.\par -she has received both COVID19 vaccines, she has antibodies >250.00 U/mL, she received her booster in November 2021, COVID19 precautions reviewed. \par - Recommend family planning (pregnancy) when she achieves 2-year remission.\par - Patient has been donating blood before cancer diagnosis and wonders when she can restart donating. Recommend she wait for at least 2 more years.\par - Continue mediport flushing every 1-3 months\par -no evidence of disease recurrence on recent PET/CT and CTs \par -plan for scans every 6 months for 2 years since last chemo, next due in April 2022 \par -RTO in 3 months, sooner PRN\par

## 2022-03-31 NOTE — HISTORY OF PRESENT ILLNESS
[Disease:__________________________] : Disease: [unfilled] [de-identified] : 26 years old female with h/o stage IIB unfavorable Hodgkin lymphoma presents today for follow-up. Patient transferred her care to us as Dr. Ochoa left [facility]. Back in 11/2019 she noticed neck adenopathy associated with skin rash and pruritus. She was evaluated by an ENT physician who performed an excisional left cervical lymph node biopsy which confirmed the diagnosis of classical Hodgkin lymphoma. She finished 4 cycles of ABVD in 5/2020 followed by 30Gy radiation to the chest and left neck on 8/1/20. Post-treatment PET-CT on 11/5/20 showed complete response, no evidence of FDG avid disease. Repeat TTE on 1/5/21 showed normal findings and EF of 70%.\par \par  [de-identified] : IIB [de-identified] : 3/31/22: \par Since her last follow up she has been feeling well. She will be getting  memorial day weekend. She had been exercising more and eating healthier to loose weight.\par No fevers/chills. No fatigue. No drenching nights sweats. No enlarged lymph nodes. Appetite is good. Weight stable. No CP/SOB. No easy bruising/bleeding. No GI/ issues. No recent/recurrent infections. No new medications.

## 2022-04-01 ENCOUNTER — RESULT REVIEW (OUTPATIENT)
Age: 27
End: 2022-04-01

## 2022-04-01 LAB
ALBUMIN SERPL ELPH-MCNC: 4.7 G/DL
ALP BLD-CCNC: 74 U/L
ALT SERPL-CCNC: 16 U/L
ANION GAP SERPL CALC-SCNC: 13 MMOL/L
AST SERPL-CCNC: 17 U/L
BILIRUB SERPL-MCNC: 0.3 MG/DL
BUN SERPL-MCNC: 11 MG/DL
CALCIUM SERPL-MCNC: 10 MG/DL
CHLORIDE SERPL-SCNC: 104 MMOL/L
CO2 SERPL-SCNC: 21 MMOL/L
COVID-19 SPIKE DOMAIN ANTIBODY INTERPRETATION: POSITIVE
CREAT SERPL-MCNC: 0.83 MG/DL
EGFR: 98 ML/MIN/1.73M2
GLUCOSE SERPL-MCNC: 88 MG/DL
LDH SERPL-CCNC: 145 U/L
MAGNESIUM SERPL-MCNC: 2.1 MG/DL
PHOSPHATE SERPL-MCNC: 2.6 MG/DL
POTASSIUM SERPL-SCNC: 4.3 MMOL/L
PROT SERPL-MCNC: 7.2 G/DL
SARS-COV-2 AB SERPL IA-ACNC: >250 U/ML
SODIUM SERPL-SCNC: 139 MMOL/L
URATE SERPL-MCNC: 4 MG/DL

## 2022-04-05 ENCOUNTER — RX RENEWAL (OUTPATIENT)
Age: 27
End: 2022-04-05

## 2022-04-06 ENCOUNTER — OUTPATIENT (OUTPATIENT)
Dept: OUTPATIENT SERVICES | Facility: HOSPITAL | Age: 27
LOS: 1 days | End: 2022-04-06
Payer: COMMERCIAL

## 2022-04-06 ENCOUNTER — APPOINTMENT (OUTPATIENT)
Dept: CT IMAGING | Facility: IMAGING CENTER | Age: 27
End: 2022-04-06
Payer: COMMERCIAL

## 2022-04-06 DIAGNOSIS — C81.90 HODGKIN LYMPHOMA, UNSPECIFIED, UNSPECIFIED SITE: ICD-10-CM

## 2022-04-06 DIAGNOSIS — Z95.828 PRESENCE OF OTHER VASCULAR IMPLANTS AND GRAFTS: Chronic | ICD-10-CM

## 2022-04-06 DIAGNOSIS — Z98.890 OTHER SPECIFIED POSTPROCEDURAL STATES: Chronic | ICD-10-CM

## 2022-04-06 PROCEDURE — 74177 CT ABD & PELVIS W/CONTRAST: CPT

## 2022-04-06 PROCEDURE — 74177 CT ABD & PELVIS W/CONTRAST: CPT | Mod: 26

## 2022-04-06 PROCEDURE — 71260 CT THORAX DX C+: CPT | Mod: 26

## 2022-04-06 PROCEDURE — 71260 CT THORAX DX C+: CPT

## 2022-04-08 ENCOUNTER — APPOINTMENT (OUTPATIENT)
Dept: INTERNAL MEDICINE | Facility: CLINIC | Age: 27
End: 2022-04-08
Payer: COMMERCIAL

## 2022-04-08 VITALS
BODY MASS INDEX: 33.04 KG/M2 | WEIGHT: 236 LBS | OXYGEN SATURATION: 97 % | HEART RATE: 76 BPM | RESPIRATION RATE: 14 BRPM | TEMPERATURE: 98.2 F | SYSTOLIC BLOOD PRESSURE: 120 MMHG | HEIGHT: 71 IN | DIASTOLIC BLOOD PRESSURE: 82 MMHG

## 2022-04-08 PROCEDURE — G0447 BEHAVIOR COUNSEL OBESITY 15M: CPT

## 2022-04-08 PROCEDURE — 99213 OFFICE O/P EST LOW 20 MIN: CPT | Mod: 25

## 2022-04-25 NOTE — HISTORY OF PRESENT ILLNESS
[de-identified] : 27 y/o female patient presents today:\par - f/u weight management/obesity - patient currently on Phentermine 30mg and Topamax 25mg daily, denies any overt side effects, has been decreasing portions, decreased appetite, increased physical activity. Getting  end of May \par - Weight loss since last visit: 3 lbs Total weight loss: 26 lbs

## 2022-04-25 NOTE — COUNSELING
[Potential consequences of obesity discussed] : Potential consequences of obesity discussed [Benefits of weight loss discussed] : Benefits of weight loss discussed [Encouraged to increase physical activity] : Encouraged to increase physical activity [Weigh Self Weekly] : weigh self weekly [Decrease Portions] : decrease portions [Good understanding] : Patient has a good understanding of disease, goals and obesity follow-up plan [FreeTextEntry4] : 15

## 2022-05-02 ENCOUNTER — OUTPATIENT (OUTPATIENT)
Dept: OUTPATIENT SERVICES | Facility: HOSPITAL | Age: 27
LOS: 1 days | Discharge: HOME | End: 2022-05-02

## 2022-05-02 DIAGNOSIS — Z98.890 OTHER SPECIFIED POSTPROCEDURAL STATES: Chronic | ICD-10-CM

## 2022-05-02 DIAGNOSIS — C81.70 OTHER HODGKIN LYMPHOMA, UNSPECIFIED SITE: ICD-10-CM

## 2022-05-02 DIAGNOSIS — Z95.828 PRESENCE OF OTHER VASCULAR IMPLANTS AND GRAFTS: Chronic | ICD-10-CM

## 2022-05-06 ENCOUNTER — APPOINTMENT (OUTPATIENT)
Dept: INFUSION THERAPY | Facility: HOSPITAL | Age: 27
End: 2022-05-06

## 2022-05-11 ENCOUNTER — APPOINTMENT (OUTPATIENT)
Dept: INTERNAL MEDICINE | Facility: CLINIC | Age: 27
End: 2022-05-11
Payer: COMMERCIAL

## 2022-05-11 VITALS
DIASTOLIC BLOOD PRESSURE: 79 MMHG | TEMPERATURE: 98 F | HEART RATE: 95 BPM | BODY MASS INDEX: 32.62 KG/M2 | HEIGHT: 71 IN | OXYGEN SATURATION: 99 % | SYSTOLIC BLOOD PRESSURE: 114 MMHG | WEIGHT: 233 LBS

## 2022-05-11 PROCEDURE — G0447 BEHAVIOR COUNSEL OBESITY 15M: CPT | Mod: 59

## 2022-05-11 PROCEDURE — 99214 OFFICE O/P EST MOD 30 MIN: CPT | Mod: 25

## 2022-05-23 ENCOUNTER — RX RENEWAL (OUTPATIENT)
Age: 27
End: 2022-05-23

## 2022-05-30 NOTE — HISTORY OF PRESENT ILLNESS
[de-identified] : 25 y/o female patient presents today:\par - f/u weight management/obesity - patient currently on Phentermine 30mg and Topamax 25mg daily, denies any overt side effects, has been decreasing portions, decreased appetite, increased physical activity. Getting  end of May \par - Weight loss since last visit: 3 lbs Total weight loss: 29 lbs

## 2022-06-06 ENCOUNTER — RX RENEWAL (OUTPATIENT)
Age: 27
End: 2022-06-06

## 2022-06-10 ENCOUNTER — APPOINTMENT (OUTPATIENT)
Dept: INTERNAL MEDICINE | Facility: CLINIC | Age: 27
End: 2022-06-10

## 2022-06-10 VITALS
WEIGHT: 227.4 LBS | DIASTOLIC BLOOD PRESSURE: 76 MMHG | RESPIRATION RATE: 14 BRPM | HEIGHT: 71 IN | OXYGEN SATURATION: 97 % | HEART RATE: 91 BPM | TEMPERATURE: 98 F | SYSTOLIC BLOOD PRESSURE: 114 MMHG | BODY MASS INDEX: 31.84 KG/M2

## 2022-06-10 DIAGNOSIS — G43.009 MIGRAINE W/OUT AURA, NOT INTRACTABLE, W/OUT STATUS MIGRAINOSUS: ICD-10-CM

## 2022-06-10 LAB
ALBUMIN SERPL ELPH-MCNC: 4.8 G/DL
ALP BLD-CCNC: 82 U/L
ALT SERPL-CCNC: 16 U/L
ANION GAP SERPL CALC-SCNC: 12 MMOL/L
AST SERPL-CCNC: 21 U/L
BASOPHILS # BLD AUTO: 0.03 K/UL
BASOPHILS NFR BLD AUTO: 0.6 %
BILIRUB SERPL-MCNC: 0.4 MG/DL
BUN SERPL-MCNC: 12 MG/DL
CALCIUM SERPL-MCNC: 9.9 MG/DL
CHLORIDE SERPL-SCNC: 101 MMOL/L
CO2 SERPL-SCNC: 25 MMOL/L
CREAT SERPL-MCNC: 0.83 MG/DL
EGFR: 98 ML/MIN/1.73M2
EOSINOPHIL # BLD AUTO: 0.04 K/UL
EOSINOPHIL NFR BLD AUTO: 0.8 %
GLUCOSE SERPL-MCNC: 95 MG/DL
HCT VFR BLD CALC: 41 %
HGB BLD-MCNC: 13.8 G/DL
IMM GRANULOCYTES NFR BLD AUTO: 0.4 %
INR PPP: 0.97 RATIO
LDH SERPL-CCNC: 168 U/L
LYMPHOCYTES # BLD AUTO: 1.36 K/UL
LYMPHOCYTES NFR BLD AUTO: 26 %
MAGNESIUM SERPL-MCNC: 2.1 MG/DL
MAN DIFF?: NORMAL
MCHC RBC-ENTMCNC: 33.7 GM/DL
MCHC RBC-ENTMCNC: 34.4 PG
MCV RBC AUTO: 102.2 FL
MONOCYTES # BLD AUTO: 0.32 K/UL
MONOCYTES NFR BLD AUTO: 6.1 %
NEUTROPHILS # BLD AUTO: 3.46 K/UL
NEUTROPHILS NFR BLD AUTO: 66.1 %
PHOSPHATE SERPL-MCNC: 2.9 MG/DL
PLATELET # BLD AUTO: 245 K/UL
POTASSIUM SERPL-SCNC: 4.3 MMOL/L
PROT SERPL-MCNC: 7.2 G/DL
PT BLD: 11.3 SEC
RBC # BLD: 4.01 M/UL
RBC # FLD: 12.6 %
SODIUM SERPL-SCNC: 139 MMOL/L
URATE SERPL-MCNC: 3.8 MG/DL
WBC # FLD AUTO: 5.23 K/UL

## 2022-06-10 PROCEDURE — 99214 OFFICE O/P EST MOD 30 MIN: CPT | Mod: 25

## 2022-06-10 PROCEDURE — G0447 BEHAVIOR COUNSEL OBESITY 15M: CPT | Mod: 59

## 2022-06-22 ENCOUNTER — OUTPATIENT (OUTPATIENT)
Dept: OUTPATIENT SERVICES | Facility: HOSPITAL | Age: 27
LOS: 1 days | End: 2022-06-22
Payer: COMMERCIAL

## 2022-06-22 DIAGNOSIS — Z11.52 ENCOUNTER FOR SCREENING FOR COVID-19: ICD-10-CM

## 2022-06-22 DIAGNOSIS — Z98.890 OTHER SPECIFIED POSTPROCEDURAL STATES: Chronic | ICD-10-CM

## 2022-06-22 DIAGNOSIS — Z95.828 PRESENCE OF OTHER VASCULAR IMPLANTS AND GRAFTS: Chronic | ICD-10-CM

## 2022-06-22 LAB — SARS-COV-2 RNA SPEC QL NAA+PROBE: SIGNIFICANT CHANGE UP

## 2022-06-22 PROCEDURE — U0005: CPT

## 2022-06-22 PROCEDURE — C9803: CPT

## 2022-06-22 PROCEDURE — U0003: CPT

## 2022-06-24 ENCOUNTER — RESULT REVIEW (OUTPATIENT)
Age: 27
End: 2022-06-24

## 2022-06-24 ENCOUNTER — OUTPATIENT (OUTPATIENT)
Dept: OUTPATIENT SERVICES | Facility: HOSPITAL | Age: 27
LOS: 1 days | End: 2022-06-24
Payer: COMMERCIAL

## 2022-06-24 ENCOUNTER — TRANSCRIPTION ENCOUNTER (OUTPATIENT)
Age: 27
End: 2022-06-24

## 2022-06-24 VITALS
WEIGHT: 227.96 LBS | HEIGHT: 71 IN | RESPIRATION RATE: 16 BRPM | OXYGEN SATURATION: 99 % | DIASTOLIC BLOOD PRESSURE: 88 MMHG | TEMPERATURE: 98 F | SYSTOLIC BLOOD PRESSURE: 125 MMHG | HEART RATE: 70 BPM

## 2022-06-24 VITALS
OXYGEN SATURATION: 99 % | HEART RATE: 69 BPM | RESPIRATION RATE: 18 BRPM | SYSTOLIC BLOOD PRESSURE: 96 MMHG | DIASTOLIC BLOOD PRESSURE: 60 MMHG

## 2022-06-24 DIAGNOSIS — C81.90 HODGKIN LYMPHOMA, UNSPECIFIED, UNSPECIFIED SITE: ICD-10-CM

## 2022-06-24 DIAGNOSIS — Z95.828 PRESENCE OF OTHER VASCULAR IMPLANTS AND GRAFTS: Chronic | ICD-10-CM

## 2022-06-24 DIAGNOSIS — Z98.890 OTHER SPECIFIED POSTPROCEDURAL STATES: Chronic | ICD-10-CM

## 2022-06-24 PROCEDURE — 36590 REMOVAL TUNNELED CV CATH: CPT

## 2022-06-24 PROCEDURE — 77001 FLUOROGUIDE FOR VEIN DEVICE: CPT

## 2022-06-24 PROCEDURE — 77001 FLUOROGUIDE FOR VEIN DEVICE: CPT | Mod: 26

## 2022-06-24 RX ORDER — TOPIRAMATE 25 MG
0 TABLET ORAL
Qty: 0 | Refills: 0 | DISCHARGE

## 2022-06-24 RX ORDER — ACETAMINOPHEN, PHENYLEPHRINE HCL 325; 5 MG/1; MG/1
2 TABLET ORAL
Qty: 0 | Refills: 0 | DISCHARGE

## 2022-06-24 NOTE — ASU DISCHARGE PLAN (ADULT/PEDIATRIC) - ASU DC SPECIAL INSTRUCTIONSFT
Chest Port Removal    Discharge Instructions  - You have had a chest port removed from your chest.   - You may shower in 72 hours. No soaking or swimming for 2 weeks or until the site is completely healed.  - Keep the area covered and dry for the next 7 days. It may be removed by a chemotherapy nurse as needed for treatment.  - Do not perform any heavy lifting or put tension on the area for the next week or until the site is healed.  - The skin glue (Dermabond) on your skin will act as a scab, allow it to fall off on its own over the next 1-3 weeks.  - You may resume your normal diet.  - You may resume your normal medications however you should wait 48 hours before restarting aspirin, plavix, or blood thinners.  - It is normal to experience some pain over the site for the next few days. You may take apply ice to the area (20 minutes on, 20 minutes off) and take Tylenol for that pain. Do not take more frequently than every 6 hours and do not exceed more than 3000mg of Tylenol in a 24 hour period.    - You were given conscious sedation which may make you drowsy, therefore you need someone to stay with you until the morning following the procedure.  - Do not drive, engage in heavy lifting or strenuous activity, or drink any alcoholic beverages for the next 24 hours.   - You may resume normal activity in 24 hours.    Notify your primary physician and/or Interventional Radiology IMMEDIATELY if you experience any of the following       - Fever of 101F or 38C       - Chills or Rigors/ Shakes       - Swelling and/or Redness in the area around the port       - Worsening Pain       - Blood soaked bandages or worsening bleeding       - Lightheadedness and/or dizziness upon standing       - Chest Pain/ Tightness       - Shortness of Breath       - Difficulty walking    If you have a problem that you believe requires IMMEDIATE attention, please go to your NEAREST Emergency Room. If you believe your problem can safely wait until you speak to a physician, please call Interventional Radiology for any concerns.    During Normal Weekday Business Hours- You can contact the Interventional Radiology department during normal business hours via telephone.  During Evenings and Weekends- If you need to contact Interventional Radiology during off hours, do so by calling the hospital and requesting to be connected to the Interventional Radiologist on call.

## 2022-06-24 NOTE — PRE PROCEDURE NOTE - PRE PROCEDURE EVALUATION
Procedure:    Indication:      Clinical History: 26F with a history of Hodgkin's Lymphoma. Now s/p completion of chemotherapy. Now planned for port removal.      Allergies: Bee stings (Hives)    No Known Drug Allergies          Physical Exam: NAD, Right Port in place      Anesthesia: Sedation by Anesthesia

## 2022-06-24 NOTE — ASU DISCHARGE PLAN (ADULT/PEDIATRIC) - CALL YOUR DOCTOR IF YOU HAVE ANY OF THE FOLLOWING:
Bleeding that does not stop/Swelling that gets worse/Pain not relieved by Medications/Fever greater than (need to indicate Fahrenheit or Celsius)/Wound/Surgical Site with redness, or foul smelling discharge or pus/Numbness, tingling, color or temperature change to extremity/Nausea and vomiting that does not stop/Unable to urinate/Excessive diarrhea/Inability to tolerate liquids or foods/Increased irritability or sluggishness
Breathing spontaneous and unlabored. Breath sounds clear and equal bilaterally with regular rhythm.

## 2022-06-24 NOTE — ASU PATIENT PROFILE, ADULT - NSICDXPASTSURGICALHX_GEN_ALL_CORE_FT
PAST SURGICAL HISTORY:  Port-A-Cath in place mediport 12/2019, right    S/P lymph node biopsy left cervical 12/2019    
[facility]

## 2022-06-24 NOTE — PROCEDURE NOTE - PROCEDURE FINDINGS AND DETAILS
Removal of right chest port.   Patient tolerated the procedure well with no complications.   All device parts accounted for after removal.

## 2022-06-24 NOTE — ASU PATIENT PROFILE, ADULT - FALL HARM RISK - UNIVERSAL INTERVENTIONS
Bed in lowest position, wheels locked, appropriate side rails in place/Call bell, personal items and telephone in reach/Instruct patient to call for assistance before getting out of bed or chair/Non-slip footwear when patient is out of bed/[place] to call system/Physically safe environment - no spills, clutter or unnecessary equipment/Purposeful Proactive Rounding/Room/bathroom lighting operational, light cord in reach

## 2022-06-24 NOTE — ASU DISCHARGE PLAN (ADULT/PEDIATRIC) - NURSING INSTRUCTIONS
Please feel free to contact us at [phone] if any problems arise. After 6PM, Monday through Friday, on weekends and on holidays, please call [phone] and ask for the radiology resident on call to be paged.

## 2022-06-29 DIAGNOSIS — C85.90 NON-HODGKIN LYMPHOMA, UNSPECIFIED, UNSPECIFIED SITE: ICD-10-CM

## 2022-06-29 DIAGNOSIS — Z45.2 ENCOUNTER FOR ADJUSTMENT AND MANAGEMENT OF VASCULAR ACCESS DEVICE: ICD-10-CM

## 2022-07-07 NOTE — PAST MEDICAL HISTORY
none/nausea/vomiting [Menstruating] : menstruating [Normal Amount/Duration] : it was of a normal amount and duration [Regular Cycle Intervals] : have been regular

## 2022-07-14 ENCOUNTER — APPOINTMENT (OUTPATIENT)
Dept: HEMATOLOGY ONCOLOGY | Facility: CLINIC | Age: 27
End: 2022-07-14

## 2022-07-14 NOTE — HISTORY OF PRESENT ILLNESS
[de-identified] : 25 y/o female patient presents today:\par - f/u weight management/obesity - patient currently on Phentermine 30mg and Topamax 50mg daily, denies any overt side effects, has been decreasing portions, decreased appetite, increased physical activity, wedding was end of May was very happy with fit of dress \par - Weight loss since last visit: 6 lbs Total weight loss: 35 lbs

## 2022-07-15 ENCOUNTER — APPOINTMENT (OUTPATIENT)
Dept: INTERNAL MEDICINE | Facility: CLINIC | Age: 27
End: 2022-07-15

## 2022-07-15 VITALS — HEIGHT: 71 IN | WEIGHT: 224.4 LBS | BODY MASS INDEX: 31.42 KG/M2

## 2022-07-15 PROCEDURE — 99214 OFFICE O/P EST MOD 30 MIN: CPT | Mod: 95

## 2022-07-15 NOTE — HISTORY OF PRESENT ILLNESS
[Home] : at home, [unfilled] , at the time of the visit. [Medical Office: ([facility])___] : at the medical office located in  [Verbal consent obtained from patient] : the patient, [unfilled] [de-identified] : 27 y/o female patient seen today via telehealth appointment:\par - f/u weight management/obesity - patient currently on Phentermine 30mg and Topamax 50mg daily, denies any overt side effects, has been decreasing portions, decreased appetite, increased physical activity, wedding was end of May was very happy with fit of dress \par - Weight loss since last visit: 3 lbs Total weight loss: 38 lbs \par - f/u Covid-19 infection- tested positive on 7/10, had fatigue, myalgia, sore throat, sinus congestion, has been taking Nyquil/Dayquil, Airborne, nasal saline, feeling better symptoms, improving

## 2022-07-15 NOTE — REVIEW OF SYSTEMS
[Hoarseness] : no hoarseness [Nasal Discharge] : nasal discharge [Sore Throat] : sore throat [Postnasal Drip] : postnasal drip [Negative] : Psychiatric

## 2022-07-15 NOTE — COUNSELING
[Potential consequences of obesity discussed] : Potential consequences of obesity discussed [Benefits of weight loss discussed] : Benefits of weight loss discussed [Structured Weight Management Program suggested:] : Structured weight management program suggested [Encouraged to maintain food diary] : Encouraged to maintain food diary [Encouraged to increase physical activity] : Encouraged to increase physical activity [Encouraged to use exercise tracking device] : Encouraged to use exercise tracking device [Weigh Self Weekly] : weigh self weekly [Decrease Portions] : decrease portions [____ min/wk Activity] : [unfilled] min/wk activity [Good understanding] : Patient has a good understanding of disease, goals and obesity follow-up plan

## 2022-07-15 NOTE — PHYSICAL EXAM
[No Edema] : there was no peripheral edema [Normal] : affect was normal and insight and judgment were intact [de-identified] : +nasal congestion, sinus pressure

## 2022-07-26 ENCOUNTER — APPOINTMENT (OUTPATIENT)
Dept: HEMATOLOGY ONCOLOGY | Facility: CLINIC | Age: 27
End: 2022-07-26

## 2022-07-26 VITALS
SYSTOLIC BLOOD PRESSURE: 112 MMHG | HEART RATE: 77 BPM | BODY MASS INDEX: 31.52 KG/M2 | OXYGEN SATURATION: 99 % | WEIGHT: 226 LBS | DIASTOLIC BLOOD PRESSURE: 70 MMHG

## 2022-07-26 PROCEDURE — 85025 COMPLETE CBC W/AUTO DIFF WBC: CPT | Mod: GC

## 2022-07-26 PROCEDURE — 99213 OFFICE O/P EST LOW 20 MIN: CPT | Mod: GC,25

## 2022-07-26 NOTE — END OF VISIT
[FreeTextEntry3] : Patient seen and case discussed with KMARON Adrian. I agree with above and have edited the note where needed.\par

## 2022-07-26 NOTE — HISTORY OF PRESENT ILLNESS
[Disease:__________________________] : Disease: [unfilled] [de-identified] : 26 years old female with h/o stage IIB unfavorable Hodgkin lymphoma presents today for follow-up. Patient transferred her care to us as Dr. Ochoa left [facility]. Back in 11/2019 she noticed neck adenopathy associated with skin rash and pruritus. She was evaluated by an ENT physician who performed an excisional left cervical lymph node biopsy which confirmed the diagnosis of classical Hodgkin lymphoma. She finished 4 cycles of ABVD in 5/2020 followed by 30 Gy radiation to the chest and left neck on 8/1/20. Post-treatment PET-CT on 11/5/20 showed complete response, no evidence of FDG avid disease. Repeat TTE on 1/5/21 showed normal findings and EF of 70%.\par \par  [de-identified] : IIB [de-identified] : 7/26/22\par Since her last follow up she has been feeling well. She had been exercising more and eating healthier to lose weight.\par No fevers/chills. No fatigue. No drenching nights sweats. No enlarged lymph nodes. Appetite is good. Weight stable. No CP/SOB. No easy bruising/bleeding. No GI/ issues. No recent/recurrent infections. No new medications.

## 2022-07-26 NOTE — RESULTS/DATA
[FreeTextEntry1] : CBC with diff reviewed 7/26/22\par wbc- 4.8\par hgb- 13.8\par plt- 269\par alc- 1.26\par anc- 3.23

## 2022-07-26 NOTE — ASSESSMENT
[FreeTextEntry1] : 26 years old female with h/o stage IIB unfavorable Hodgkin lymphoma presents today for follow-up. She received 4 cycles of ABVD in 5/2020 followed by 30Gy radiation to the chest and left neck on 8/1/20. Post-treatment PET-CT on 11/5/20 showed complete response, no evidence of FDG avid disease. Presented today for follow-up.\par \par \par # Hodgkin Lymphoma, in remission\par - Given h/o chest radiation, she will need mammogram eight years after radiation. This will be around 8/2028, at her age of 32. Will also consider annual breast MRI in addition to mammogram once start screening.\par - Also consider early colorectal cancer screening at age 35-40.\par -she has received both COVID19 vaccines; she received her booster in November 2021, COVID19 precautions reviewed. \par - Recommend family planning (pregnancy) when she achieves 2-year remission.\par - Patient has been donating blood before cancer diagnosis and wonders when she can restart donating. Recommend she wait for at least 2 more years.\par - Pt's mediport was removed 6/2022\par -no evidence of disease recurrence on recent PET/CT and CTs \par -plan for scans every 6 months for 2 years since last chemo; most recent 4/2022 - No evidence of recurrent disease\par -RTO in 6 months, sooner PRN\par

## 2022-07-27 LAB
ALBUMIN SERPL ELPH-MCNC: 4.5 G/DL
ALP BLD-CCNC: 70 U/L
ALT SERPL-CCNC: 12 U/L
ANION GAP SERPL CALC-SCNC: 12 MMOL/L
AST SERPL-CCNC: 18 U/L
BILIRUB SERPL-MCNC: 0.3 MG/DL
BUN SERPL-MCNC: 9 MG/DL
CALCIUM SERPL-MCNC: 10 MG/DL
CHLORIDE SERPL-SCNC: 104 MMOL/L
CO2 SERPL-SCNC: 23 MMOL/L
CREAT SERPL-MCNC: 0.74 MG/DL
EGFR: 113 ML/MIN/1.73M2
GLUCOSE SERPL-MCNC: 88 MG/DL
LDH SERPL-CCNC: 152 U/L
MAGNESIUM SERPL-MCNC: 2 MG/DL
PHOSPHATE SERPL-MCNC: 3.4 MG/DL
POTASSIUM SERPL-SCNC: 4.3 MMOL/L
PROT SERPL-MCNC: 7.2 G/DL
SODIUM SERPL-SCNC: 139 MMOL/L
URATE SERPL-MCNC: 3.5 MG/DL

## 2022-08-05 NOTE — PHYSICAL EXAM
[de-identified] : right port insertion area without swelling or redness [FreeTextEntry1] : NEW PT PRESENTS FOR ANNUAL EXAM

## 2022-08-19 ENCOUNTER — APPOINTMENT (OUTPATIENT)
Dept: INTERNAL MEDICINE | Facility: CLINIC | Age: 27
End: 2022-08-19

## 2022-08-19 VITALS
DIASTOLIC BLOOD PRESSURE: 79 MMHG | HEART RATE: 77 BPM | OXYGEN SATURATION: 97 % | BODY MASS INDEX: 31.12 KG/M2 | RESPIRATION RATE: 14 BRPM | TEMPERATURE: 98 F | WEIGHT: 222.31 LBS | HEIGHT: 71 IN | SYSTOLIC BLOOD PRESSURE: 115 MMHG

## 2022-08-19 DIAGNOSIS — Z87.09 PERSONAL HISTORY OF OTHER DISEASES OF THE RESPIRATORY SYSTEM: ICD-10-CM

## 2022-08-19 PROCEDURE — G0447 BEHAVIOR COUNSEL OBESITY 15M: CPT | Mod: 59

## 2022-08-19 PROCEDURE — 99214 OFFICE O/P EST MOD 30 MIN: CPT | Mod: 25

## 2022-08-19 RX ORDER — ERGOCALCIFEROL 1.25 MG/1
1.25 MG CAPSULE ORAL
Qty: 15 | Refills: 1 | Status: DISCONTINUED | COMMUNITY
Start: 2021-11-24 | End: 2022-08-19

## 2022-08-31 NOTE — PHYSICAL EXAM
[No Edema] : there was no peripheral edema [Normal] : affect was normal and insight and judgment were intact [de-identified] : +erythematous, flaking, dry

## 2022-08-31 NOTE — HISTORY OF PRESENT ILLNESS
[de-identified] : 27 y/o female patient seen today in office:\par - f/u weight management/obesity - patient currently on Phentermine 30mg and Topamax 50mg daily, denies any overt side effects, has been decreasing portions, decreased appetite, increased physical activity, feels very motivated and excited with progress \par - Weight loss since last visit: 2 lbs Total weight loss: 44 lbs \par - c/o itching, dry rash, erythematous

## 2022-09-07 ENCOUNTER — RX RENEWAL (OUTPATIENT)
Age: 27
End: 2022-09-07

## 2022-09-12 NOTE — HISTORY OF PRESENT ILLNESS
Reviewed below information with patient:     Notified of Negative Covid test result    Informed that once they get their COVID test:   • Self -quarantine is recommended to minimizes your risk of exposure before your procedure, if you are unable to self-quarantine, please continue to wear a mask, practice social distancing and perform good hand hygiene.  • Immediately report any new symptoms or suspected/known exposures to COVID-19 cases to your surgeon’s office  o fever of 100.4 or more  o new cough, or cough that gets worse  o difficulty breathing  o sore throat  o stomach problems: nausea, vomiting or diarrhea  o loss of taste or smell  o chills and/or repeated shaking with chills  o muscle pain  o headache  • Maintain physical distancing (at least 6 feet) at all times both at home and away from home  • Wash hands frequently and thoroughly with soap and water (lather at least 20 seconds) or disinfect with alcohol-based hand  before eating.  This should also be done by the person who will be bringing you to and from the procedure.    At this time, two visitors are allowed into the building. The visitor(s) may have a drink if it is covered with a lid or cap. Please do not drink while care team members are in the room. The visitor(s) may have a drink if it is covered with a lid or cap. Please do not drink while care team members are in the room.     It will be an expectation for the patient/support person to wear a mask at all times during their stay. If the support person wants to leave during the surgical period, please communicate with nursing staff.    • Patient advised to bring a form of payment in the event that they wish to obtain medications from the hospital outpatient pharmacy following their procedure.      [de-identified] : 25 years old female with h/o stage IIB unfavorable Hodgkin lymphoma presents today for follow-up. Patient transferred her care to us as Dr. Ochoa left [facility]. Back in 11/2019 she noticed neck adenopathy associated with skin rash and pruritus. She was evaluated by an ENT physician who performed an excisional left cervical lymph node biopsy which confirmed the diagnosis of classical Hodgkin lymphoma. She finished 4 cycles of ABVD in 5/2020 followed by 30Gy radiation to the chest and left neck on 8/1/20. Post-treatment PET-CT on 11/5/20 showed complete response, no evidence of FDG avid disease. Repeat TTE on 1/5/21 showed normal findings and EF of 70%.\par \par  [de-identified] : IIB [de-identified] : Since her last follow up she has been feeling well and has no complaints. Tingling and numbness of the lower extremities has resolved.\par No fevers/chills. No fatigue. No drenching nights sweats. No enlarged lymph nodes. Appetite is good. Weight stable. No CP/SOB. No easy bruising/bleeding. No GI/ issues. No recent/recurrent infections. No new medications.

## 2022-09-16 ENCOUNTER — APPOINTMENT (OUTPATIENT)
Dept: INTERNAL MEDICINE | Facility: CLINIC | Age: 27
End: 2022-09-16

## 2022-09-16 VITALS
BODY MASS INDEX: 30.8 KG/M2 | WEIGHT: 220 LBS | DIASTOLIC BLOOD PRESSURE: 79 MMHG | SYSTOLIC BLOOD PRESSURE: 110 MMHG | RESPIRATION RATE: 15 BRPM | HEIGHT: 71 IN | TEMPERATURE: 98 F | HEART RATE: 77 BPM | OXYGEN SATURATION: 98 %

## 2022-09-16 DIAGNOSIS — U07.1 COVID-19: ICD-10-CM

## 2022-09-16 PROCEDURE — 99214 OFFICE O/P EST MOD 30 MIN: CPT | Mod: 25

## 2022-09-16 PROCEDURE — G0447 BEHAVIOR COUNSEL OBESITY 15M: CPT | Mod: 59

## 2022-09-21 ENCOUNTER — TRANSCRIPTION ENCOUNTER (OUTPATIENT)
Age: 27
End: 2022-09-21

## 2022-10-14 ENCOUNTER — APPOINTMENT (OUTPATIENT)
Dept: INTERNAL MEDICINE | Facility: CLINIC | Age: 27
End: 2022-10-14

## 2022-10-14 VITALS
HEIGHT: 71 IN | DIASTOLIC BLOOD PRESSURE: 84 MMHG | WEIGHT: 216.13 LBS | HEART RATE: 88 BPM | SYSTOLIC BLOOD PRESSURE: 122 MMHG | BODY MASS INDEX: 30.26 KG/M2 | TEMPERATURE: 98 F | OXYGEN SATURATION: 7 %

## 2022-10-14 PROCEDURE — 99214 OFFICE O/P EST MOD 30 MIN: CPT | Mod: 25

## 2022-10-14 PROCEDURE — G0447 BEHAVIOR COUNSEL OBESITY 15M: CPT | Mod: 59

## 2022-10-15 NOTE — PHYSICAL EXAM
[No Edema] : there was no peripheral edema [Normal] : affect was normal and insight and judgment were intact [de-identified] : +erythematous, flaking, dry

## 2022-10-15 NOTE — HISTORY OF PRESENT ILLNESS
[de-identified] : 25 y/o female patient seen today in office:\par - f/u weight management/obesity - patient currently on Phentermine 37.5mg and Topamax 50mg daily, denies any overt side effects, has been decreasing portions, decreased appetite, increased physical activity, feels very motivated and excited with progress \par - Weight loss since last visit: 2 lbs Total weight loss: 46 lbs

## 2022-10-15 NOTE — COUNSELING
[Benefits of weight loss discussed] : Benefits of weight loss discussed [Potential consequences of obesity discussed] : Potential consequences of obesity discussed [Structured Weight Management Program suggested:] : Structured weight management program suggested [Encouraged to maintain food diary] : Encouraged to maintain food diary [Encouraged to increase physical activity] : Encouraged to increase physical activity [Encouraged to use exercise tracking device] : Encouraged to use exercise tracking device [Weigh Self Weekly] : weigh self weekly [Decrease Portions] : decrease portions [____ min/wk Activity] : [unfilled] min/wk activity [Good understanding] : Patient has a good understanding of disease, goals and obesity follow-up plan [FreeTextEntry1] : Weight loss medications  [FreeTextEntry4] : 15

## 2022-11-13 NOTE — HISTORY OF PRESENT ILLNESS
[de-identified] : 25 y/o female patient seen today in office:\par - f/u weight management/obesity - patient currently on Phentermine 37.5mg and Topamax 100mg daily, denies any overt side effects, has been decreasing portions, decreased appetite, increased physical activity, feels very motivated and excited with progress \par - Weight loss since last visit: 4 lbs Total weight loss: 50 lbs

## 2022-11-13 NOTE — PHYSICAL EXAM
[No Edema] : there was no peripheral edema [Normal] : affect was normal and insight and judgment were intact [de-identified] : +erythematous, flaking, dry

## 2022-11-29 ENCOUNTER — APPOINTMENT (OUTPATIENT)
Dept: INTERNAL MEDICINE | Facility: CLINIC | Age: 27
End: 2022-11-29

## 2022-12-02 ENCOUNTER — APPOINTMENT (OUTPATIENT)
Dept: INTERNAL MEDICINE | Facility: CLINIC | Age: 27
End: 2022-12-02
Payer: COMMERCIAL

## 2022-12-02 VITALS
DIASTOLIC BLOOD PRESSURE: 81 MMHG | BODY MASS INDEX: 30.66 KG/M2 | OXYGEN SATURATION: 98 % | WEIGHT: 219 LBS | HEIGHT: 71 IN | SYSTOLIC BLOOD PRESSURE: 116 MMHG | HEART RATE: 92 BPM | TEMPERATURE: 97.7 F

## 2022-12-02 DIAGNOSIS — Z87.2 PERSONAL HISTORY OF DISEASES OF THE SKIN AND SUBCUTANEOUS TISSUE: ICD-10-CM

## 2022-12-02 PROCEDURE — 36415 COLL VENOUS BLD VENIPUNCTURE: CPT

## 2022-12-02 PROCEDURE — 99213 OFFICE O/P EST LOW 20 MIN: CPT | Mod: 25

## 2022-12-02 PROCEDURE — G0447 BEHAVIOR COUNSEL OBESITY 15M: CPT | Mod: NC,59

## 2022-12-02 PROCEDURE — 99395 PREV VISIT EST AGE 18-39: CPT | Mod: 25

## 2022-12-02 RX ORDER — HYDROXYZINE HYDROCHLORIDE 25 MG/1
25 TABLET ORAL
Qty: 60 | Refills: 0 | Status: DISCONTINUED | COMMUNITY
Start: 2022-08-19 | End: 2022-12-02

## 2022-12-02 RX ORDER — TRIAMCINOLONE ACETONIDE 5 MG/G
0.5 CREAM TOPICAL
Qty: 60 | Refills: 1 | Status: DISCONTINUED | COMMUNITY
Start: 2022-08-19 | End: 2022-12-02

## 2022-12-05 ENCOUNTER — RX RENEWAL (OUTPATIENT)
Age: 27
End: 2022-12-05

## 2023-01-04 ENCOUNTER — APPOINTMENT (OUTPATIENT)
Dept: INTERNAL MEDICINE | Facility: CLINIC | Age: 28
End: 2023-01-04
Payer: COMMERCIAL

## 2023-01-04 VITALS
HEIGHT: 71 IN | BODY MASS INDEX: 31.02 KG/M2 | SYSTOLIC BLOOD PRESSURE: 126 MMHG | WEIGHT: 221.56 LBS | RESPIRATION RATE: 16 BRPM | DIASTOLIC BLOOD PRESSURE: 88 MMHG | TEMPERATURE: 98 F | HEART RATE: 86 BPM | OXYGEN SATURATION: 99 %

## 2023-01-04 DIAGNOSIS — J01.10 ACUTE FRONTAL SINUSITIS, UNSPECIFIED: ICD-10-CM

## 2023-01-04 LAB
25(OH)D3 SERPL-MCNC: 47.2 NG/ML
ALBUMIN SERPL ELPH-MCNC: 4.6 G/DL
ALP BLD-CCNC: 77 U/L
ALT SERPL-CCNC: 18 U/L
ANION GAP SERPL CALC-SCNC: 12 MMOL/L
APPEARANCE: CLEAR
AST SERPL-CCNC: 16 U/L
BACTERIA: NEGATIVE
BASOPHILS # BLD AUTO: 0.03 K/UL
BASOPHILS NFR BLD AUTO: 0.5 %
BILIRUB SERPL-MCNC: 0.5 MG/DL
BILIRUBIN URINE: NEGATIVE
BLOOD URINE: NEGATIVE
BUN SERPL-MCNC: 13 MG/DL
CALCIUM SERPL-MCNC: 9.3 MG/DL
CHLORIDE SERPL-SCNC: 102 MMOL/L
CHOLEST SERPL-MCNC: 207 MG/DL
CO2 SERPL-SCNC: 24 MMOL/L
COLOR: NORMAL
CREAT SERPL-MCNC: 0.83 MG/DL
EGFR: 98 ML/MIN/1.73M2
EOSINOPHIL # BLD AUTO: 0.05 K/UL
EOSINOPHIL NFR BLD AUTO: 0.8 %
ESTIMATED AVERAGE GLUCOSE: 97 MG/DL
GLUCOSE QUALITATIVE U: NEGATIVE
GLUCOSE SERPL-MCNC: 87 MG/DL
HBA1C MFR BLD HPLC: 5 %
HCT VFR BLD CALC: 42.5 %
HDLC SERPL-MCNC: 61 MG/DL
HGB BLD-MCNC: 14.2 G/DL
HYALINE CASTS: 1 /LPF
IMM GRANULOCYTES NFR BLD AUTO: 0.3 %
KETONES URINE: NEGATIVE
LDLC SERPL CALC-MCNC: 128 MG/DL
LEUKOCYTE ESTERASE URINE: ABNORMAL
LYMPHOCYTES # BLD AUTO: 1.16 K/UL
LYMPHOCYTES NFR BLD AUTO: 18 %
MAN DIFF?: NORMAL
MCHC RBC-ENTMCNC: 33.4 GM/DL
MCHC RBC-ENTMCNC: 33.5 PG
MCV RBC AUTO: 100.2 FL
MICROSCOPIC-UA: NORMAL
MONOCYTES # BLD AUTO: 0.35 K/UL
MONOCYTES NFR BLD AUTO: 5.4 %
NEUTROPHILS # BLD AUTO: 4.84 K/UL
NEUTROPHILS NFR BLD AUTO: 75 %
NITRITE URINE: NEGATIVE
NONHDLC SERPL-MCNC: 145 MG/DL
PH URINE: 6
PLATELET # BLD AUTO: 255 K/UL
POTASSIUM SERPL-SCNC: 4.1 MMOL/L
PROT SERPL-MCNC: 7.2 G/DL
PROTEIN URINE: NEGATIVE
RBC # BLD: 4.24 M/UL
RBC # FLD: 12 %
RED BLOOD CELLS URINE: 1 /HPF
SODIUM SERPL-SCNC: 138 MMOL/L
SPECIFIC GRAVITY URINE: 1.01
SQUAMOUS EPITHELIAL CELLS: 1 /HPF
TRIGL SERPL-MCNC: 87 MG/DL
TSH SERPL-ACNC: 1.88 UIU/ML
UROBILINOGEN URINE: NORMAL
WBC # FLD AUTO: 6.45 K/UL
WHITE BLOOD CELLS URINE: 22 /HPF

## 2023-01-04 PROCEDURE — G0447 BEHAVIOR COUNSEL OBESITY 15M: CPT

## 2023-01-04 PROCEDURE — 99214 OFFICE O/P EST MOD 30 MIN: CPT | Mod: 25

## 2023-01-04 RX ORDER — PHENTERMINE HYDROCHLORIDE 37.5 MG/1
37.5 CAPSULE ORAL
Qty: 30 | Refills: 0 | Status: DISCONTINUED | COMMUNITY
Start: 2021-12-03 | End: 2023-01-04

## 2023-01-04 RX ORDER — CEPHALEXIN 500 MG/1
500 TABLET ORAL TWICE DAILY
Qty: 14 | Refills: 0 | Status: DISCONTINUED | COMMUNITY
Start: 2022-12-02 | End: 2023-01-04

## 2023-01-04 NOTE — HISTORY OF PRESENT ILLNESS
[de-identified] : 28 y/o female patient presents today:\par - f/u weight management/obesity - patient currently on Phentermine 37.5mg and Topamax 100mg daily, denies any overt side effects, has been decreasing portions, decreased appetite, increased physical activity, feels very motivated and excited with progress \par - Weight loss since last visit: +2 lbs Total weight loss: 45 lbs \par

## 2023-01-04 NOTE — HISTORY OF PRESENT ILLNESS
[de-identified] : 26 y/o female patient presents today for annual CPE/fasting labs\par - HCM: last PAP 12/2021\par - f/u weight management/obesity - patient currently on Phentermine 37.5mg and Topamax 100mg daily, denies any overt side effects, has been decreasing portions, decreased appetite, increased physical activity, feels very motivated and excited with progress \par - Weight loss since last visit: +3 lbs Total weight loss: 47 lbs \par - c/o nasal congestion, sinus congestion, clogged sensation in ears, sore throat, non-productive cough x 2-3 weeks, minimal relief with OTC medications \par

## 2023-01-04 NOTE — REVIEW OF SYSTEMS
[Earache] : no earache [Hearing Loss] : no hearing loss [Nosebleed] : no nosebleeds [Hoarseness] : no hoarseness [Nasal Discharge] : nasal discharge [Sore Throat] : sore throat [Postnasal Drip] : postnasal drip [Shortness Of Breath] : no shortness of breath [Wheezing] : no wheezing [Cough] : cough [Dyspnea on Exertion] : no dyspnea on exertion [Negative] : Heme/Lymph

## 2023-01-04 NOTE — PHYSICAL EXAM
[No Acute Distress] : no acute distress [Well Nourished] : well nourished [Well Developed] : well developed [Well-Appearing] : well-appearing [Normal Sclera/Conjunctiva] : normal sclera/conjunctiva [PERRL] : pupils equal round and reactive to light [EOMI] : extraocular movements intact [Normal Outer Ear/Nose] : the outer ears and nose were normal in appearance [Normal TMs] : both tympanic membranes were normal [Normal Nasal Mucosa] : the nasal mucosa was normal [No JVD] : no jugular venous distention [No Lymphadenopathy] : no lymphadenopathy [Supple] : supple [Thyroid Normal, No Nodules] : the thyroid was normal and there were no nodules present [No Respiratory Distress] : no respiratory distress  [No Accessory Muscle Use] : no accessory muscle use [Clear to Auscultation] : lungs were clear to auscultation bilaterally [Normal Rate] : normal rate  [Regular Rhythm] : with a regular rhythm [Normal S1, S2] : normal S1 and S2 [No Murmur] : no murmur heard [No Carotid Bruits] : no carotid bruits [No Abdominal Bruit] : a ~M bruit was not heard ~T in the abdomen [No Varicosities] : no varicosities [Pedal Pulses Present] : the pedal pulses are present [No Edema] : there was no peripheral edema [No Palpable Aorta] : no palpable aorta [No Extremity Clubbing/Cyanosis] : no extremity clubbing/cyanosis [Soft] : abdomen soft [Non Tender] : non-tender [Non-distended] : non-distended [No Masses] : no abdominal mass palpated [No HSM] : no HSM [Normal Bowel Sounds] : normal bowel sounds [Normal Posterior Cervical Nodes] : no posterior cervical lymphadenopathy [Normal Anterior Cervical Nodes] : no anterior cervical lymphadenopathy [No CVA Tenderness] : no CVA  tenderness [No Spinal Tenderness] : no spinal tenderness [No Joint Swelling] : no joint swelling [Grossly Normal Strength/Tone] : grossly normal strength/tone [No Rash] : no rash [Coordination Grossly Intact] : coordination grossly intact [No Focal Deficits] : no focal deficits [Normal Gait] : normal gait [Deep Tendon Reflexes (DTR)] : deep tendon reflexes were 2+ and symmetric [Speech Grossly Normal] : speech grossly normal [Memory Grossly Normal] : memory grossly normal [Normal Affect] : the affect was normal [Alert and Oriented x3] : oriented to person, place, and time [Normal Mood] : the mood was normal [Normal Insight/Judgement] : insight and judgment were intact [de-identified] : +erythematous oropharynx, clear rhinorrhea, +hypertrophied nasal turbinates

## 2023-01-04 NOTE — HEALTH RISK ASSESSMENT
[Never] : Never [Yes] : Yes [No] : In the past 12 months have you used drugs other than those required for medical reasons? No [0] : 2) Feeling down, depressed, or hopeless: Not at all (0) [PHQ-2 Negative - No further assessment needed] : PHQ-2 Negative - No further assessment needed [[id number]] : 0 [Patient reported PAP Smear was normal] : Patient reported PAP Smear was normal [Change in mental status noted] : No change in mental status noted [None] : None [With Family] : lives with family [Employed] : employed [] :  [Sexually Active] : sexually active [High Risk Behavior] : no high risk behavior [Feels Safe at Home] : Feels safe at home [Reports changes in hearing] : Reports no changes in hearing [Reports changes in vision] : Reports no changes in vision [Reports changes in dental health] : Reports no changes in dental health [PapSmearDate] : 12/2021

## 2023-01-10 ENCOUNTER — RX RENEWAL (OUTPATIENT)
Age: 28
End: 2023-01-10

## 2023-01-19 ENCOUNTER — OUTPATIENT (OUTPATIENT)
Dept: OUTPATIENT SERVICES | Facility: HOSPITAL | Age: 28
LOS: 1 days | Discharge: HOME | End: 2023-01-19

## 2023-01-19 DIAGNOSIS — Z98.890 OTHER SPECIFIED POSTPROCEDURAL STATES: Chronic | ICD-10-CM

## 2023-01-19 DIAGNOSIS — C81.70 OTHER HODGKIN LYMPHOMA, UNSPECIFIED SITE: ICD-10-CM

## 2023-01-19 DIAGNOSIS — Z95.828 PRESENCE OF OTHER VASCULAR IMPLANTS AND GRAFTS: Chronic | ICD-10-CM

## 2023-01-19 RX ORDER — TIRZEPATIDE 2.5 MG/.5ML
2.5 INJECTION, SOLUTION SUBCUTANEOUS
Qty: 1 | Refills: 1 | Status: DISCONTINUED | COMMUNITY
Start: 2023-01-04 | End: 2023-01-19

## 2023-01-24 ENCOUNTER — APPOINTMENT (OUTPATIENT)
Dept: HEMATOLOGY ONCOLOGY | Facility: CLINIC | Age: 28
End: 2023-01-24
Payer: COMMERCIAL

## 2023-01-24 ENCOUNTER — RESULT REVIEW (OUTPATIENT)
Age: 28
End: 2023-01-24

## 2023-01-24 VITALS
OXYGEN SATURATION: 99 % | SYSTOLIC BLOOD PRESSURE: 118 MMHG | DIASTOLIC BLOOD PRESSURE: 79 MMHG | HEART RATE: 80 BPM | RESPIRATION RATE: 16 BRPM | WEIGHT: 223.55 LBS | BODY MASS INDEX: 31.18 KG/M2 | TEMPERATURE: 97 F

## 2023-01-24 LAB
BASOPHILS # BLD AUTO: 0.03 K/UL — SIGNIFICANT CHANGE UP (ref 0–0.2)
BASOPHILS NFR BLD AUTO: 0.5 % — SIGNIFICANT CHANGE UP (ref 0–2)
EOSINOPHIL # BLD AUTO: 0.04 K/UL — SIGNIFICANT CHANGE UP (ref 0–0.5)
EOSINOPHIL NFR BLD AUTO: 0.7 % — SIGNIFICANT CHANGE UP (ref 0–6)
HCT VFR BLD CALC: 39.3 % — SIGNIFICANT CHANGE UP (ref 34.5–45)
HGB BLD-MCNC: 13.5 G/DL — SIGNIFICANT CHANGE UP (ref 11.5–15.5)
IMM GRANULOCYTES NFR BLD AUTO: 0.2 % — SIGNIFICANT CHANGE UP (ref 0–0.9)
LYMPHOCYTES # BLD AUTO: 1.64 K/UL — SIGNIFICANT CHANGE UP (ref 1–3.3)
LYMPHOCYTES # BLD AUTO: 28.7 % — SIGNIFICANT CHANGE UP (ref 13–44)
MCHC RBC-ENTMCNC: 33.8 PG — SIGNIFICANT CHANGE UP (ref 27–34)
MCHC RBC-ENTMCNC: 34.4 G/DL — SIGNIFICANT CHANGE UP (ref 32–36)
MCV RBC AUTO: 98.3 FL — SIGNIFICANT CHANGE UP (ref 80–100)
MONOCYTES # BLD AUTO: 0.31 K/UL — SIGNIFICANT CHANGE UP (ref 0–0.9)
MONOCYTES NFR BLD AUTO: 5.4 % — SIGNIFICANT CHANGE UP (ref 2–14)
NEUTROPHILS # BLD AUTO: 3.68 K/UL — SIGNIFICANT CHANGE UP (ref 1.8–7.4)
NEUTROPHILS NFR BLD AUTO: 64.5 % — SIGNIFICANT CHANGE UP (ref 43–77)
NRBC # BLD: 0 /100 WBCS — SIGNIFICANT CHANGE UP (ref 0–0)
PLATELET # BLD AUTO: 237 K/UL — SIGNIFICANT CHANGE UP (ref 150–400)
RBC # BLD: 4 M/UL — SIGNIFICANT CHANGE UP (ref 3.8–5.2)
RBC # FLD: 11.8 % — SIGNIFICANT CHANGE UP (ref 10.3–14.5)
WBC # BLD: 5.71 K/UL — SIGNIFICANT CHANGE UP (ref 3.8–10.5)
WBC # FLD AUTO: 5.71 K/UL — SIGNIFICANT CHANGE UP (ref 3.8–10.5)

## 2023-01-24 PROCEDURE — 99214 OFFICE O/P EST MOD 30 MIN: CPT | Mod: GC

## 2023-01-25 LAB
ALBUMIN SERPL ELPH-MCNC: 4.6 G/DL
ALP BLD-CCNC: 68 U/L
ALT SERPL-CCNC: 15 U/L
ANION GAP SERPL CALC-SCNC: 11 MMOL/L
AST SERPL-CCNC: 19 U/L
BILIRUB SERPL-MCNC: 0.4 MG/DL
BUN SERPL-MCNC: 14 MG/DL
CALCIUM SERPL-MCNC: 9.7 MG/DL
CHLORIDE SERPL-SCNC: 103 MMOL/L
CO2 SERPL-SCNC: 23 MMOL/L
CREAT SERPL-MCNC: 0.81 MG/DL
EGFR: 101 ML/MIN/1.73M2
GLUCOSE SERPL-MCNC: 96 MG/DL
LDH SERPL-CCNC: 160 U/L
MAGNESIUM SERPL-MCNC: 1.9 MG/DL
PHOSPHATE SERPL-MCNC: 3.6 MG/DL
POTASSIUM SERPL-SCNC: 3.9 MMOL/L
PROT SERPL-MCNC: 7.2 G/DL
SODIUM SERPL-SCNC: 136 MMOL/L
URATE SERPL-MCNC: 3.7 MG/DL

## 2023-01-27 NOTE — END OF VISIT
[FreeTextEntry3] : Patient seen and case discussed with Dr Hollis. I agree with above and have edited the note where needed.\par  [Time Spent: ___ minutes] : I have spent [unfilled] minutes of time on the encounter.

## 2023-01-27 NOTE — HISTORY OF PRESENT ILLNESS
[Disease:__________________________] : Disease: [unfilled] [ECOG Performance Status: 0 - Fully active, able to carry on all pre-disease performance without restriction] : Performance Status: 0 - Fully active, able to carry on all pre-disease performance without restriction [de-identified] : 26 years old female with h/o stage IIB unfavorable Hodgkin lymphoma presents today for follow-up. Patient transferred her care to us as Dr. Ochoa left [facility]. Back in 11/2019 she noticed neck adenopathy associated with skin rash and pruritus. She was evaluated by an ENT physician who performed an excisional left cervical lymph node biopsy which confirmed the diagnosis of classical Hodgkin lymphoma. She finished 4 cycles of ABVD in 5/2020 followed by 30 Gy radiation to the chest and left neck on 8/1/20. Post-treatment PET-CT on 11/5/20 showed complete response, no evidence of FDG avid disease. Repeat TTE on 1/5/21 showed normal findings and EF of 70%.\par \par  [de-identified] : IIB [de-identified] : 1/24/2022\par SInce last visit patient has had a sinus infections, which has now resolved with antibiotics. She reports that her appetite has been good. She denied fevers, chills, night sweats, nausea, vomiting, diarrhea constipations. No enlarged lymph nodes. Recently prescribed Ozempic for weight management (has not yet started taking); weight has been relatively stable.

## 2023-02-01 ENCOUNTER — APPOINTMENT (OUTPATIENT)
Dept: OBGYN | Facility: CLINIC | Age: 28
End: 2023-02-01
Payer: COMMERCIAL

## 2023-02-01 VITALS
DIASTOLIC BLOOD PRESSURE: 76 MMHG | WEIGHT: 225 LBS | BODY MASS INDEX: 31.5 KG/M2 | HEIGHT: 71 IN | SYSTOLIC BLOOD PRESSURE: 115 MMHG

## 2023-02-01 PROCEDURE — 99395 PREV VISIT EST AGE 18-39: CPT

## 2023-02-01 RX ORDER — LIDOCAINE AND PRILOCAINE 25; 25 MG/G; MG/G
2.5-2.5 CREAM TOPICAL
Qty: 3 | Refills: 5 | Status: COMPLETED | COMMUNITY
Start: 2020-01-17 | End: 2023-02-01

## 2023-02-01 RX ORDER — NORGESTIMATE AND ETHINYL ESTRADIOL 0.25-0.035
0.25-35 KIT ORAL
Qty: 84 | Refills: 3 | Status: COMPLETED | COMMUNITY
Start: 2019-10-23 | End: 2023-02-01

## 2023-02-01 NOTE — PHYSICAL EXAM
[Appropriately responsive] : appropriately responsive [Alert] : alert [No Acute Distress] : no acute distress [No Lymphadenopathy] : no lymphadenopathy [Regular Rate Rhythm] : regular rate rhythm [No Murmurs] : no murmurs [Clear to Auscultation B/L] : clear to auscultation bilaterally [Soft] : soft [Non-tender] : non-tender [Non-distended] : non-distended [No HSM] : No HSM [No Lesions] : no lesions [No Mass] : no mass [Oriented x3] : oriented x3 [Examination Of The Breasts] : a normal appearance [No Masses] : no breast masses were palpable [Labia Minora] : normal [Labia Majora] : normal [Discharge] : discharge [Normal] : normal [Uterine Adnexae] : normal [FreeTextEntry5] : cervical eversion

## 2023-02-05 LAB — CYTOLOGY CVX/VAG DOC THIN PREP: NORMAL

## 2023-02-17 ENCOUNTER — APPOINTMENT (OUTPATIENT)
Dept: INTERNAL MEDICINE | Facility: CLINIC | Age: 28
End: 2023-02-17
Payer: COMMERCIAL

## 2023-02-17 VITALS
DIASTOLIC BLOOD PRESSURE: 77 MMHG | HEART RATE: 100 BPM | BODY MASS INDEX: 30.38 KG/M2 | HEIGHT: 71 IN | SYSTOLIC BLOOD PRESSURE: 113 MMHG | WEIGHT: 217 LBS | OXYGEN SATURATION: 96 % | TEMPERATURE: 98.7 F

## 2023-02-17 DIAGNOSIS — Z51.81 ENCOUNTER FOR THERAPEUTIC DRUG LVL MONITORING: ICD-10-CM

## 2023-02-17 DIAGNOSIS — Z01.419 ENCOUNTER FOR GYNECOLOGICAL EXAMINATION (GENERAL) (ROUTINE) W/OUT ABNORMAL FINDINGS: ICD-10-CM

## 2023-02-17 DIAGNOSIS — J30.9 ALLERGIC RHINITIS, UNSPECIFIED: ICD-10-CM

## 2023-02-17 PROCEDURE — 99214 OFFICE O/P EST MOD 30 MIN: CPT | Mod: 25

## 2023-02-17 PROCEDURE — G0447 BEHAVIOR COUNSEL OBESITY 15M: CPT | Mod: 59

## 2023-03-15 ENCOUNTER — TRANSCRIPTION ENCOUNTER (OUTPATIENT)
Age: 28
End: 2023-03-15

## 2023-04-07 ENCOUNTER — APPOINTMENT (OUTPATIENT)
Dept: INTERNAL MEDICINE | Facility: CLINIC | Age: 28
End: 2023-04-07
Payer: COMMERCIAL

## 2023-04-07 VITALS
TEMPERATURE: 98.2 F | WEIGHT: 219 LBS | SYSTOLIC BLOOD PRESSURE: 124 MMHG | HEART RATE: 92 BPM | DIASTOLIC BLOOD PRESSURE: 84 MMHG | HEIGHT: 71 IN | OXYGEN SATURATION: 99 % | BODY MASS INDEX: 30.66 KG/M2

## 2023-04-07 PROCEDURE — G0447 BEHAVIOR COUNSEL OBESITY 15M: CPT | Mod: 59

## 2023-04-07 PROCEDURE — 99214 OFFICE O/P EST MOD 30 MIN: CPT | Mod: 25

## 2023-04-07 NOTE — HISTORY OF PRESENT ILLNESS
[de-identified] : 28 y/o female patient presents today:\par - f/u weight management/obesity - patient currently on Ozempic 0.5mg weekly and Topamax 100mg daily, denies any overt side effects, has been decreasing portions, decreased appetite, increased physical activity, feels very motivated, over last few weeks has been very stressed dog needed surgery, aunt with terminal disease, eating habits have not been usual \par - Weight loss since last visit: +2 lbs Total weight loss: 49 lbs \par

## 2023-05-17 ENCOUNTER — TRANSCRIPTION ENCOUNTER (OUTPATIENT)
Age: 28
End: 2023-05-17

## 2023-05-24 ENCOUNTER — APPOINTMENT (OUTPATIENT)
Dept: INTERNAL MEDICINE | Facility: CLINIC | Age: 28
End: 2023-05-24
Payer: COMMERCIAL

## 2023-05-24 VITALS
HEIGHT: 71 IN | HEART RATE: 76 BPM | SYSTOLIC BLOOD PRESSURE: 112 MMHG | OXYGEN SATURATION: 100 % | DIASTOLIC BLOOD PRESSURE: 77 MMHG | TEMPERATURE: 98 F | BODY MASS INDEX: 30.94 KG/M2 | WEIGHT: 221 LBS

## 2023-05-24 PROCEDURE — G0447 BEHAVIOR COUNSEL OBESITY 15M: CPT | Mod: 59

## 2023-05-24 PROCEDURE — 99214 OFFICE O/P EST MOD 30 MIN: CPT | Mod: 25

## 2023-05-30 NOTE — HISTORY OF PRESENT ILLNESS
[de-identified] : 28 y/o female patient presents today:\par - f/u weight management/obesity - patient currently on Ozempic 0.5mg weekly and Topamax 100mg daily, denies any overt side effects, has been decreasing portions, decreased appetite, increased physical activity, feels very motivated, over last few weeks has been very stressed dog needed surgery, aunt with terminal disease, eating habits have not been usual \par - Weight loss since last visit: +2 lbs Total weight loss: 47 lbs \par

## 2023-06-04 ENCOUNTER — RX RENEWAL (OUTPATIENT)
Age: 28
End: 2023-06-04

## 2023-06-22 ENCOUNTER — TRANSCRIPTION ENCOUNTER (OUTPATIENT)
Age: 28
End: 2023-06-22

## 2023-07-03 ENCOUNTER — APPOINTMENT (OUTPATIENT)
Dept: INTERNAL MEDICINE | Facility: CLINIC | Age: 28
End: 2023-07-03
Payer: COMMERCIAL

## 2023-07-03 VITALS
DIASTOLIC BLOOD PRESSURE: 81 MMHG | RESPIRATION RATE: 15 BRPM | SYSTOLIC BLOOD PRESSURE: 115 MMHG | TEMPERATURE: 98.6 F | WEIGHT: 218 LBS | HEIGHT: 71 IN | OXYGEN SATURATION: 96 % | HEART RATE: 83 BPM | BODY MASS INDEX: 30.52 KG/M2

## 2023-07-03 PROCEDURE — G0447 BEHAVIOR COUNSEL OBESITY 15M: CPT | Mod: 59

## 2023-07-03 PROCEDURE — 99214 OFFICE O/P EST MOD 30 MIN: CPT | Mod: 25

## 2023-07-04 NOTE — HISTORY OF PRESENT ILLNESS
[de-identified] : 26 y/o female patient presents today:\par - f/u weight management/obesity - patient currently on Ozempic 2mg weekly and Topamax 100mg daily, denies any overt side effects, has been decreasing portions, decreased appetite, increased physical activity, feels very motivated\par - Weight loss since last visit: 4 lbs Total weight loss: 51 lbs \par

## 2023-07-25 ENCOUNTER — APPOINTMENT (OUTPATIENT)
Dept: HEMATOLOGY ONCOLOGY | Facility: CLINIC | Age: 28
End: 2023-07-25

## 2023-09-01 ENCOUNTER — APPOINTMENT (OUTPATIENT)
Dept: INTERNAL MEDICINE | Facility: CLINIC | Age: 28
End: 2023-09-01
Payer: COMMERCIAL

## 2023-09-01 VITALS — BODY MASS INDEX: 29.54 KG/M2 | WEIGHT: 211 LBS | HEIGHT: 71 IN

## 2023-09-01 DIAGNOSIS — E66.09 OTHER OBESITY DUE TO EXCESS CALORIES: ICD-10-CM

## 2023-09-01 PROCEDURE — 99213 OFFICE O/P EST LOW 20 MIN: CPT | Mod: 25,95

## 2023-09-01 PROCEDURE — G0447 BEHAVIOR COUNSEL OBESITY 15M: CPT | Mod: 59,95

## 2023-09-01 NOTE — HISTORY OF PRESENT ILLNESS
[Home] : at home, [unfilled] , at the time of the visit. [Medical Office: ([facility])___] : at the medical office located in  [Verbal consent obtained from patient] : the patient, [unfilled] [de-identified] : 26 y/o female patient presents today: - f/u weight management/obesity - patient currently on Ozempic 2mg weekly and Topamax 100mg daily, denies any overt side effects, has been decreasing portions, decreased appetite, increased physical activity, feels very motivated - Weight loss since last visit: 6 lbs Total weight loss: 57 lbs

## 2023-09-03 NOTE — ED PROVIDER NOTE - MEDICAL DECISION MAKING DETAILS
rolling walker
Back pain likely 2/2 to chronic arthritis vs disc herniation. No risk factors or findings concerning for epidural abscess, diskitis, vertebral osteo, cord compression, cauda equina, vertebral fracture or deejay malignancy, AAA, or pyelonephritis.  plan for analgesia & dc home w/ ortho f/u to consider further imaging and workup if symptoms persist.

## 2023-09-12 PROBLEM — E66.09 OTHER OBESITY DUE TO EXCESS CALORIES: Noted: 2021-11-12

## 2023-09-13 ENCOUNTER — OUTPATIENT (OUTPATIENT)
Dept: OUTPATIENT SERVICES | Facility: HOSPITAL | Age: 28
LOS: 1 days | Discharge: HOME | End: 2023-09-13

## 2023-09-13 DIAGNOSIS — Z98.890 OTHER SPECIFIED POSTPROCEDURAL STATES: Chronic | ICD-10-CM

## 2023-09-13 DIAGNOSIS — C81.70 OTHER HODGKIN LYMPHOMA, UNSPECIFIED SITE: ICD-10-CM

## 2023-09-13 DIAGNOSIS — Z95.828 PRESENCE OF OTHER VASCULAR IMPLANTS AND GRAFTS: Chronic | ICD-10-CM

## 2023-09-18 ENCOUNTER — NON-APPOINTMENT (OUTPATIENT)
Age: 28
End: 2023-09-18

## 2023-09-19 ENCOUNTER — APPOINTMENT (OUTPATIENT)
Dept: HEMATOLOGY ONCOLOGY | Facility: CLINIC | Age: 28
End: 2023-09-19
Payer: COMMERCIAL

## 2023-09-19 ENCOUNTER — RESULT REVIEW (OUTPATIENT)
Age: 28
End: 2023-09-19

## 2023-09-19 VITALS
WEIGHT: 212.75 LBS | DIASTOLIC BLOOD PRESSURE: 78 MMHG | BODY MASS INDEX: 29.67 KG/M2 | OXYGEN SATURATION: 96 % | RESPIRATION RATE: 16 BRPM | SYSTOLIC BLOOD PRESSURE: 111 MMHG | HEART RATE: 81 BPM | TEMPERATURE: 97.5 F

## 2023-09-19 DIAGNOSIS — Z78.9 OTHER SPECIFIED HEALTH STATUS: ICD-10-CM

## 2023-09-19 LAB
BASOPHILS # BLD AUTO: 0.03 K/UL — SIGNIFICANT CHANGE UP (ref 0–0.2)
BASOPHILS NFR BLD AUTO: 0.6 % — SIGNIFICANT CHANGE UP (ref 0–2)
EOSINOPHIL # BLD AUTO: 0.04 K/UL — SIGNIFICANT CHANGE UP (ref 0–0.5)
EOSINOPHIL NFR BLD AUTO: 0.9 % — SIGNIFICANT CHANGE UP (ref 0–6)
ERYTHROCYTE [SEDIMENTATION RATE] IN BLOOD: 5 MM/HR — SIGNIFICANT CHANGE UP (ref 0–15)
HCT VFR BLD CALC: 40.2 % — SIGNIFICANT CHANGE UP (ref 34.5–45)
HGB BLD-MCNC: 14.2 G/DL — SIGNIFICANT CHANGE UP (ref 11.5–15.5)
IMM GRANULOCYTES NFR BLD AUTO: 1.3 % — HIGH (ref 0–0.9)
LYMPHOCYTES # BLD AUTO: 1.2 K/UL — SIGNIFICANT CHANGE UP (ref 1–3.3)
LYMPHOCYTES # BLD AUTO: 25.9 % — SIGNIFICANT CHANGE UP (ref 13–44)
MCHC RBC-ENTMCNC: 33.8 PG — SIGNIFICANT CHANGE UP (ref 27–34)
MCHC RBC-ENTMCNC: 35.3 G/DL — SIGNIFICANT CHANGE UP (ref 32–36)
MCV RBC AUTO: 95.7 FL — SIGNIFICANT CHANGE UP (ref 80–100)
MONOCYTES # BLD AUTO: 0.31 K/UL — SIGNIFICANT CHANGE UP (ref 0–0.9)
MONOCYTES NFR BLD AUTO: 6.7 % — SIGNIFICANT CHANGE UP (ref 2–14)
NEUTROPHILS # BLD AUTO: 2.99 K/UL — SIGNIFICANT CHANGE UP (ref 1.8–7.4)
NEUTROPHILS NFR BLD AUTO: 64.6 % — SIGNIFICANT CHANGE UP (ref 43–77)
NRBC # BLD: 0 /100 WBCS — SIGNIFICANT CHANGE UP (ref 0–0)
PLATELET # BLD AUTO: 219 K/UL — SIGNIFICANT CHANGE UP (ref 150–400)
RBC # BLD: 4.2 M/UL — SIGNIFICANT CHANGE UP (ref 3.8–5.2)
RBC # FLD: 11.9 % — SIGNIFICANT CHANGE UP (ref 10.3–14.5)
WBC # BLD: 4.63 K/UL — SIGNIFICANT CHANGE UP (ref 3.8–10.5)
WBC # FLD AUTO: 4.63 K/UL — SIGNIFICANT CHANGE UP (ref 3.8–10.5)

## 2023-09-19 PROCEDURE — 99214 OFFICE O/P EST MOD 30 MIN: CPT

## 2023-10-27 ENCOUNTER — APPOINTMENT (OUTPATIENT)
Dept: INTERNAL MEDICINE | Facility: CLINIC | Age: 28
End: 2023-10-27
Payer: COMMERCIAL

## 2023-10-27 VITALS
DIASTOLIC BLOOD PRESSURE: 89 MMHG | HEIGHT: 71 IN | OXYGEN SATURATION: 99 % | WEIGHT: 202 LBS | SYSTOLIC BLOOD PRESSURE: 112 MMHG | HEART RATE: 83 BPM | BODY MASS INDEX: 28.28 KG/M2

## 2023-10-27 PROCEDURE — 99214 OFFICE O/P EST MOD 30 MIN: CPT | Mod: 25

## 2023-10-27 PROCEDURE — G0447 BEHAVIOR COUNSEL OBESITY 15M: CPT | Mod: 59

## 2023-11-15 ENCOUNTER — TRANSCRIPTION ENCOUNTER (OUTPATIENT)
Age: 28
End: 2023-11-15

## 2023-12-09 ENCOUNTER — RX RENEWAL (OUTPATIENT)
Age: 28
End: 2023-12-09

## 2024-01-02 NOTE — HISTORY OF PRESENT ILLNESS
[de-identified] : 26 y/o female patient presents today: - f/u weight management/obesity - patient currently on Ozempic 2mg weekly and Topamax 100mg daily, denies any overt side effects, has been decreasing portions, decreased appetite, increased physical activity, feels very motivated - Weight loss since last visit: 10 lbs Total weight loss: 67 lbs

## 2024-01-15 LAB
ALBUMIN SERPL ELPH-MCNC: 4.3 G/DL
ALP BLD-CCNC: 49 U/L
ALT SERPL-CCNC: 9 U/L
ANION GAP SERPL CALC-SCNC: 11 MMOL/L
AST SERPL-CCNC: 13 U/L
BILIRUB SERPL-MCNC: 0.5 MG/DL
BUN SERPL-MCNC: 9 MG/DL
CALCIUM SERPL-MCNC: 9.3 MG/DL
CHLORIDE SERPL-SCNC: 103 MMOL/L
CO2 SERPL-SCNC: 25 MMOL/L
CREAT SERPL-MCNC: 0.74 MG/DL
EGFR: 112 ML/MIN/1.73M2
GLUCOSE SERPL-MCNC: 103 MG/DL
LDH SERPL-CCNC: 150 U/L
POTASSIUM SERPL-SCNC: 4.2 MMOL/L
PROT SERPL-MCNC: 6.6 G/DL
SODIUM SERPL-SCNC: 138 MMOL/L
TSH SERPL-ACNC: 1.58 UIU/ML

## 2024-01-24 ENCOUNTER — APPOINTMENT (OUTPATIENT)
Dept: INTERNAL MEDICINE | Facility: CLINIC | Age: 29
End: 2024-01-24
Payer: COMMERCIAL

## 2024-01-24 VITALS
WEIGHT: 195.9 LBS | SYSTOLIC BLOOD PRESSURE: 115 MMHG | TEMPERATURE: 97 F | HEIGHT: 71 IN | DIASTOLIC BLOOD PRESSURE: 73 MMHG | HEART RATE: 85 BPM | BODY MASS INDEX: 27.43 KG/M2 | OXYGEN SATURATION: 97 %

## 2024-01-24 DIAGNOSIS — Z00.00 ENCOUNTER FOR GENERAL ADULT MEDICAL EXAMINATION W/OUT ABNORMAL FINDINGS: ICD-10-CM

## 2024-01-24 DIAGNOSIS — Z87.898 PERSONAL HISTORY OF OTHER SPECIFIED CONDITIONS: ICD-10-CM

## 2024-01-24 PROCEDURE — 36415 COLL VENOUS BLD VENIPUNCTURE: CPT

## 2024-01-24 PROCEDURE — G0447 BEHAVIOR COUNSEL OBESITY 15M: CPT | Mod: 59

## 2024-01-24 PROCEDURE — 99395 PREV VISIT EST AGE 18-39: CPT | Mod: 25

## 2024-01-24 NOTE — PHYSICAL EXAM
[Well Nourished] : well nourished [No Acute Distress] : no acute distress [Well Developed] : well developed [Well-Appearing] : well-appearing [Normal Sclera/Conjunctiva] : normal sclera/conjunctiva [PERRL] : pupils equal round and reactive to light [EOMI] : extraocular movements intact [Normal Outer Ear/Nose] : the outer ears and nose were normal in appearance [Normal Oropharynx] : the oropharynx was normal [Normal Nasal Mucosa] : the nasal mucosa was normal [Normal TMs] : both tympanic membranes were normal [No JVD] : no jugular venous distention [No Lymphadenopathy] : no lymphadenopathy [Supple] : supple [Thyroid Normal, No Nodules] : the thyroid was normal and there were no nodules present [No Accessory Muscle Use] : no accessory muscle use [No Respiratory Distress] : no respiratory distress  [Clear to Auscultation] : lungs were clear to auscultation bilaterally [Regular Rhythm] : with a regular rhythm [Normal Rate] : normal rate  [Normal S1, S2] : normal S1 and S2 [No Murmur] : no murmur heard [No Carotid Bruits] : no carotid bruits [No Abdominal Bruit] : a ~M bruit was not heard ~T in the abdomen [No Varicosities] : no varicosities [Pedal Pulses Present] : the pedal pulses are present [No Edema] : there was no peripheral edema [No Palpable Aorta] : no palpable aorta [No Extremity Clubbing/Cyanosis] : no extremity clubbing/cyanosis [Soft] : abdomen soft [Non Tender] : non-tender [Non-distended] : non-distended [No Masses] : no abdominal mass palpated [Normal Bowel Sounds] : normal bowel sounds [No HSM] : no HSM [Normal Posterior Cervical Nodes] : no posterior cervical lymphadenopathy [Normal Anterior Cervical Nodes] : no anterior cervical lymphadenopathy [No CVA Tenderness] : no CVA  tenderness [No Joint Swelling] : no joint swelling [No Spinal Tenderness] : no spinal tenderness [Grossly Normal Strength/Tone] : grossly normal strength/tone [No Rash] : no rash [Coordination Grossly Intact] : coordination grossly intact [No Focal Deficits] : no focal deficits [Normal Gait] : normal gait [Deep Tendon Reflexes (DTR)] : deep tendon reflexes were 2+ and symmetric [Speech Grossly Normal] : speech grossly normal [Memory Grossly Normal] : memory grossly normal [Alert and Oriented x3] : oriented to person, place, and time [Normal Affect] : the affect was normal [Normal Mood] : the mood was normal [Normal Insight/Judgement] : insight and judgment were intact

## 2024-01-24 NOTE — HEALTH RISK ASSESSMENT
[No] : In the past 12 months have you used drugs other than those required for medical reasons? No [Yes] : Yes [0] : 1) Little interest or pleasure doing things: Not at all (0) [[id number]] : 0 [PHQ-2 Negative - No further assessment needed] : PHQ-2 Negative - No further assessment needed [Change in mental status noted] : No change in mental status noted [Patient reported PAP Smear was normal] : Patient reported PAP Smear was normal [With Family] : lives with family [None] : None [Employed] : employed [] :  [Sexually Active] : sexually active [High Risk Behavior] : no high risk behavior [Reports changes in hearing] : Reports no changes in hearing [Feels Safe at Home] : Feels safe at home [Reports changes in dental health] : Reports no changes in dental health [Reports changes in vision] : Reports no changes in vision [PapSmearDate] : 2/2023 [Never] : Never

## 2024-01-24 NOTE — COUNSELING
[Potential consequences of obesity discussed] : Potential consequences of obesity discussed [Benefits of weight loss discussed] : Benefits of weight loss discussed [Encouraged to maintain food diary] : Encouraged to maintain food diary [Weigh Self Weekly] : weigh self weekly [Encouraged to increase physical activity] : Encouraged to increase physical activity [Decrease Portions] : decrease portions [____ min/wk Activity] : [unfilled] min/wk activity [Good understanding] : Patient has a good understanding of disease, goals and obesity follow-up plan [FreeTextEntry4] : 15

## 2024-01-24 NOTE — HISTORY OF PRESENT ILLNESS
[de-identified] : 27 y/o female patient presents today for annual CPE/fasting labs - reviewed age appropriate preventive screening exams - reviewed and updated PMH/Medications/Allergies/Surgical hx -  f/u weight management/obesity - patient currently on Ozempic 2mg weekly and Topamax 100mg daily, denies any overt side effects, has been decreasing portions, decreased appetite, increased physical activity, feels very motivated - Weight loss since last visit: 7 lbs Total weight loss: 74 lbs

## 2024-01-26 ENCOUNTER — TRANSCRIPTION ENCOUNTER (OUTPATIENT)
Age: 29
End: 2024-01-26

## 2024-02-06 ENCOUNTER — NON-APPOINTMENT (OUTPATIENT)
Age: 29
End: 2024-02-06

## 2024-02-07 ENCOUNTER — APPOINTMENT (OUTPATIENT)
Dept: OBGYN | Facility: CLINIC | Age: 29
End: 2024-02-07
Payer: COMMERCIAL

## 2024-02-07 VITALS
WEIGHT: 197 LBS | BODY MASS INDEX: 27.58 KG/M2 | DIASTOLIC BLOOD PRESSURE: 77 MMHG | SYSTOLIC BLOOD PRESSURE: 118 MMHG | HEIGHT: 71 IN

## 2024-02-07 PROCEDURE — 99395 PREV VISIT EST AGE 18-39: CPT

## 2024-02-07 NOTE — HISTORY OF PRESENT ILLNESS
[FreeTextEntry1] : The patient is here for a routine gynecological examination with Pap smear.  Her LMP was three weeks ago   She is on the pill without difficulty and would like to continue       She has no recent gynecological or urinary problems   Recent U/A showed wbc's   She has no symptoms of UTI

## 2024-02-07 NOTE — DISCUSSION/SUMMARY
[FreeTextEntry1] : A healthy lifestyle can contribute to good health.  This involves maintaining good health habits and avoiding unhealthy activity (e.g. smoking, drugs, etc.)  Patient is counselled on a balanced diet, with Vitamin D supplement as needed.  Any activity is exercise and very important. Walking is encourage and should be brisk. as evidence shows that brisk walking is healthier for both body and brain.    Climbing stairs instead of elevators is good weight bearing exercise.  Dental care both at home and at the dentist office is important.  Rest at night of at least 6 hours is indicated.  The department of healthy lifestyle is available to help in creating good habits as well as dealing with problems  The patient is doing well with the pill and can continue   She is cautioned about more serious side effects.  She has no evidence of phlebitis or increasing headaches.  She can continue to the for al long as she wants unless there are problems with it.   She knows to take the pill the same time every day.  If there are questions or problems, she is advised to get in touch with me right away.

## 2024-02-08 LAB
APPEARANCE: ABNORMAL
BACTERIA: NEGATIVE /HPF
BILIRUBIN URINE: NEGATIVE
BLOOD URINE: NEGATIVE
CALCIUM OXALATE CRYSTALS: PRESENT
CAST: 0 /LPF
COLOR: YELLOW
EPITHELIAL CELLS: 2 /HPF
GLUCOSE QUALITATIVE U: NEGATIVE MG/DL
HPV HIGH+LOW RISK DNA PNL CVX: NOT DETECTED
KETONES URINE: NEGATIVE MG/DL
LEUKOCYTE ESTERASE URINE: NEGATIVE
MICROSCOPIC-UA: NORMAL
NITRITE URINE: NEGATIVE
PH URINE: 5.5
PROTEIN URINE: NEGATIVE MG/DL
RED BLOOD CELLS URINE: 2 /HPF
REVIEW: NORMAL
SPECIFIC GRAVITY URINE: 1.02
UROBILINOGEN URINE: 0.2 MG/DL
WHITE BLOOD CELLS URINE: 2 /HPF

## 2024-02-12 LAB — CYTOLOGY CVX/VAG DOC THIN PREP: NORMAL

## 2024-02-29 ENCOUNTER — OUTPATIENT (OUTPATIENT)
Dept: OUTPATIENT SERVICES | Facility: HOSPITAL | Age: 29
LOS: 1 days | Discharge: HOME | End: 2024-02-29

## 2024-02-29 DIAGNOSIS — Z95.828 PRESENCE OF OTHER VASCULAR IMPLANTS AND GRAFTS: Chronic | ICD-10-CM

## 2024-02-29 DIAGNOSIS — Z98.890 OTHER SPECIFIED POSTPROCEDURAL STATES: Chronic | ICD-10-CM

## 2024-02-29 DIAGNOSIS — C81.70 OTHER HODGKIN LYMPHOMA, UNSPECIFIED SITE: ICD-10-CM

## 2024-03-06 ENCOUNTER — APPOINTMENT (OUTPATIENT)
Dept: INTERNAL MEDICINE | Facility: CLINIC | Age: 29
End: 2024-03-06
Payer: COMMERCIAL

## 2024-03-06 VITALS
TEMPERATURE: 98 F | OXYGEN SATURATION: 97 % | HEIGHT: 71 IN | BODY MASS INDEX: 27.02 KG/M2 | WEIGHT: 193 LBS | SYSTOLIC BLOOD PRESSURE: 108 MMHG | DIASTOLIC BLOOD PRESSURE: 74 MMHG | HEART RATE: 83 BPM

## 2024-03-06 DIAGNOSIS — Z01.419 ENCOUNTER FOR GYNECOLOGICAL EXAMINATION (GENERAL) (ROUTINE) W/OUT ABNORMAL FINDINGS: ICD-10-CM

## 2024-03-06 DIAGNOSIS — E78.2 MIXED HYPERLIPIDEMIA: ICD-10-CM

## 2024-03-06 DIAGNOSIS — Z86.39 PERSONAL HISTORY OF OTHER ENDOCRINE, NUTRITIONAL AND METABOLIC DISEASE: ICD-10-CM

## 2024-03-06 DIAGNOSIS — E55.9 VITAMIN D DEFICIENCY, UNSPECIFIED: ICD-10-CM

## 2024-03-06 LAB
25(OH)D3 SERPL-MCNC: 39.3 NG/ML
ALBUMIN SERPL ELPH-MCNC: 4.6 G/DL
ALP BLD-CCNC: 56 U/L
ALT SERPL-CCNC: 19 U/L
ANION GAP SERPL CALC-SCNC: 13 MMOL/L
APPEARANCE: CLEAR
AST SERPL-CCNC: 17 U/L
BACTERIA: ABNORMAL /HPF
BASOPHILS # BLD AUTO: 0.03 K/UL
BASOPHILS NFR BLD AUTO: 0.5 %
BILIRUB SERPL-MCNC: 0.5 MG/DL
BILIRUBIN URINE: NEGATIVE
BLOOD URINE: NEGATIVE
BUN SERPL-MCNC: 11 MG/DL
CALCIUM OXALATE CRYSTALS: PRESENT
CALCIUM SERPL-MCNC: 9.6 MG/DL
CAST: 0 /LPF
CHLORIDE SERPL-SCNC: 99 MMOL/L
CHOLEST SERPL-MCNC: 179 MG/DL
CO2 SERPL-SCNC: 26 MMOL/L
COLOR: YELLOW
CREAT SERPL-MCNC: 0.75 MG/DL
EGFR: 110 ML/MIN/1.73M2
EOSINOPHIL # BLD AUTO: 0.04 K/UL
EOSINOPHIL NFR BLD AUTO: 0.7 %
EPITHELIAL CELLS: 8 /HPF
ESTIMATED AVERAGE GLUCOSE: 88 MG/DL
GLUCOSE QUALITATIVE U: NEGATIVE MG/DL
GLUCOSE SERPL-MCNC: 85 MG/DL
HBA1C MFR BLD HPLC: 4.7 %
HCT VFR BLD CALC: 40.9 %
HDLC SERPL-MCNC: 54 MG/DL
HGB BLD-MCNC: 13.6 G/DL
IMM GRANULOCYTES NFR BLD AUTO: 0.2 %
KETONES URINE: NEGATIVE MG/DL
LDLC SERPL CALC-MCNC: 106 MG/DL
LEUKOCYTE ESTERASE URINE: ABNORMAL
LYMPHOCYTES # BLD AUTO: 1.62 K/UL
LYMPHOCYTES NFR BLD AUTO: 28.3 %
MAN DIFF?: NORMAL
MCHC RBC-ENTMCNC: 33.2 PG
MCHC RBC-ENTMCNC: 33.3 GM/DL
MCV RBC AUTO: 99.8 FL
MICROSCOPIC-UA: NORMAL
MONOCYTES # BLD AUTO: 0.34 K/UL
MONOCYTES NFR BLD AUTO: 5.9 %
NEUTROPHILS # BLD AUTO: 3.69 K/UL
NEUTROPHILS NFR BLD AUTO: 64.4 %
NITRITE URINE: NEGATIVE
NONHDLC SERPL-MCNC: 125 MG/DL
PH URINE: 5.5
PLATELET # BLD AUTO: 249 K/UL
POTASSIUM SERPL-SCNC: 4.1 MMOL/L
PROT SERPL-MCNC: 7.1 G/DL
PROTEIN URINE: NEGATIVE MG/DL
RBC # BLD: 4.1 M/UL
RBC # FLD: 12 %
RED BLOOD CELLS URINE: 1 /HPF
REVIEW: NORMAL
SODIUM SERPL-SCNC: 137 MMOL/L
SPECIFIC GRAVITY URINE: 1.03
TRIGL SERPL-MCNC: 105 MG/DL
UROBILINOGEN URINE: 0.2 MG/DL
VIT B12 SERPL-MCNC: 664 PG/ML
WBC # FLD AUTO: 5.73 K/UL
WHITE BLOOD CELLS URINE: 33 /HPF

## 2024-03-06 PROCEDURE — 99214 OFFICE O/P EST MOD 30 MIN: CPT

## 2024-03-06 RX ORDER — AZELASTINE HYDROCHLORIDE 137 UG/1
0.1 SPRAY, METERED NASAL
Qty: 1 | Refills: 3 | Status: ACTIVE | COMMUNITY
Start: 2023-02-17 | End: 1900-01-01

## 2024-03-06 RX ORDER — LOPERAMIDE HYDROCHLORIDE 2 MG/1
2 CAPSULE ORAL
Qty: 80 | Refills: 0 | Status: DISCONTINUED | COMMUNITY
Start: 2023-10-27 | End: 2024-03-06

## 2024-03-06 NOTE — HISTORY OF PRESENT ILLNESS
[de-identified] : 29 y/o female presents to office today: -  f/u weight management/obesity - patient currently on Ozempic 2mg weekly and Topamax 100mg daily, denies any overt side effects, has been decreasing portions, decreased appetite, increased physical activity, feels very motivated - Weight loss since last visit: 4 lbs Total weight loss: 78 lbs - review lab results  - will be moving to [place] in May, will have last visit end of April

## 2024-03-06 NOTE — COUNSELING
[Potential consequences of obesity discussed] : Potential consequences of obesity discussed [Benefits of weight loss discussed] : Benefits of weight loss discussed [Encouraged to maintain food diary] : Encouraged to maintain food diary [Encouraged to increase physical activity] : Encouraged to increase physical activity [Decrease Portions] : decrease portions [Weigh Self Weekly] : weigh self weekly [____ min/wk Activity] : [unfilled] min/wk activity [Good understanding] : Patient has a good understanding of disease, goals and obesity follow-up plan [FreeTextEntry4] : 15

## 2024-03-12 ENCOUNTER — APPOINTMENT (OUTPATIENT)
Dept: HEMATOLOGY ONCOLOGY | Facility: CLINIC | Age: 29
End: 2024-03-12
Payer: COMMERCIAL

## 2024-03-12 ENCOUNTER — RESULT REVIEW (OUTPATIENT)
Age: 29
End: 2024-03-12

## 2024-03-12 VITALS
HEART RATE: 76 BPM | DIASTOLIC BLOOD PRESSURE: 79 MMHG | TEMPERATURE: 97.7 F | OXYGEN SATURATION: 99 % | RESPIRATION RATE: 16 BRPM | BODY MASS INDEX: 26.6 KG/M2 | WEIGHT: 190.7 LBS | SYSTOLIC BLOOD PRESSURE: 111 MMHG

## 2024-03-12 DIAGNOSIS — Z92.3 PERSONAL HISTORY OF IRRADIATION: ICD-10-CM

## 2024-03-12 DIAGNOSIS — Z92.21 PERSONAL HISTORY OF ANTINEOPLASTIC CHEMOTHERAPY: ICD-10-CM

## 2024-03-12 DIAGNOSIS — Z80.7 FAMILY HISTORY OF OTHER MALIGNANT NEOPLASMS OF LYMPHOID, HEMATOPOIETIC AND RELATED TISSUES: ICD-10-CM

## 2024-03-12 DIAGNOSIS — C81.90 HODGKIN LYMPHOMA, UNSPECIFIED, UNSPECIFIED SITE: ICD-10-CM

## 2024-03-12 LAB
BASOPHILS # BLD AUTO: 0.02 K/UL — SIGNIFICANT CHANGE UP (ref 0–0.2)
BASOPHILS NFR BLD AUTO: 0.5 % — SIGNIFICANT CHANGE UP (ref 0–2)
EOSINOPHIL # BLD AUTO: 0.02 K/UL — SIGNIFICANT CHANGE UP (ref 0–0.5)
EOSINOPHIL NFR BLD AUTO: 0.5 % — SIGNIFICANT CHANGE UP (ref 0–6)
ERYTHROCYTE [SEDIMENTATION RATE] IN BLOOD: 6 MM/HR — SIGNIFICANT CHANGE UP (ref 0–15)
HCT VFR BLD CALC: 41.6 % — SIGNIFICANT CHANGE UP (ref 34.5–45)
HGB BLD-MCNC: 14.4 G/DL — SIGNIFICANT CHANGE UP (ref 11.5–15.5)
IMM GRANULOCYTES NFR BLD AUTO: 0.3 % — SIGNIFICANT CHANGE UP (ref 0–0.9)
LYMPHOCYTES # BLD AUTO: 0.99 K/UL — LOW (ref 1–3.3)
LYMPHOCYTES # BLD AUTO: 27.1 % — SIGNIFICANT CHANGE UP (ref 13–44)
MCHC RBC-ENTMCNC: 33.7 PG — SIGNIFICANT CHANGE UP (ref 27–34)
MCHC RBC-ENTMCNC: 34.6 G/DL — SIGNIFICANT CHANGE UP (ref 32–36)
MCV RBC AUTO: 97.4 FL — SIGNIFICANT CHANGE UP (ref 80–100)
MONOCYTES # BLD AUTO: 0.22 K/UL — SIGNIFICANT CHANGE UP (ref 0–0.9)
MONOCYTES NFR BLD AUTO: 6 % — SIGNIFICANT CHANGE UP (ref 2–14)
NEUTROPHILS # BLD AUTO: 2.39 K/UL — SIGNIFICANT CHANGE UP (ref 1.8–7.4)
NEUTROPHILS NFR BLD AUTO: 65.6 % — SIGNIFICANT CHANGE UP (ref 43–77)
NRBC # BLD: 0 /100 WBCS — SIGNIFICANT CHANGE UP (ref 0–0)
PLATELET # BLD AUTO: 226 K/UL — SIGNIFICANT CHANGE UP (ref 150–400)
RBC # BLD: 4.27 M/UL — SIGNIFICANT CHANGE UP (ref 3.8–5.2)
RBC # FLD: 12.2 % — SIGNIFICANT CHANGE UP (ref 10.3–14.5)
WBC # BLD: 3.65 K/UL — LOW (ref 3.8–10.5)
WBC # FLD AUTO: 3.65 K/UL — LOW (ref 3.8–10.5)

## 2024-03-12 PROCEDURE — G2211 COMPLEX E/M VISIT ADD ON: CPT

## 2024-03-12 PROCEDURE — 99214 OFFICE O/P EST MOD 30 MIN: CPT

## 2024-03-15 PROBLEM — Z92.21 HISTORY OF CHEMOTHERAPY: Status: ACTIVE | Noted: 2023-09-19

## 2024-03-15 PROBLEM — Z92.3 HISTORY OF RADIATION THERAPY: Status: ACTIVE | Noted: 2023-09-19

## 2024-03-15 PROBLEM — Z80.7 FAMILY HISTORY OF LYMPHOMA: Status: ACTIVE | Noted: 2019-11-29

## 2024-03-15 PROBLEM — C81.90 HODGKIN LYMPHOMA: Status: ACTIVE | Noted: 2019-12-04

## 2024-03-15 LAB
ALBUMIN SERPL ELPH-MCNC: 4.4 G/DL
ALP BLD-CCNC: 53 U/L
ALT SERPL-CCNC: 11 U/L
ANION GAP SERPL CALC-SCNC: 10 MMOL/L
AST SERPL-CCNC: 11 U/L
BILIRUB SERPL-MCNC: 0.6 MG/DL
BUN SERPL-MCNC: 11 MG/DL
CALCIUM SERPL-MCNC: 9.4 MG/DL
CHLORIDE SERPL-SCNC: 102 MMOL/L
CO2 SERPL-SCNC: 22 MMOL/L
CREAT SERPL-MCNC: 0.91 MG/DL
EGFR: 87 ML/MIN/1.73M2
GLUCOSE SERPL-MCNC: 98 MG/DL
LDH SERPL-CCNC: 133 U/L
POTASSIUM SERPL-SCNC: 4.1 MMOL/L
PROT SERPL-MCNC: 7.2 G/DL
SODIUM SERPL-SCNC: 134 MMOL/L

## 2024-03-15 NOTE — HISTORY OF PRESENT ILLNESS
[Disease:__________________________] : Disease: [unfilled] [de-identified] : 27 years old female with h/o stage IIB unfavorable Hodgkin lymphoma presents today for follow-up.  Hx dates to 11/2019 when she noticed neck adenopathy associated with skin rash and pruritus. She was evaluated by an ENT physician who performed an excisional left cervical lymph node biopsy which confirmed the diagnosis of classical Hodgkin lymphoma. She finished 4 cycles of ABVD in 5/2020 followed by 30Gy radiation to the chest and left neck on 8/1/20. Post-treatment PET-CT on 11/5/20 showed complete response, no evidence of FDG avid disease.  Repeat TTE on 1/5/21 showed normal findings and EF of 70%.   [de-identified] : IIB [de-identified] : Cont. to feel well. No constitutional c/o.     CT C/A/P (-) 4/2022 no dysphagia, [id number], CP having normal menstrual periods no plans for having a baby now

## 2024-03-15 NOTE — ASSESSMENT
[FreeTextEntry1] : 27 year old female with h/o stage IIB unfavorable Hodgkin lymphoma presents today for follow-up. She received 4 cycles of ABVD in 5/2020 followed by 30Gy radiation to the chest and left neck on 8/1/20. Post-treatment PET-CT on 11/5/20 showed complete response, no evidence of FDG avid disease. Subsequent CT scans, last 4/2022, (-).  Given h/o chest radiation, she will to be under close surveillance for development of breast cancer.  May need f/u with mammo and MRI. Will be best to have her followed by breast surgeon. Risks associated with past therapy re-reviewed; incl. need for TBSE 1-2x/yr, breast and chest imaging, f/u of thyroid function and screening for thyroid cancer and carotid artery dz.   CBC results reviewed and d/w pt  WBC 4.63, Hgb 14.2, Plt 219K, ANC 2.99 Check CMP, LDH, ESR, TSH with FT4 reflex RV 4 mos

## 2024-03-15 NOTE — HISTORY OF PRESENT ILLNESS
[Disease:__________________________] : Disease: [unfilled] [de-identified] : 27 years old female with h/o stage IIB unfavorable Hodgkin lymphoma presents today for follow-up.  Hx dates to 11/2019 when she noticed neck adenopathy associated with skin rash and pruritus. She was evaluated by an ENT physician who performed an excisional left cervical lymph node biopsy which confirmed the diagnosis of classical Hodgkin lymphoma. She finished 4 cycles of ABVD in 5/2020 followed by 30Gy radiation to the chest and left neck on 8/1/20. Post-treatment PET-CT on 11/5/20 showed complete response, no evidence of FDG avid disease.  Repeat TTE on 1/5/21 showed normal findings and EF of 70%.   [de-identified] : IIB [de-identified] : Cont. to feel well. No constitutional c/o.     CT C/A/P (-) 4/2022 no dysphagia, [id number], CP having normal menstrual periods no plans for having a baby now

## 2024-04-04 ENCOUNTER — TRANSCRIPTION ENCOUNTER (OUTPATIENT)
Age: 29
End: 2024-04-04

## 2024-05-01 ENCOUNTER — APPOINTMENT (OUTPATIENT)
Dept: INTERNAL MEDICINE | Facility: CLINIC | Age: 29
End: 2024-05-01
Payer: COMMERCIAL

## 2024-05-01 VITALS
HEART RATE: 74 BPM | WEIGHT: 190 LBS | SYSTOLIC BLOOD PRESSURE: 110 MMHG | BODY MASS INDEX: 26.6 KG/M2 | OXYGEN SATURATION: 100 % | DIASTOLIC BLOOD PRESSURE: 72 MMHG | TEMPERATURE: 98 F | HEIGHT: 71 IN

## 2024-05-01 DIAGNOSIS — E66.3 OVERWEIGHT: ICD-10-CM

## 2024-05-01 DIAGNOSIS — Z76.89 PERSONS ENCOUNTERING HEALTH SERVICES IN OTHER SPECIFIED CIRCUMSTANCES: ICD-10-CM

## 2024-05-01 PROCEDURE — 99214 OFFICE O/P EST MOD 30 MIN: CPT

## 2024-05-01 RX ORDER — NORGESTIMATE AND ETHINYL ESTRADIOL 0.25-0.035
0.25-35 KIT ORAL
Refills: 0 | Status: DISCONTINUED | COMMUNITY
Start: 2024-03-12 | End: 2024-05-01

## 2024-05-01 RX ORDER — SEMAGLUTIDE 2.68 MG/ML
8 INJECTION, SOLUTION SUBCUTANEOUS
Qty: 3 | Refills: 1 | Status: ACTIVE | COMMUNITY
Start: 2023-01-19 | End: 1900-01-01

## 2024-05-01 RX ORDER — MONTELUKAST 10 MG/1
10 TABLET, FILM COATED ORAL
Qty: 90 | Refills: 1 | Status: ACTIVE | COMMUNITY
Start: 2023-02-17 | End: 1900-01-01

## 2024-05-01 RX ORDER — ERGOCALCIFEROL 1.25 MG/1
1.25 MG CAPSULE, LIQUID FILLED ORAL
Qty: 15 | Refills: 1 | Status: ACTIVE | COMMUNITY
Start: 2022-05-23 | End: 1900-01-01

## 2024-05-01 RX ORDER — TOPIRAMATE 100 MG/1
100 TABLET, FILM COATED ORAL DAILY
Qty: 90 | Refills: 1 | Status: ACTIVE | COMMUNITY
Start: 2021-12-03 | End: 1900-01-01

## 2024-05-14 ENCOUNTER — TRANSCRIPTION ENCOUNTER (OUTPATIENT)
Age: 29
End: 2024-05-14

## 2024-05-15 ENCOUNTER — TRANSCRIPTION ENCOUNTER (OUTPATIENT)
Age: 29
End: 2024-05-15

## 2024-05-16 ENCOUNTER — TRANSCRIPTION ENCOUNTER (OUTPATIENT)
Age: 29
End: 2024-05-16

## 2024-06-11 ENCOUNTER — TRANSCRIPTION ENCOUNTER (OUTPATIENT)
Age: 29
End: 2024-06-11

## 2024-06-12 ENCOUNTER — TRANSCRIPTION ENCOUNTER (OUTPATIENT)
Age: 29
End: 2024-06-12

## 2024-06-12 PROBLEM — E66.3 OVERWEIGHT: Status: ACTIVE | Noted: 2023-09-12

## 2024-06-12 NOTE — HISTORY OF PRESENT ILLNESS
[de-identified] : 27 y/o female presents to office today: -  f/u weight management/obesity - patient currently on Ozempic 2mg weekly and Topamax 100mg daily, denies any overt side effects, has been decreasing portions, decreased appetite, increased physical activity, feels very motivated - Weight loss since last visit: 3 lbs Total weight loss: 81 lbs - will be moving to [place] end of the month

## 2024-06-17 ENCOUNTER — TRANSCRIPTION ENCOUNTER (OUTPATIENT)
Age: 29
End: 2024-06-17

## 2024-06-17 RX ORDER — NORGESTIMATE AND ETHINYL ESTRADIOL 0.25-0.035
0.25-35 KIT ORAL
Qty: 84 | Refills: 3 | Status: ACTIVE | COMMUNITY
Start: 2023-01-10 | End: 1900-01-01

## 2024-08-15 ENCOUNTER — TRANSCRIPTION ENCOUNTER (OUTPATIENT)
Age: 29
End: 2024-08-15

## 2024-10-16 NOTE — ASU PATIENT PROFILE, ADULT - DATE OF FIRST COVID-19 BOOSTER
Pooja calling in from a missed call to Schedule for botox. Shanta transferred to Naomi for assistance.        12-Nov-2021